# Patient Record
Sex: FEMALE | Race: WHITE | Employment: FULL TIME | URBAN - NONMETROPOLITAN AREA
[De-identification: names, ages, dates, MRNs, and addresses within clinical notes are randomized per-mention and may not be internally consistent; named-entity substitution may affect disease eponyms.]

---

## 2017-03-23 ENCOUNTER — OFFICE VISIT (OUTPATIENT)
Dept: NEUROLOGY | Age: 47
End: 2017-03-23
Payer: COMMERCIAL

## 2017-03-23 VITALS
BODY MASS INDEX: 33.12 KG/M2 | HEIGHT: 64 IN | SYSTOLIC BLOOD PRESSURE: 116 MMHG | DIASTOLIC BLOOD PRESSURE: 80 MMHG | WEIGHT: 194 LBS | RESPIRATION RATE: 16 BRPM

## 2017-03-23 DIAGNOSIS — R51.9 CHRONIC DAILY HEADACHE: ICD-10-CM

## 2017-03-23 DIAGNOSIS — R90.89 ABNORMAL BRAIN MRI: Primary | ICD-10-CM

## 2017-03-23 DIAGNOSIS — G25.0 ESSENTIAL TREMOR: ICD-10-CM

## 2017-03-23 DIAGNOSIS — R79.89 ELEVATED PROLACTIN LEVEL: ICD-10-CM

## 2017-03-23 PROCEDURE — 99204 OFFICE O/P NEW MOD 45 MIN: CPT | Performed by: PHYSICIAN ASSISTANT

## 2017-03-23 RX ORDER — DULOXETIN HYDROCHLORIDE 60 MG/1
60 CAPSULE, DELAYED RELEASE ORAL DAILY
COMMUNITY
Start: 2017-01-30 | End: 2017-07-13

## 2017-03-23 RX ORDER — LEVOTHYROXINE SODIUM 125 MCG
100 TABLET ORAL DAILY
COMMUNITY
Start: 2017-01-27

## 2017-03-24 ENCOUNTER — TELEPHONE (OUTPATIENT)
Dept: NEUROLOGY | Age: 47
End: 2017-03-24

## 2017-03-24 DIAGNOSIS — R90.89 ABNORMAL BRAIN MRI: Primary | ICD-10-CM

## 2017-03-24 DIAGNOSIS — Z85.71 HX OF HODGKIN'S LYMPHOMA: ICD-10-CM

## 2017-03-27 ENCOUNTER — TELEPHONE (OUTPATIENT)
Dept: NEUROLOGY | Age: 47
End: 2017-03-27

## 2017-03-28 ENCOUNTER — TELEPHONE (OUTPATIENT)
Dept: NEUROLOGY | Age: 47
End: 2017-03-28

## 2017-03-29 ENCOUNTER — TELEPHONE (OUTPATIENT)
Dept: NEUROSURGERY | Age: 47
End: 2017-03-29

## 2017-04-13 ENCOUNTER — OFFICE VISIT (OUTPATIENT)
Dept: OTOLARYNGOLOGY | Facility: CLINIC | Age: 47
End: 2017-04-13

## 2017-04-13 ENCOUNTER — PROCEDURE VISIT (OUTPATIENT)
Dept: OTOLARYNGOLOGY | Facility: CLINIC | Age: 47
End: 2017-04-13

## 2017-04-13 VITALS
BODY MASS INDEX: 33.14 KG/M2 | TEMPERATURE: 98.6 F | DIASTOLIC BLOOD PRESSURE: 88 MMHG | SYSTOLIC BLOOD PRESSURE: 140 MMHG | HEART RATE: 80 BPM | HEIGHT: 64 IN | WEIGHT: 194.13 LBS

## 2017-04-13 DIAGNOSIS — H72.91 TYMPANIC MEMBRANE PERFORATION, RIGHT: Primary | ICD-10-CM

## 2017-04-13 DIAGNOSIS — H72.91 TYMPANIC MEMBRANE PERFORATION, RIGHT: ICD-10-CM

## 2017-04-13 DIAGNOSIS — K21.9 LARYNGOPHARYNGEAL REFLUX (LPR): ICD-10-CM

## 2017-04-13 DIAGNOSIS — K11.8 PAROTID NODULE: ICD-10-CM

## 2017-04-13 DIAGNOSIS — H90.2 CONDUCTIVE HEARING LOSS: ICD-10-CM

## 2017-04-13 DIAGNOSIS — Z85.72 HISTORY OF NON-HODGKIN'S LYMPHOMA: ICD-10-CM

## 2017-04-13 DIAGNOSIS — E04.1 THYROID NODULE: ICD-10-CM

## 2017-04-13 DIAGNOSIS — H90.71 MIXED HEARING LOSS OF RIGHT EAR: ICD-10-CM

## 2017-04-13 DIAGNOSIS — M54.2 NECK PAIN: ICD-10-CM

## 2017-04-13 DIAGNOSIS — S03.00XA TMJ (DISLOCATION OF TEMPOROMANDIBULAR JOINT), INITIAL ENCOUNTER: Primary | ICD-10-CM

## 2017-04-13 PROCEDURE — 31575 DIAGNOSTIC LARYNGOSCOPY: CPT | Performed by: NURSE PRACTITIONER

## 2017-04-13 PROCEDURE — 92567 TYMPANOMETRY: CPT | Performed by: AUDIOLOGIST-HEARING AID FITTER

## 2017-04-13 PROCEDURE — 92553 AUDIOMETRY AIR & BONE: CPT | Performed by: AUDIOLOGIST-HEARING AID FITTER

## 2017-04-13 PROCEDURE — 99204 OFFICE O/P NEW MOD 45 MIN: CPT | Performed by: NURSE PRACTITIONER

## 2017-04-13 RX ORDER — LEVOTHYROXINE SODIUM 100 MCG
TABLET ORAL
COMMUNITY
Start: 2017-01-27

## 2017-04-13 RX ORDER — DULOXETIN HYDROCHLORIDE 60 MG/1
CAPSULE, DELAYED RELEASE ORAL
COMMUNITY
Start: 2017-04-03

## 2017-04-13 NOTE — PROGRESS NOTES
YOB: 1970  Location: [facility]  Location Address: [address] [place] [address], [facility], [address] [place] [address]  Location Phone: [phone]    Chief Complaint   Patient presents with   • Thyroid Problem       History of Present Illness  Eleanor Fulton is a 46 y.o. female.  Eleanor Fulton is here for evaluation of ENT complaints. The patient has had problems with throat pain, left jaw pain, thyroid nodules  The symptoms are not localized to a particular location. The patient has had moderate symptoms. The symptoms have been present for the last several months . The symptoms are aggravated by  no identifiable factors . The symptoms are improved by no identifieable factors. She has a history of nonHodgkins lymphoma 22 years ago with XRT tx - responded well with negative followup PET CT.  She follows with Dr. Arguello.  She has had some variable neck pain that she points to cervical region and describes as muscle pain.  However, also reports left ?TMJ pain and has popping and clicking of left TMJ joint.  She has been evaluated by dentist.  She has had multiple imaging studies which are going to be scanned into the computer and has multiple places.  She is going to be evaluated by a neurosurgeon for a possible pituitary adenoma.  She has an MRI brain with mention of small parotid LN - this was noted noted on a  CT NECK.  She had notation of bilateral subcentimeter thyroid nodules with atrophic thyroid.  C/o right left numbness which is intermittent  Reports hoarseness and throat pain.  Take Prevacid PRN    CT Soft Tissue Neck With Contrast (2017 11:08 AM)       MRI Brain With Without Contrast (2017 10:50 AM)  MRI Brain With Without Contrast (2017 10:50 AM)   Addenda   Addendum by Wenceslao Torres MD on 2017 10:27 AM    Corrected report for typographical error(s) dictated by Ward Keith RPA-BENJAMÍNA for EMMIE. Markus Torres MD        COMPARISON:    MRI pituitary without and with contrast  2/17/2016            MRI Brain With Without Contrast (02/17/2017 10:50 AM)   Narrative       PROCEDURE    MRI brain and pituitary without and with contrast, 2/17/2017, 9:15 AM        LOCATION: Outpatient, 3.0        CLINICAL INFORMATION    Personal history of Hodgkin's lymphoma. Headache, dizziness,    giddiness.        COMPARISON    None.        TECHNIQUE    MRI brain and pituitary performed including high-resolution 3 mm    images through the pituitary fossa and sella as well as routine 5 mm    brain images. Performed at 1[facility]. T1, fat suppressed T1, T2,    gradient-echo, FLAIR and diffusion-weighted imaging sequences. For    contrast patient was given 9 cc Gadavist.        FINDINGS    Brain appears symmetric. No mass or edema. No evidence of infarct. No    restricted diffusion appreciated.        Ventricles and extra-axial fluid spaces are normal. Basal cisterns are    unremarkable.        The area of lesser enhancement in the left side of the pituitary is    again noted. This measures between 5 and 6 bony or wide and between 3    and 4 mm superior to inferior. On the precontrast images this appears    to correlate with the neurohypophysis rather than the anterior    pituitary.        There is no downward displacement of the cerebellar tonsils.        Orbits appear unremarkable. No significant sinus abnormality. There    are bilateral small enhancing nodules in the parotid glands. 2 are    noted on the right measuring about 8 to 9 mm there is one on the left    measuring about 9 mm. There are also some mildly prominent    submandibular lymph nodes. This is not as well demonstrated as on    previous examination but are probably unchanged.        Small amount of T2 hyperintense signal in the inferior tip of the    mastoid consistent with some retained fluid. Similar appearance on    previous study.        IMPRESSION    1. Mild asymmetric enhancement of the pituitary unchanged from prior    study. Although this  could represent a small pituitary adenoma it    appears to correlate better with the neurohypophysis which is a little    more prominent on the left than right.    2. No other significant intracranial abnormality. No abnormal    enhancement.    3. There are some bilateral stable intraparotid nodules probably    representing lymph nodes. Another possibility is small stable    Warthin's tumors.    4. There are also some mildly prominent submandibular lymph nodes.    These are incompletely demonstrated but probably stable as well. See    recent soft tissue CT examination the neck.         CT Soft Tissue Neck With Contrast (02/13/2017 11:08 AM)  CT Soft Tissue Neck With Contrast (02/13/2017 11:08 AM)   Impressions   IMPRESSION:    1. No acute findings. No cervical adenopathy by size criteria.    Subcentimeter lymph nodes are unchanged.    2. Unchanged parenchymal scarring in the lung apices.           CT Soft Tissue Neck With Contrast (02/13/2017 11:08 AM)   Narrative   EXAMINATION: CT soft tissue neck with contrast February 13, 2017 at    11:04 AM        LOCATION: Outpatient 3.0        HISTORY: Dizziness and giddiness, Enlarged lymph nodes, unspecified,    Personal history of Hodgkin lymphoma.            COMPARISON: August 10, 2014        Contrast: 100 mL Omnipaque 350 intravenous contrast was administered.        CT exam performed using one or more of the following dose reduction    techniques: Automated exposure control, adjustment of the mA and/or KV    according to patient size, and/or use of iterative reconstruction    techniques.        FINDINGS:    The mucosal spaces of the nasopharynx, oropharynx, and hypopharynx are    normal in appearance. Parapharyngeal spaces clear. There is no    cervical adenopathy by size criteria. Subcentimeter submandibular and    cervical chain lymph nodes are noted bilaterally and are unchanged    from 2014.        There are small intraparotid lymph nodes bilaterally. Parotid, 7 the     liver, and thyroid glands are otherwise normal in appearance.        No cervical mass or fluid collection identified. Visualized sinuses    are clear.        No acute osseous abnormality identified. There is unchanged    parenchymal scarring in both lung apices. Sclerotic lesion in the C7    vertebral body is unchanged.            Past Medical History:   Diagnosis Date   • Depression    • Diabetes    • Fibromyalgia    • History of radiation therapy    • Hodgkin's disease    • Hypothyroidism    • Odynophagia    • Pituitary adenoma    • Tachycardia        Past Surgical History:   Procedure Laterality Date   • ANKLE SURGERY     • APPENDECTOMY     • CARDIAC ABLATION     • CHOLECYSTECTOMY     • COLONOSCOPY     • LYMPH NODE BIOPSY     • MYRINGOTOMY W/ TUBES     • OOPHORECTOMY     • SPLENECTOMY     • TONSILLECTOMY           Current Outpatient Prescriptions:   •  DULoxetine (CYMBALTA) 60 MG capsule, , Disp: , Rfl:   •  SYNTHROID 100 MCG tablet, , Disp: , Rfl:     Compazine [prochlorperazine edisylate]    Family History   Problem Relation Age of Onset   • Cancer Mother    • Diabetes Mother    • Heart failure Mother    • Heart failure Father    • Cancer Father    • Diabetes Father        Social History     Social History   • Marital status:      Spouse name: N/A   • Number of children: N/A   • Years of education: N/A     Occupational History   • Not on file.     Social History Main Topics   • Smoking status: Never Smoker   • Smokeless tobacco: Not on file   • Alcohol use Yes      Comment: occasionally   • Drug use: Defer   • Sexual activity: Not on file     Other Topics Concern   • Not on file     Social History Narrative       Review of Systems   Constitutional: Negative.    HENT:        SEE HPI   Eyes: Negative.    Respiratory: Negative.    Cardiovascular: Negative.    Gastrointestinal: Negative.    Endocrine: Negative.    Genitourinary: Negative.    Musculoskeletal: Negative.    Skin: Negative.     Allergic/Immunologic: Negative.    Neurological: Negative.    Hematological: Negative.    Psychiatric/Behavioral: Negative.        Vitals:    04/13/17 1454   BP: 140/88   Pulse: 80   Temp: 98.6 °F (37 °C)       Objective     Physical Exam  CONSTITUTIONAL: well nourished, alert, oriented, in no acute distress     COMMUNICATION AND VOICE: able to communicate normally, normal voice quality    HEAD: normocephalic, no lesions, atraumatic, no tenderness, no masses     FACE: appearance normal, no lesions, no tenderness, no deformities, facial motion symmetric  Popping and clicking at left TMJ upon opening and exam    SALIVARY GLANDS: parotid glands with no tenderness, no swelling, no masses, submandibular glands with normal size, nontender    EYES: ocular motility normal, eyelids normal, orbits normal, no proptosis, conjunctiva normal , pupils equal, round     EARS:  Hearing: response to conversational voice normal bilaterally   External Ears: auricles without lesions  Otoscopic: 40% marginal TM perforation, right EAC normal, left TM scarring, left EAC normal    NOSE:  External Nose: structure normal, no tenderness on palpation, no nasal discharge, no lesions, no evidence of trauma, nostrils patent   Intranasal Exam: nasal mucosa normal, vestibule within normal limits, inferior turbinate normal, nasal septum midline     ORAL:  Lips: upper and lower lips without lesion   Teeth: dentition within normal limits for age   Gums: gingivae healthy   Oral Mucosa: oral mucosa normal, no mucosal lesions   Floor of Mouth: Warthin’s duct patent, mucosa normal  Tongue: lingual mucosa normal without lesions, normal tongue mobility   Palate: soft and hard palates with normal mucosa and structure  Oropharynx: oropharyngeal mucosa normal    LARYNGEAL: NORMAL - see scope    NECK: neck appearance normal, no mass,  noted without erythema or tenderness, right neck incision well healed - no palpable lymphadenopathy    THYROID: thyroid nodules  not palpable    LYMPH NODES: no lymphadenopathy    CHEST/RESPIRATORY: respiratory effort normal,    CARDIOVASCULAR: extremities without cyanosis or edema      NEUROLOGIC/PSYCHIATRIC: oriented to time, place and person, mood normal, affect appropriate, CN II-XII intact grossly    Assessment/Plan   Eleanor was seen today for thyroid problem.    Diagnoses and all orders for this visit:    TMJ (dislocation of temporomandibular joint), initial encounter    Thyroid nodule  -     US Thyroid; Future    Laryngopharyngeal reflux (LPR)    Neck pain    History of non-Hodgkin's lymphoma    Parotid nodule    Conductive hearing loss - right    Tympanic membrane perforation, right      * Surgery not found *  Orders Placed This Encounter   Procedures   • US Thyroid     Standing Status:   Future     Standing Expiration Date:   4/13/2019     Order Specific Question:   Reason for Exam:     Answer:   thyroid nodules     Return in about 6 months (around 10/13/2017) for dr funk.       Patient Instructions   The patient has a thyroid nodule, which is relatively small, and studies do not suggest a malignancy. I have recommended observation with follow-up with me for repeat ultrasound. I explained the pathology of thyroid nodules including the risks of cancer. The options of surgery were discussed, but we will take an observational course for now.     Parotids appears to have stable nonpathologic LN size - I will obtain these actual films however CT neck reports (small) presumably nonpathologic and these are not palpable - (US scanning at bedside reveals 7 mm on right 9 mm left).      Laryngeal exam normal  Increase prevacid to daily  TMj precautions    If Dr. Arguello suspects pathology otherwise to parotids, consider PET CT - as these LN are small - will obtain imaging CD.      Call for problems or worsening symptoms

## 2017-04-13 NOTE — PATIENT INSTRUCTIONS
The patient has a thyroid nodule, which is relatively small, and studies do not suggest a malignancy. I have recommended observation with follow-up with me for repeat ultrasound. I explained the pathology of thyroid nodules including the risks of cancer. The options of surgery were discussed, but we will take an observational course for now.     Parotids appears to have stable nonpathologic LN size - I will obtain these actual films however CT neck reports (small) presumably nonpathologic and these are not palpable - (US scanning at bedside reveals 7 mm on right 9 mm left).      Laryngeal exam normal  Increase prevacid to daily  TMj precautions    If Dr. Arguello suspects pathology otherwise to parotids, consider PET CT - as these LN are small - will obtain imaging CD.      Call for problems or worsening symptoms

## 2017-04-13 NOTE — PROGRESS NOTES
OPERATIVE NOTE:    PROCEDURE:   Flexible Fiberoptic Laryngoscopy    ANESTHESIA:  None    REASON FOR PROCEDURE:  Procedure was recommend for suspicious clinical behavior unresponsive to medical management, throat pain, history of lymphoma.  Risks, benefits and alternatives were discussed.      DETAILS of OPERATION:  The patient was seated in the exam chair.  A flexible fiberoptic laryngoscopy was performed through the oral cavity.  The scope was introduced into the oral cavity and directed to the level of the glottis, examining the structures of the oropharynx, base of tongue, vallecula, supraglottic larynx, glottic larynx, and hypopharynx.      FINDINGS:  Mucosal surfaces:   The mucosal surfaces demonstrated normal mucosa surfaces without inflammation.    Base of tongue:  The base of tongue was found to have no mass or lesion.    Epiglottis:  The epiglottis was found to have no mass or lesion.    Aryepligottic fold:  The AE folds were found to have no mass or lesion.    False Vocal Fold:  The false cords were found to have no mass or lesion.    True Vocal Cord:  The true vocal cords were found to have no mass or lesion.    Arytenoid:   The arytenoids were found to have no mass or lesions.    Hypopharynx:  The hypopharynx was found to have no mass or lesion.    The patient tolerated procedure well.

## 2017-04-25 ENCOUNTER — OFFICE VISIT (OUTPATIENT)
Dept: NEUROSURGERY | Age: 47
End: 2017-04-25
Payer: COMMERCIAL

## 2017-04-25 VITALS
HEART RATE: 105 BPM | OXYGEN SATURATION: 99 % | WEIGHT: 193 LBS | DIASTOLIC BLOOD PRESSURE: 87 MMHG | SYSTOLIC BLOOD PRESSURE: 136 MMHG | BODY MASS INDEX: 32.15 KG/M2 | HEIGHT: 65 IN

## 2017-04-25 DIAGNOSIS — R51.9 NONINTRACTABLE EPISODIC HEADACHE, UNSPECIFIED HEADACHE TYPE: ICD-10-CM

## 2017-04-25 DIAGNOSIS — E23.7 PITUITARY ABNORMALITY (HCC): ICD-10-CM

## 2017-04-25 DIAGNOSIS — E23.7 PITUITARY ABNORMALITY (HCC): Primary | ICD-10-CM

## 2017-04-25 DIAGNOSIS — R53.83 FATIGUE, UNSPECIFIED TYPE: ICD-10-CM

## 2017-04-25 LAB
ALBUMIN SERPL-MCNC: 3.9 G/DL (ref 3.5–5.2)
ALP BLD-CCNC: 103 U/L (ref 35–104)
ALT SERPL-CCNC: 32 U/L (ref 5–33)
ANION GAP SERPL CALCULATED.3IONS-SCNC: 15 MMOL/L (ref 7–19)
AST SERPL-CCNC: 31 U/L (ref 5–32)
BASOPHILS ABSOLUTE: 0.1 K/UL (ref 0–0.2)
BASOPHILS RELATIVE PERCENT: 0.6 % (ref 0–1)
BILIRUB SERPL-MCNC: 0.3 MG/DL (ref 0.2–1.2)
BUN BLDV-MCNC: 7 MG/DL (ref 6–20)
CALCIUM SERPL-MCNC: 9.1 MG/DL (ref 8.6–10)
CHLORIDE BLD-SCNC: 102 MMOL/L (ref 98–111)
CO2: 23 MMOL/L (ref 22–29)
CREAT SERPL-MCNC: 0.9 MG/DL (ref 0.5–0.9)
EOSINOPHILS ABSOLUTE: 0.7 K/UL (ref 0–0.6)
EOSINOPHILS RELATIVE PERCENT: 5.2 % (ref 0–5)
FOLLICLE STIMULATING HORMONE: 11.1 MIU/ML
GFR NON-AFRICAN AMERICAN: >60
GLOBULIN: 5.2 G/DL
GLUCOSE BLD-MCNC: 90 MG/DL (ref 74–109)
HCT VFR BLD CALC: 40.5 % (ref 37–47)
HEMOGLOBIN: 13.1 G/DL (ref 12–16)
LUTEINIZING HORMONE: 8.5 MIU/ML
LYMPHOCYTES ABSOLUTE: 4 K/UL (ref 1.1–4.5)
LYMPHOCYTES RELATIVE PERCENT: 29.2 % (ref 20–40)
MCH RBC QN AUTO: 29.8 PG (ref 27–31)
MCHC RBC AUTO-ENTMCNC: 32.3 G/DL (ref 33–37)
MCV RBC AUTO: 92.3 FL (ref 81–99)
MONOCYTES ABSOLUTE: 1.8 K/UL (ref 0–0.9)
MONOCYTES RELATIVE PERCENT: 13.1 % (ref 0–10)
NEUTROPHILS ABSOLUTE: 7.1 K/UL (ref 1.5–7.5)
NEUTROPHILS RELATIVE PERCENT: 51.5 % (ref 50–65)
PDW BLD-RTO: 14.9 % (ref 11.5–14.5)
PLATELET # BLD: 559 K/UL (ref 130–400)
PMV BLD AUTO: 10.6 FL (ref 7.4–10.4)
POTASSIUM SERPL-SCNC: 3.8 MMOL/L (ref 3.5–5)
RBC # BLD: 4.39 M/UL (ref 4.2–5.4)
SODIUM BLD-SCNC: 140 MMOL/L (ref 136–145)
TOTAL PROTEIN: 9.1 G/DL (ref 6.6–8.7)
TSH SERPL DL<=0.05 MIU/L-ACNC: 4.33 UIU/ML (ref 0.27–4.2)
WBC # BLD: 13.8 K/UL (ref 4.8–10.8)

## 2017-04-25 PROCEDURE — 99203 OFFICE O/P NEW LOW 30 MIN: CPT | Performed by: NEUROLOGICAL SURGERY

## 2017-04-25 RX ORDER — ASPIRIN 325 MG
325 TABLET ORAL DAILY
COMMUNITY
End: 2017-07-13

## 2017-04-25 ASSESSMENT — ENCOUNTER SYMPTOMS
SORE THROAT: 0
HEARTBURN: 0
STRIDOR: 0
EYES NEGATIVE: 1
VOMITING: 0
CONSTIPATION: 1
ABDOMINAL PAIN: 0
RESPIRATORY NEGATIVE: 1
NAUSEA: 0
DIARRHEA: 0
BLOOD IN STOOL: 0
BACK PAIN: 0

## 2017-04-27 LAB
ADRENOCORTICOTROPIC HORMONE: 21 PG/ML (ref 6–58)
GROWTH HORMONE: 0.51 NG/ML (ref 0.05–8)
IGF-1 (INSULIN-LIKE GROWTH I): 113 NG/ML (ref 118–298)
PROLACTIN: 21.7 NG/ML (ref 2.8–26)

## 2017-05-02 ENCOUNTER — TELEPHONE (OUTPATIENT)
Dept: NEUROLOGY | Age: 47
End: 2017-05-02

## 2017-05-05 ENCOUNTER — HOSPITAL ENCOUNTER (OUTPATIENT)
Dept: GENERAL RADIOLOGY | Age: 47
Discharge: HOME | End: 2017-05-05
Payer: COMMERCIAL

## 2017-05-05 DIAGNOSIS — R00.0 TACHYCARDIA, UNSPECIFIED: ICD-10-CM

## 2017-05-05 PROCEDURE — 71020 XR CHEST STANDARD TWO VW: CPT

## 2017-05-24 ENCOUNTER — TELEPHONE (OUTPATIENT)
Dept: NEUROSURGERY | Age: 47
End: 2017-05-24

## 2017-05-26 ENCOUNTER — HOSPITAL ENCOUNTER (OUTPATIENT)
Dept: CT IMAGING | Age: 47
Discharge: HOME | End: 2017-05-26
Payer: COMMERCIAL

## 2017-05-26 DIAGNOSIS — R53.83 OTHER FATIGUE: ICD-10-CM

## 2017-05-26 DIAGNOSIS — C82.11 FOLLICULAR LYMPHOMA GRADE II OF LYMPH NODES OF HEAD, FACE, AND NECK (HCC): ICD-10-CM

## 2017-05-26 PROCEDURE — 74177 CT ABD & PELVIS W/CONTRAST: CPT

## 2017-05-26 PROCEDURE — 6360000004 HC RX CONTRAST MEDICATION: Performed by: INTERNAL MEDICINE

## 2017-05-26 PROCEDURE — 71260 CT THORAX DX C+: CPT

## 2017-05-26 RX ADMIN — IOVERSOL 75 ML: 741 INJECTION INTRA-ARTERIAL; INTRAVENOUS at 12:00

## 2017-05-31 ENCOUNTER — TELEPHONE (OUTPATIENT)
Dept: NEUROLOGY | Age: 47
End: 2017-05-31

## 2017-06-15 ENCOUNTER — HOSPITAL ENCOUNTER (OUTPATIENT)
Dept: GENERAL RADIOLOGY | Age: 47
Discharge: HOME | End: 2017-06-15
Payer: COMMERCIAL

## 2017-06-15 DIAGNOSIS — M25.549 ARTHRALGIA OF HAND, UNSPECIFIED LATERALITY: ICD-10-CM

## 2017-06-15 PROCEDURE — 73130 X-RAY EXAM OF HAND: CPT

## 2017-06-19 ENCOUNTER — TELEPHONE (OUTPATIENT)
Dept: NEUROLOGY | Age: 47
End: 2017-06-19

## 2017-07-10 ENCOUNTER — TELEPHONE (OUTPATIENT)
Dept: NEUROLOGY | Age: 47
End: 2017-07-10

## 2017-07-10 ENCOUNTER — APPOINTMENT (OUTPATIENT)
Dept: CT IMAGING | Age: 47
End: 2017-07-10
Payer: COMMERCIAL

## 2017-07-10 ENCOUNTER — HOSPITAL ENCOUNTER (EMERGENCY)
Age: 47
Discharge: HOME | End: 2017-07-10
Payer: COMMERCIAL

## 2017-07-10 VITALS
WEIGHT: 195 LBS | DIASTOLIC BLOOD PRESSURE: 80 MMHG | RESPIRATION RATE: 24 BRPM | SYSTOLIC BLOOD PRESSURE: 135 MMHG | HEART RATE: 105 BPM | BODY MASS INDEX: 33.29 KG/M2 | TEMPERATURE: 98.7 F | OXYGEN SATURATION: 100 % | HEIGHT: 64 IN

## 2017-07-10 DIAGNOSIS — H81.10 BENIGN PAROXYSMAL POSITIONAL VERTIGO, UNSPECIFIED LATERALITY: Primary | ICD-10-CM

## 2017-07-10 DIAGNOSIS — R51.9 NONINTRACTABLE HEADACHE, UNSPECIFIED CHRONICITY PATTERN, UNSPECIFIED HEADACHE TYPE: ICD-10-CM

## 2017-07-10 LAB
ALBUMIN SERPL-MCNC: 3.8 G/DL (ref 3.5–5.2)
ALP BLD-CCNC: 94 U/L (ref 35–104)
ALT SERPL-CCNC: 32 U/L (ref 5–33)
ANION GAP SERPL CALCULATED.3IONS-SCNC: 14 MMOL/L (ref 7–19)
AST SERPL-CCNC: 32 U/L (ref 5–32)
BASOPHILS ABSOLUTE: 0.1 K/UL (ref 0–0.2)
BASOPHILS RELATIVE PERCENT: 0.6 % (ref 0–1)
BILIRUB SERPL-MCNC: 0.5 MG/DL (ref 0.2–1.2)
BILIRUBIN URINE: NEGATIVE
BLOOD, URINE: NEGATIVE
BUN BLDV-MCNC: 11 MG/DL (ref 6–20)
CALCIUM SERPL-MCNC: 8.8 MG/DL (ref 8.6–10)
CHLORIDE BLD-SCNC: 101 MMOL/L (ref 98–111)
CLARITY: ABNORMAL
CO2: 22 MMOL/L (ref 22–29)
COLOR: YELLOW
CREAT SERPL-MCNC: 0.9 MG/DL (ref 0.5–0.9)
EOSINOPHILS ABSOLUTE: 0.6 K/UL (ref 0–0.6)
EOSINOPHILS RELATIVE PERCENT: 4.3 % (ref 0–5)
GFR NON-AFRICAN AMERICAN: >60
GLUCOSE BLD-MCNC: 103 MG/DL (ref 74–109)
GLUCOSE URINE: NEGATIVE MG/DL
HCG(URINE) PREGNANCY TEST: NEGATIVE
HCT VFR BLD CALC: 38 % (ref 37–47)
HEMOGLOBIN: 12.6 G/DL (ref 12–16)
KETONES, URINE: NEGATIVE MG/DL
LEUKOCYTE ESTERASE, URINE: NEGATIVE
LYMPHOCYTES ABSOLUTE: 4.2 K/UL (ref 1.1–4.5)
LYMPHOCYTES RELATIVE PERCENT: 29.5 % (ref 20–40)
MCH RBC QN AUTO: 30.3 PG (ref 27–31)
MCHC RBC AUTO-ENTMCNC: 33.2 G/DL (ref 33–37)
MCV RBC AUTO: 91.3 FL (ref 81–99)
MONOCYTES ABSOLUTE: 2 K/UL (ref 0–0.9)
MONOCYTES RELATIVE PERCENT: 14.2 % (ref 0–10)
NEUTROPHILS ABSOLUTE: 7.3 K/UL (ref 1.5–7.5)
NEUTROPHILS RELATIVE PERCENT: 50.9 % (ref 50–65)
NITRITE, URINE: NEGATIVE
PDW BLD-RTO: 15 % (ref 11.5–14.5)
PH UA: 5.5
PLATELET # BLD: 493 K/UL (ref 130–400)
PMV BLD AUTO: 10.5 FL (ref 9.4–12.3)
POTASSIUM SERPL-SCNC: 3.9 MMOL/L (ref 3.5–5)
PROTEIN UA: NEGATIVE MG/DL
RBC # BLD: 4.16 M/UL (ref 4.2–5.4)
SODIUM BLD-SCNC: 137 MMOL/L (ref 136–145)
SPECIFIC GRAVITY UA: 1.01
TOTAL PROTEIN: 9.4 G/DL (ref 6.6–8.7)
UROBILINOGEN, URINE: 0.2 E.U./DL
WBC # BLD: 14.3 K/UL (ref 4.8–10.8)

## 2017-07-10 PROCEDURE — 6370000000 HC RX 637 (ALT 250 FOR IP): Performed by: PHYSICIAN ASSISTANT

## 2017-07-10 PROCEDURE — 96374 THER/PROPH/DIAG INJ IV PUSH: CPT

## 2017-07-10 PROCEDURE — 81025 URINE PREGNANCY TEST: CPT

## 2017-07-10 PROCEDURE — 80053 COMPREHEN METABOLIC PANEL: CPT

## 2017-07-10 PROCEDURE — 6360000002 HC RX W HCPCS: Performed by: PHYSICIAN ASSISTANT

## 2017-07-10 PROCEDURE — 2580000003 HC RX 258: Performed by: PHYSICIAN ASSISTANT

## 2017-07-10 PROCEDURE — 85025 COMPLETE CBC W/AUTO DIFF WBC: CPT

## 2017-07-10 PROCEDURE — 36415 COLL VENOUS BLD VENIPUNCTURE: CPT

## 2017-07-10 PROCEDURE — 99284 EMERGENCY DEPT VISIT MOD MDM: CPT

## 2017-07-10 PROCEDURE — 81003 URINALYSIS AUTO W/O SCOPE: CPT

## 2017-07-10 PROCEDURE — 99283 EMERGENCY DEPT VISIT LOW MDM: CPT | Performed by: PHYSICIAN ASSISTANT

## 2017-07-10 PROCEDURE — 70450 CT HEAD/BRAIN W/O DYE: CPT

## 2017-07-10 PROCEDURE — 93005 ELECTROCARDIOGRAM TRACING: CPT

## 2017-07-10 RX ORDER — 0.9 % SODIUM CHLORIDE 0.9 %
1000 INTRAVENOUS SOLUTION INTRAVENOUS ONCE
Status: COMPLETED | OUTPATIENT
Start: 2017-07-10 | End: 2017-07-10

## 2017-07-10 RX ORDER — MECLIZINE HCL 12.5 MG/1
12.5 TABLET ORAL 3 TIMES DAILY PRN
Qty: 20 TABLET | Refills: 0 | Status: SHIPPED | OUTPATIENT
Start: 2017-07-10 | End: 2017-07-20

## 2017-07-10 RX ORDER — ONDANSETRON 4 MG/1
4 TABLET, ORALLY DISINTEGRATING ORAL EVERY 8 HOURS PRN
Qty: 20 TABLET | Refills: 0 | Status: SHIPPED | OUTPATIENT
Start: 2017-07-10 | End: 2017-09-08 | Stop reason: ALTCHOICE

## 2017-07-10 RX ORDER — ONDANSETRON 2 MG/ML
4 INJECTION INTRAMUSCULAR; INTRAVENOUS ONCE
Status: COMPLETED | OUTPATIENT
Start: 2017-07-10 | End: 2017-07-10

## 2017-07-10 RX ORDER — MECLIZINE HCL 12.5 MG/1
25 TABLET ORAL ONCE
Status: COMPLETED | OUTPATIENT
Start: 2017-07-10 | End: 2017-07-10

## 2017-07-10 RX ADMIN — ONDANSETRON 4 MG: 2 INJECTION INTRAMUSCULAR; INTRAVENOUS at 16:11

## 2017-07-10 RX ADMIN — SODIUM CHLORIDE 1000 ML: 9 INJECTION, SOLUTION INTRAVENOUS at 15:56

## 2017-07-10 RX ADMIN — MECLIZINE 25 MG: 12.5 TABLET ORAL at 16:11

## 2017-07-10 ASSESSMENT — ENCOUNTER SYMPTOMS
APNEA: 0
VOMITING: 0
NAUSEA: 0
SORE THROAT: 0
WHEEZING: 0
EYE PAIN: 0
RHINORRHEA: 0
ABDOMINAL DISTENTION: 0
COUGH: 0
CONSTIPATION: 0
CHEST TIGHTNESS: 0
SINUS PRESSURE: 0
DIARRHEA: 0
ABDOMINAL PAIN: 0
SHORTNESS OF BREATH: 0

## 2017-07-10 ASSESSMENT — PAIN DESCRIPTION - PAIN TYPE: TYPE: ACUTE PAIN

## 2017-07-10 ASSESSMENT — PAIN DESCRIPTION - LOCATION: LOCATION: HEAD

## 2017-07-10 ASSESSMENT — PAIN DESCRIPTION - DESCRIPTORS: DESCRIPTORS: SHARP

## 2017-07-10 ASSESSMENT — PAIN SCALES - GENERAL: PAINLEVEL_OUTOF10: 4

## 2017-07-12 ENCOUNTER — TELEPHONE (OUTPATIENT)
Dept: NEUROSURGERY | Age: 47
End: 2017-07-12

## 2017-07-13 ENCOUNTER — OFFICE VISIT (OUTPATIENT)
Dept: NEUROSURGERY | Age: 47
End: 2017-07-13
Payer: COMMERCIAL

## 2017-07-13 VITALS
SYSTOLIC BLOOD PRESSURE: 136 MMHG | BODY MASS INDEX: 33.29 KG/M2 | HEART RATE: 113 BPM | HEIGHT: 64 IN | WEIGHT: 195 LBS | DIASTOLIC BLOOD PRESSURE: 90 MMHG | OXYGEN SATURATION: 100 %

## 2017-07-13 DIAGNOSIS — R53.83 FATIGUE, UNSPECIFIED TYPE: ICD-10-CM

## 2017-07-13 DIAGNOSIS — Z85.71 HX OF HODGKIN'S LYMPHOMA: ICD-10-CM

## 2017-07-13 DIAGNOSIS — R51.9 NONINTRACTABLE EPISODIC HEADACHE, UNSPECIFIED HEADACHE TYPE: ICD-10-CM

## 2017-07-13 DIAGNOSIS — E23.7 PITUITARY ABNORMALITY (HCC): Primary | ICD-10-CM

## 2017-07-13 DIAGNOSIS — R47.89 WORD FINDING DIFFICULTY: ICD-10-CM

## 2017-07-13 PROCEDURE — 99213 OFFICE O/P EST LOW 20 MIN: CPT | Performed by: NEUROLOGICAL SURGERY

## 2017-07-13 RX ORDER — MELOXICAM 7.5 MG/1
7.5 TABLET ORAL
COMMUNITY
Start: 2017-06-15 | End: 2017-09-08 | Stop reason: ALTCHOICE

## 2017-07-13 RX ORDER — DULOXETIN HYDROCHLORIDE 30 MG/1
30 CAPSULE, DELAYED RELEASE ORAL
COMMUNITY
Start: 2017-06-15 | End: 2017-07-13

## 2017-07-14 ENCOUNTER — OFFICE VISIT (OUTPATIENT)
Dept: NEUROLOGY | Age: 47
End: 2017-07-14
Payer: COMMERCIAL

## 2017-07-14 VITALS
HEART RATE: 121 BPM | WEIGHT: 194 LBS | DIASTOLIC BLOOD PRESSURE: 91 MMHG | SYSTOLIC BLOOD PRESSURE: 149 MMHG | BODY MASS INDEX: 33.12 KG/M2 | OXYGEN SATURATION: 99 % | HEIGHT: 64 IN

## 2017-07-14 DIAGNOSIS — M79.641 HAND PAIN, RIGHT: ICD-10-CM

## 2017-07-14 DIAGNOSIS — R29.898 WEAKNESS OF HAND: ICD-10-CM

## 2017-07-14 DIAGNOSIS — R51.9 CHRONIC DAILY HEADACHE: Primary | ICD-10-CM

## 2017-07-14 DIAGNOSIS — R20.0 NUMBNESS AND TINGLING: ICD-10-CM

## 2017-07-14 DIAGNOSIS — R20.2 NUMBNESS AND TINGLING: ICD-10-CM

## 2017-07-14 PROCEDURE — 99214 OFFICE O/P EST MOD 30 MIN: CPT | Performed by: PHYSICIAN ASSISTANT

## 2017-07-14 RX ORDER — TOPIRAMATE 25 MG/1
TABLET ORAL
Qty: 42 TABLET | Refills: 0 | Status: ON HOLD | OUTPATIENT
Start: 2017-07-14 | End: 2017-10-05

## 2017-07-14 RX ORDER — TOPIRAMATE 100 MG/1
TABLET, FILM COATED ORAL
Qty: 30 TABLET | Refills: 3 | Status: SHIPPED | OUTPATIENT
Start: 2017-07-14 | End: 2017-09-08 | Stop reason: ALTCHOICE

## 2017-07-17 LAB
EKG P AXIS: -11 DEGREES
EKG P-R INTERVAL: 142 MS
EKG Q-T INTERVAL: 340 MS
EKG QRS DURATION: 78 MS
EKG QTC CALCULATION (BAZETT): 413 MS
EKG T AXIS: 19 DEGREES

## 2017-07-28 ENCOUNTER — HOSPITAL ENCOUNTER (OUTPATIENT)
Dept: MRI IMAGING | Age: 47
Discharge: HOME | End: 2017-07-28
Payer: COMMERCIAL

## 2017-07-28 ENCOUNTER — HOSPITAL ENCOUNTER (OUTPATIENT)
Dept: NEUROLOGY | Age: 47
Discharge: HOME | End: 2017-07-28
Payer: COMMERCIAL

## 2017-07-28 DIAGNOSIS — R29.898 WEAKNESS OF HAND: ICD-10-CM

## 2017-07-28 DIAGNOSIS — R20.0 NUMBNESS AND TINGLING: ICD-10-CM

## 2017-07-28 DIAGNOSIS — R20.2 NUMBNESS AND TINGLING: ICD-10-CM

## 2017-07-28 DIAGNOSIS — M79.641 HAND PAIN, RIGHT: ICD-10-CM

## 2017-07-28 PROCEDURE — 72141 MRI NECK SPINE W/O DYE: CPT

## 2017-07-28 PROCEDURE — 95886 MUSC TEST DONE W/N TEST COMP: CPT | Performed by: PSYCHIATRY & NEUROLOGY

## 2017-07-28 PROCEDURE — 95910 NRV CNDJ TEST 7-8 STUDIES: CPT | Performed by: PSYCHIATRY & NEUROLOGY

## 2017-07-28 PROCEDURE — 95886 MUSC TEST DONE W/N TEST COMP: CPT

## 2017-07-28 PROCEDURE — 95910 NRV CNDJ TEST 7-8 STUDIES: CPT

## 2017-08-01 ENCOUNTER — TELEPHONE (OUTPATIENT)
Dept: NEUROLOGY | Age: 47
End: 2017-08-01

## 2017-08-02 ENCOUNTER — TELEPHONE (OUTPATIENT)
Dept: NEUROLOGY | Age: 47
End: 2017-08-02

## 2017-08-02 ENCOUNTER — TELEPHONE (OUTPATIENT)
Dept: NEUROSURGERY | Age: 47
End: 2017-08-02

## 2017-08-03 ENCOUNTER — TRANSCRIBE ORDERS (OUTPATIENT)
Dept: ADMINISTRATIVE | Facility: HOSPITAL | Age: 47
End: 2017-08-03

## 2017-08-03 ENCOUNTER — TELEPHONE (OUTPATIENT)
Dept: NEUROLOGY | Age: 47
End: 2017-08-03

## 2017-08-03 DIAGNOSIS — R70.0 ESR RAISED: Primary | ICD-10-CM

## 2017-08-10 ENCOUNTER — TELEPHONE (OUTPATIENT)
Dept: NEUROLOGY | Age: 47
End: 2017-08-10

## 2017-08-10 ENCOUNTER — HOSPITAL ENCOUNTER (OUTPATIENT)
Dept: CT IMAGING | Facility: HOSPITAL | Age: 47
Discharge: HOME | End: 2017-08-10
Attending: INTERNAL MEDICINE | Admitting: INTERNAL MEDICINE

## 2017-08-10 DIAGNOSIS — R70.0 ESR RAISED: ICD-10-CM

## 2017-08-10 LAB — CREAT BLDA-MCNC: 1 MG/DL (ref 0.6–1.3)

## 2017-08-10 PROCEDURE — 74177 CT ABD & PELVIS W/CONTRAST: CPT

## 2017-08-10 PROCEDURE — 71260 CT THORAX DX C+: CPT

## 2017-08-10 PROCEDURE — 82565 ASSAY OF CREATININE: CPT

## 2017-08-10 PROCEDURE — 0 IOPAMIDOL 61 % SOLUTION: Performed by: INTERNAL MEDICINE

## 2017-08-10 RX ADMIN — IOPAMIDOL 100 ML: 612 INJECTION, SOLUTION INTRAVENOUS at 10:45

## 2017-08-11 DIAGNOSIS — G56.01 CARPAL TUNNEL SYNDROME OF RIGHT WRIST: Primary | ICD-10-CM

## 2017-08-15 ENCOUNTER — TELEPHONE (OUTPATIENT)
Dept: NEUROLOGY | Age: 47
End: 2017-08-15

## 2017-08-17 ENCOUNTER — OFFICE VISIT (OUTPATIENT)
Dept: NEUROLOGY | Age: 47
End: 2017-08-17
Payer: COMMERCIAL

## 2017-08-17 VITALS
OXYGEN SATURATION: 100 % | HEART RATE: 102 BPM | HEIGHT: 65 IN | DIASTOLIC BLOOD PRESSURE: 85 MMHG | WEIGHT: 194 LBS | SYSTOLIC BLOOD PRESSURE: 130 MMHG | BODY MASS INDEX: 32.32 KG/M2

## 2017-08-17 DIAGNOSIS — G25.0 ESSENTIAL TREMOR: ICD-10-CM

## 2017-08-17 DIAGNOSIS — G56.01 CARPAL TUNNEL SYNDROME OF RIGHT WRIST: ICD-10-CM

## 2017-08-17 DIAGNOSIS — M79.7 FIBROMYALGIA: ICD-10-CM

## 2017-08-17 DIAGNOSIS — R51.9 CHRONIC DAILY HEADACHE: Primary | ICD-10-CM

## 2017-08-17 PROCEDURE — 99215 OFFICE O/P EST HI 40 MIN: CPT | Performed by: PSYCHIATRY & NEUROLOGY

## 2017-08-17 RX ORDER — PREGABALIN 100 MG/1
CAPSULE ORAL
Qty: 60 CAPSULE | Refills: 2 | Status: SHIPPED | OUTPATIENT
Start: 2017-08-17 | End: 2020-06-15

## 2017-08-17 RX ORDER — MILNACIPRAN HYDROCHLORIDE 50 MG/1
TABLET, FILM COATED ORAL
Status: ON HOLD | COMMUNITY
Start: 2017-07-30 | End: 2017-10-05

## 2017-08-17 RX ORDER — HYDROCODONE BITARTRATE AND ACETAMINOPHEN 5; 325 MG/1; MG/1
TABLET ORAL
Status: ON HOLD | COMMUNITY
Start: 2017-08-08 | End: 2017-10-05 | Stop reason: HOSPADM

## 2017-08-22 ENCOUNTER — HOSPITAL ENCOUNTER (OUTPATIENT)
Dept: NUCLEAR MEDICINE | Age: 47
End: 2017-08-22
Payer: COMMERCIAL

## 2017-08-22 ENCOUNTER — HOSPITAL ENCOUNTER (OUTPATIENT)
Dept: NUCLEAR MEDICINE | Age: 47
Discharge: HOME | End: 2017-08-22
Payer: COMMERCIAL

## 2017-08-22 DIAGNOSIS — M79.7 FIBROMYALGIA: ICD-10-CM

## 2017-08-22 DIAGNOSIS — R51.9 CHRONIC DAILY HEADACHE: ICD-10-CM

## 2017-08-22 PROCEDURE — 3430000000 HC RX DIAGNOSTIC RADIOPHARMACEUTICAL: Performed by: INTERNAL MEDICINE

## 2017-08-22 PROCEDURE — 78306 BONE IMAGING WHOLE BODY: CPT

## 2017-08-22 PROCEDURE — A9561 TC99M OXIDRONATE: HCPCS | Performed by: INTERNAL MEDICINE

## 2017-08-22 RX ADMIN — TECHNETIUM TC 99M OXIDRONATE 20 MILLICURIE: 3.15 INJECTION, POWDER, LYOPHILIZED, FOR SOLUTION INTRAVENOUS at 12:30

## 2017-08-28 ENCOUNTER — TELEPHONE (OUTPATIENT)
Dept: NEUROLOGY | Age: 47
End: 2017-08-28

## 2017-09-08 ENCOUNTER — OFFICE VISIT (OUTPATIENT)
Dept: NEUROLOGY | Age: 47
End: 2017-09-08
Payer: COMMERCIAL

## 2017-09-08 VITALS
SYSTOLIC BLOOD PRESSURE: 139 MMHG | BODY MASS INDEX: 33.29 KG/M2 | WEIGHT: 195 LBS | HEART RATE: 105 BPM | DIASTOLIC BLOOD PRESSURE: 89 MMHG | HEIGHT: 64 IN

## 2017-09-08 DIAGNOSIS — M79.7 FIBROMYALGIA: ICD-10-CM

## 2017-09-08 DIAGNOSIS — G25.0 ESSENTIAL TREMOR: ICD-10-CM

## 2017-09-08 DIAGNOSIS — G56.01 CARPAL TUNNEL SYNDROME OF RIGHT WRIST: Primary | ICD-10-CM

## 2017-09-08 PROCEDURE — 99214 OFFICE O/P EST MOD 30 MIN: CPT | Performed by: PSYCHIATRY & NEUROLOGY

## 2017-09-08 RX ORDER — DULOXETIN HYDROCHLORIDE 30 MG/1
30 CAPSULE, DELAYED RELEASE ORAL DAILY
COMMUNITY
Start: 2017-08-22 | End: 2021-05-26 | Stop reason: ALTCHOICE

## 2017-09-08 RX ORDER — PREDNISONE 1 MG/1
TABLET ORAL
COMMUNITY
Start: 2017-08-22 | End: 2017-09-08 | Stop reason: ALTCHOICE

## 2017-09-14 ENCOUNTER — TRANSCRIBE ORDERS (OUTPATIENT)
Dept: ADMINISTRATIVE | Facility: HOSPITAL | Age: 47
End: 2017-09-14

## 2017-09-14 DIAGNOSIS — C81.90 HODGKIN LYMPHOMA, UNSPECIFIED HODGKIN LYMPHOMA TYPE, UNSPECIFIED BODY REGION (HCC): Primary | ICD-10-CM

## 2017-09-18 ENCOUNTER — HOSPITAL ENCOUNTER (OUTPATIENT)
Dept: CT IMAGING | Facility: HOSPITAL | Age: 47
Discharge: HOME | End: 2017-09-18
Attending: INTERNAL MEDICINE

## 2017-09-18 ENCOUNTER — HOSPITAL ENCOUNTER (OUTPATIENT)
Dept: CT IMAGING | Facility: HOSPITAL | Age: 47
Discharge: HOME | End: 2017-09-18
Attending: INTERNAL MEDICINE | Admitting: INTERNAL MEDICINE

## 2017-09-18 DIAGNOSIS — C81.90 HODGKIN LYMPHOMA, UNSPECIFIED HODGKIN LYMPHOMA TYPE, UNSPECIFIED BODY REGION (HCC): ICD-10-CM

## 2017-09-18 PROCEDURE — A9552 F18 FDG: HCPCS | Performed by: INTERNAL MEDICINE

## 2017-09-18 PROCEDURE — 78816 PET IMAGE W/CT FULL BODY: CPT

## 2017-09-18 PROCEDURE — 0 FLUDEOXYGLUCOSE F18 SOLUTION: Performed by: INTERNAL MEDICINE

## 2017-09-18 RX ADMIN — FLUDEOXYGLUCOSE F18 1 DOSE: 300 INJECTION INTRAVENOUS at 13:45

## 2017-09-25 ENCOUNTER — ANESTHESIA EVENT (OUTPATIENT)
Dept: OPERATING ROOM | Age: 47
End: 2017-09-25

## 2017-10-03 LAB
C-REACTIVE PROTEIN: 1.06 MG/DL (ref 0–0.5)
SEDIMENTATION RATE, ERYTHROCYTE: 55 MM/HR (ref 0–20)

## 2017-10-05 ENCOUNTER — ANESTHESIA (OUTPATIENT)
Dept: OPERATING ROOM | Age: 47
End: 2017-10-05
Payer: COMMERCIAL

## 2017-10-05 ENCOUNTER — HOSPITAL ENCOUNTER (OUTPATIENT)
Age: 47
Setting detail: OUTPATIENT SURGERY
Discharge: HOME | End: 2017-10-05
Attending: ORTHOPAEDIC SURGERY | Admitting: ORTHOPAEDIC SURGERY

## 2017-10-05 VITALS
DIASTOLIC BLOOD PRESSURE: 68 MMHG | RESPIRATION RATE: 22 BRPM | OXYGEN SATURATION: 98 % | SYSTOLIC BLOOD PRESSURE: 129 MMHG

## 2017-10-05 VITALS
RESPIRATION RATE: 20 BRPM | OXYGEN SATURATION: 96 % | WEIGHT: 200 LBS | SYSTOLIC BLOOD PRESSURE: 133 MMHG | DIASTOLIC BLOOD PRESSURE: 80 MMHG | HEIGHT: 64 IN | TEMPERATURE: 99.1 F | HEART RATE: 101 BPM | BODY MASS INDEX: 34.15 KG/M2

## 2017-10-05 PROBLEM — G56.01 CARPAL TUNNEL SYNDROME OF RIGHT WRIST: Status: ACTIVE | Noted: 2017-10-05

## 2017-10-05 LAB — IGG 4: 91 MG/DL (ref 1–123)

## 2017-10-05 PROCEDURE — G8907 PT DOC NO EVENTS ON DISCHARG: HCPCS

## 2017-10-05 PROCEDURE — 01810 ANES PX NRV MUSC F/ARM WRST: CPT | Performed by: NURSE ANESTHETIST, CERTIFIED REGISTERED

## 2017-10-05 PROCEDURE — 64721 CARPAL TUNNEL SURGERY: CPT

## 2017-10-05 PROCEDURE — G8916 PT W IV AB GIVEN ON TIME: HCPCS

## 2017-10-05 RX ORDER — HYDROCODONE BITARTRATE AND ACETAMINOPHEN 5; 325 MG/1; MG/1
2 TABLET ORAL PRN
Status: COMPLETED | OUTPATIENT
Start: 2017-10-05 | End: 2017-10-05

## 2017-10-05 RX ORDER — ONDANSETRON 2 MG/ML
4 INJECTION INTRAMUSCULAR; INTRAVENOUS
Status: DISCONTINUED | OUTPATIENT
Start: 2017-10-05 | End: 2017-10-05 | Stop reason: HOSPADM

## 2017-10-05 RX ORDER — FENTANYL CITRATE 50 UG/ML
INJECTION, SOLUTION INTRAMUSCULAR; INTRAVENOUS PRN
Status: DISCONTINUED | OUTPATIENT
Start: 2017-10-05 | End: 2017-10-05 | Stop reason: SDUPTHER

## 2017-10-05 RX ORDER — MIDAZOLAM HYDROCHLORIDE 1 MG/ML
INJECTION INTRAMUSCULAR; INTRAVENOUS PRN
Status: DISCONTINUED | OUTPATIENT
Start: 2017-10-05 | End: 2017-10-05 | Stop reason: SDUPTHER

## 2017-10-05 RX ORDER — FENTANYL CITRATE 50 UG/ML
50 INJECTION, SOLUTION INTRAMUSCULAR; INTRAVENOUS EVERY 5 MIN PRN
Status: DISCONTINUED | OUTPATIENT
Start: 2017-10-05 | End: 2017-10-05 | Stop reason: HOSPADM

## 2017-10-05 RX ORDER — MEPERIDINE HYDROCHLORIDE 25 MG/ML
12.5 INJECTION INTRAMUSCULAR; INTRAVENOUS; SUBCUTANEOUS EVERY 5 MIN PRN
Status: DISCONTINUED | OUTPATIENT
Start: 2017-10-05 | End: 2017-10-05 | Stop reason: HOSPADM

## 2017-10-05 RX ORDER — HYDROCODONE BITARTRATE AND ACETAMINOPHEN 7.5; 325 MG/1; MG/1
1 TABLET ORAL EVERY 4 HOURS PRN
Qty: 60 TABLET | Refills: 0 | Status: SHIPPED | OUTPATIENT
Start: 2017-10-05 | End: 2017-10-12

## 2017-10-05 RX ORDER — HYDROCODONE BITARTRATE AND ACETAMINOPHEN 5; 325 MG/1; MG/1
1 TABLET ORAL PRN
Status: COMPLETED | OUTPATIENT
Start: 2017-10-05 | End: 2017-10-05

## 2017-10-05 RX ORDER — OXYCODONE HYDROCHLORIDE AND ACETAMINOPHEN 5; 325 MG/1; MG/1
2 TABLET ORAL PRN
Status: DISCONTINUED | OUTPATIENT
Start: 2017-10-05 | End: 2017-10-05 | Stop reason: HOSPADM

## 2017-10-05 RX ORDER — CEFAZOLIN SODIUM 1 G/3ML
INJECTION, POWDER, FOR SOLUTION INTRAMUSCULAR; INTRAVENOUS PRN
Status: DISCONTINUED | OUTPATIENT
Start: 2017-10-05 | End: 2017-10-05 | Stop reason: SDUPTHER

## 2017-10-05 RX ORDER — OXYCODONE HYDROCHLORIDE AND ACETAMINOPHEN 5; 325 MG/1; MG/1
1 TABLET ORAL PRN
Status: DISCONTINUED | OUTPATIENT
Start: 2017-10-05 | End: 2017-10-05 | Stop reason: HOSPADM

## 2017-10-05 RX ORDER — HYDROMORPHONE HCL 110MG/55ML
0.5 PATIENT CONTROLLED ANALGESIA SYRINGE INTRAVENOUS EVERY 5 MIN PRN
Status: DISCONTINUED | OUTPATIENT
Start: 2017-10-05 | End: 2017-10-05 | Stop reason: HOSPADM

## 2017-10-05 RX ORDER — LABETALOL HYDROCHLORIDE 5 MG/ML
5 INJECTION, SOLUTION INTRAVENOUS EVERY 10 MIN PRN
Status: DISCONTINUED | OUTPATIENT
Start: 2017-10-05 | End: 2017-10-05 | Stop reason: HOSPADM

## 2017-10-05 RX ORDER — HYDRALAZINE HYDROCHLORIDE 20 MG/ML
5 INJECTION INTRAMUSCULAR; INTRAVENOUS EVERY 10 MIN PRN
Status: DISCONTINUED | OUTPATIENT
Start: 2017-10-05 | End: 2017-10-05 | Stop reason: HOSPADM

## 2017-10-05 RX ORDER — BUPIVACAINE HYDROCHLORIDE 2.5 MG/ML
INJECTION, SOLUTION EPIDURAL; INFILTRATION; INTRACAUDAL PRN
Status: DISCONTINUED | OUTPATIENT
Start: 2017-10-05 | End: 2017-10-05 | Stop reason: HOSPADM

## 2017-10-05 RX ORDER — ONDANSETRON 4 MG/1
8 TABLET, ORALLY DISINTEGRATING ORAL ONCE
Status: DISCONTINUED | OUTPATIENT
Start: 2017-10-05 | End: 2017-10-05 | Stop reason: HOSPADM

## 2017-10-05 RX ORDER — KETOROLAC TROMETHAMINE 30 MG/ML
INJECTION, SOLUTION INTRAMUSCULAR; INTRAVENOUS PRN
Status: DISCONTINUED | OUTPATIENT
Start: 2017-10-05 | End: 2017-10-05 | Stop reason: SDUPTHER

## 2017-10-05 RX ORDER — ONDANSETRON 2 MG/ML
INJECTION INTRAMUSCULAR; INTRAVENOUS PRN
Status: DISCONTINUED | OUTPATIENT
Start: 2017-10-05 | End: 2017-10-05 | Stop reason: SDUPTHER

## 2017-10-05 RX ORDER — SODIUM CHLORIDE, SODIUM LACTATE, POTASSIUM CHLORIDE, CALCIUM CHLORIDE 600; 310; 30; 20 MG/100ML; MG/100ML; MG/100ML; MG/100ML
INJECTION, SOLUTION INTRAVENOUS CONTINUOUS
Status: DISCONTINUED | OUTPATIENT
Start: 2017-10-05 | End: 2017-10-05 | Stop reason: HOSPADM

## 2017-10-05 RX ORDER — PROPOFOL 10 MG/ML
INJECTION, EMULSION INTRAVENOUS PRN
Status: DISCONTINUED | OUTPATIENT
Start: 2017-10-05 | End: 2017-10-05 | Stop reason: SDUPTHER

## 2017-10-05 RX ORDER — DIPHENHYDRAMINE HYDROCHLORIDE 50 MG/ML
12.5 INJECTION INTRAMUSCULAR; INTRAVENOUS
Status: DISCONTINUED | OUTPATIENT
Start: 2017-10-05 | End: 2017-10-05 | Stop reason: HOSPADM

## 2017-10-05 RX ORDER — HYDROMORPHONE HCL 110MG/55ML
0.25 PATIENT CONTROLLED ANALGESIA SYRINGE INTRAVENOUS EVERY 5 MIN PRN
Status: DISCONTINUED | OUTPATIENT
Start: 2017-10-05 | End: 2017-10-05 | Stop reason: HOSPADM

## 2017-10-05 RX ADMIN — HYDROCODONE BITARTRATE AND ACETAMINOPHEN 1 TABLET: 5; 325 TABLET ORAL at 10:53

## 2017-10-05 RX ADMIN — KETOROLAC TROMETHAMINE 30 MG: 30 INJECTION, SOLUTION INTRAMUSCULAR; INTRAVENOUS at 10:06

## 2017-10-05 RX ADMIN — SODIUM CHLORIDE, SODIUM LACTATE, POTASSIUM CHLORIDE, CALCIUM CHLORIDE: 600; 310; 30; 20 INJECTION, SOLUTION INTRAVENOUS at 08:42

## 2017-10-05 RX ADMIN — PROPOFOL 100 MG: 10 INJECTION, EMULSION INTRAVENOUS at 09:52

## 2017-10-05 RX ADMIN — KETOROLAC TROMETHAMINE 30 MG: 30 INJECTION, SOLUTION INTRAMUSCULAR; INTRAVENOUS at 10:04

## 2017-10-05 RX ADMIN — FENTANYL CITRATE 50 MCG: 50 INJECTION, SOLUTION INTRAMUSCULAR; INTRAVENOUS at 09:50

## 2017-10-05 RX ADMIN — ONDANSETRON 4 MG: 2 INJECTION INTRAMUSCULAR; INTRAVENOUS at 09:59

## 2017-10-05 RX ADMIN — CEFAZOLIN SODIUM 1000 MG: 1 INJECTION, POWDER, FOR SOLUTION INTRAMUSCULAR; INTRAVENOUS at 09:49

## 2017-10-05 RX ADMIN — MIDAZOLAM HYDROCHLORIDE 1 MG: 1 INJECTION INTRAMUSCULAR; INTRAVENOUS at 09:50

## 2017-10-05 RX ADMIN — PROPOFOL 80 MG: 10 INJECTION, EMULSION INTRAVENOUS at 09:51

## 2017-10-05 ASSESSMENT — PAIN SCALES - GENERAL: PAINLEVEL_OUTOF10: 5

## 2017-10-05 NOTE — IP AVS SNAPSHOT
Patient Information     Patient Name PALMER Lord 1970         This is your updated medication list to keep with you all times      TAKE these medications     DULoxetine 60 MG extended release capsule   Commonly known as:  CYMBALTA       GOODYS EXTRA STRENGTH 500-325-65 MG Pack   Generic drug:  Aspirin-Acetaminophen-Caffeine       HYDROcodone-acetaminophen 7.5-325 MG per tablet   Commonly known as:  NORCO   Take 1 tablet by mouth every 4 hours as needed for Pain .       lansoprazole 30 MG delayed release capsule   Commonly known as:  PREVACID       pregabalin 100 MG capsule   Commonly known as:  LYRICA   Take one each pm       SYNTHROID 100 MCG tablet   Generic drug:  levothyroxine         ASK your doctor about these medications     cyanocobalamin 1000 MCG tablet       SAVELLA 50 MG Tabs   Generic drug:  milnacipran HCl       topiramate 25 MG tablet   Commonly known as:  TOPAMAX   As directed

## 2017-10-05 NOTE — ANESTHESIA POSTPROCEDURE EVALUATION
Department of Anesthesiology  Postprocedure Note    Patient: Shimon Perez  MRN: [id number]  YOB: 1970  Date of evaluation: 10/5/2017  Time:  10:29 AM     Procedure Summary     Date Anesthesia Start Anesthesia Stop Room / Location    10/05/17 0947 1029 [facility] OR 04 / [facility] OR       Procedure Diagnosis Surgeon Responsible Provider    CARPAL TUNNEL RELEASE (Right Hand) (CARPAL TUNNEL SYNDROME ) DO Genesis Nolasco CRNA          Anesthesia Type: general    Siddhartha Phase I:      Siddhartha Phase II: Siddhartha Score: 9    Last vitals:  BP (!) 148/83 (10/05/17 1026)    Temp      Pulse 107 (10/05/17 1026)   Resp 20 (10/05/17 1026)    SpO2 96 % (10/05/17 1026)      Anesthesia Post Evaluation    Patient location during evaluation: bedside  Patient participation: complete - patient participated  Level of consciousness: awake  Airway patency: patent  Nausea & Vomiting: no nausea and no vomiting  Complications: no  Cardiovascular status: blood pressure returned to baseline  Respiratory status: acceptable and room air  Hydration status: euvolemic

## 2017-10-05 NOTE — IP AVS SNAPSHOT
After Visit Summary  (Discharge Instructions)    Medication List for Home    Based on the information you provided to us as well as any changes during this visit, the following is your updated medication list.  Compare this with your prescription bottles at home. If you have any questions or concerns, contact your primary care physician's office. Daily Medication List (This medication list can be shared with any healthcare provider who is helping you manage your medications)      There are NEW medications for you. START taking them after you leave the hospital        Last Dose    Next Dose Due AM NOON PM NIGHT    HYDROcodone-acetaminophen 7.5-325 MG per tablet   Commonly known as:  NORCO   Take 1 tablet by mouth every 4 hours as needed for Pain .    Replaces:  HYDROcodone-acetaminophen 5-325 MG per tablet                                           These are medications you told us you were taking at home, CONTINUE taking them after you leave the hospital        Last Dose    Next Dose Due AM NOON PM NIGHT    DULoxetine 60 MG extended release capsule   Commonly known as:  CYMBALTA                                         GOODYS EXTRA STRENGTH 500-325-65 MG Pack   Generic drug:  Aspirin-Acetaminophen-Caffeine   Take by mouth                                         lansoprazole 30 MG delayed release capsule   Commonly known as:  PREVACID                                         pregabalin 100 MG capsule   Commonly known as:  LYRICA   Take one each pm                                         SYNTHROID 100 MCG tablet   Generic drug:  levothyroxine   Take 100 mcg by mouth daily                                           ASK your doctor about these medications if you have questions        Last Dose    Next Dose Due AM NOON PM NIGHT    cyanocobalamin 1000 MCG tablet   Take 1,000 mcg by mouth daily                                         SAVELLA 50 MG Tabs   Generic drug:  milnacipran HCl topiramate 25 MG tablet   Commonly known as:  TOPAMAX   As directed                                           These are the medications you have told us you were taking at home, STOP taking them after you leave the hospital     HYDROcodone-acetaminophen 5-325 MG per tablet   Commonly known as:  Jerl Ards   Replaced by:  HYDROcodone-acetaminophen 7.5-325 MG per tablet            Where to Get Your Medications      You can get these medications from any pharmacy     Bring a paper prescription for each of these medications     HYDROcodone-acetaminophen 7.5-325 MG per tablet               Allergies as of 10/5/2017        Reactions    Compazine [Prochlorperazine] Anaphylaxis    Prochlorperazine Edisylate       Immunizations as of 10/5/2017     No immunizations on file. Last Vitals          Most Recent Value    Temp  99.1 °F (37.3 °C)    Pulse  102    Resp  20    BP  (!)  142/83         After Visit Summary    This summary was created for you. Thank you for entrusting your care to us. The following information includes details about your hospital/visit stay along with steps you should take to help with your recovery once you leave the hospital.  In this packet, you will find information about the topics listed below:    · Instructions about your medications including a list of your home medications  · A summary of your hospital visit  · Follow-up appointments once you have left the hospital  · Your care plan at home      You may receive a survey regarding the care you received during your stay. Your input is valuable to us. We encourage you to complete and return your survey in the envelope provided. We hope you will choose us in the future for your healthcare needs.           Patient Information     Patient Name PALMER Gross Estimable 1970      Care Provided at:     Name Address Phone       [address] [facility] [place] [address] [phone]            Your Visit ? Review your current list of  medications, immunization, and allergies. ? Review your future test results online . ? Review your discharge instructions provided by your caregivers at discharge    Certain functionality such as prescription refills, scheduling appointments or sending messages to your provider are not activated if your provider does not use [facility] in his/her office    For questions regarding your MyChart account call [phone]. If you have a clinical question, please call your doctor's office. The information on all pages of the After Visit Summary has been reviewed with me, the patient and/or responsible adult, by my health care provider(s). I had the opportunity to ask questions regarding this information. I understand I should dispose of my armband safely at home to protect my health information. A complete copy of the After Visit Summary has been given to me, the patient and/or responsible adult.            Patient Signature/Responsible Adult:____________________    Clinician Signature:_____________________    Date:_____________________    Time:_____________________

## 2017-10-05 NOTE — H&P
has never smoked. She does not have any smokeless tobacco history on file. Alcohol:  reports that she does not drink alcohol. Review of Systems:  General: Denies any fever or chills  EYES: Denies any diplopia  ENT: Tinnitus or vertigo  Resp: Denies any shortness of breath, cough or wheezing  Cardiac: Denies any chest pain, palpitations, claudication or edema  GI: Denies any melena, hematochezia, hematemesis or pyrosis  : Denies any frequency, urgency, hesitancy or incontinence  Musculoskeletal: Denies back pain, joint pain, myalgias  Heme: Denies bruising or bleeding easily  Endocrine: Denies any history of diabetes or thyroid disease  Psych: Denies anxiety or depression  Neuro: Denies any focal motor or sensory deficits      Physical Exam:  Vitals: There were no vitals taken for this visit. CONSTITUTIONAL: Alert, appropriate, no acute distress. PSYCH: mood and affect are normal with a normal rate and tone of speech  EYES: Non icteric, EOM intact, pupils equal round and reactive to light  ENT: Mucus membranes moist, no oral pharyngeal lesions, nares patent   NECK: Supple, no masses, no JVD, trachea mid line   CHEST/LUNGS: CTA bilaterally, normal respiratory effort   CARDIOVASCULAR: RRR, no murmurs,  2+ DP and radial pulses bilaterally  ABDOMEN: soft, nontender  EXTREMITIES: warm, well perfused, no edema. Joint with mildly reduced range of motion and generalized tenderness.   Neurovascular exam normal  SKIN: warm, dry with no rashes or lesions  LYMPH: No cervical or inguinal lymphadenopathy    Laboratory:  CBC :    Lab Results   Component Value Date    WBC 14.3 07/10/2017    HGB 12.6 07/10/2017    HCT 38.0 07/10/2017     07/10/2017     BMP:   Lab Results   Component Value Date     07/10/2017    K 3.9 07/10/2017     07/10/2017    CO2 22 07/10/2017    BUN 11 07/10/2017    LABALBU 3.8 07/10/2017    CREATININE 0.9 07/10/2017    CALCIUM 8.8 07/10/2017    LABGLOM >60 07/10/2017    GLUCOSE 103 07/10/2017     PT/INR:  No results found for: PROTIME, INR  U/A: No results found for: NITRITE, WBCUA, RBCUA, BACTERIA  HgBA1c:  No components found for: HGBA1C    Radiology: EMG right upper extremity reveals right carpal tunnel syndrome    IMPRESSION:  Right carpal tunnel syndrome    PLAN: A lengthy discussion was carried out with the patient and the patient has agreed to proceed with the purposed operative procedure of right carpal tunnel release    Permit and pre-operative orders where completed in the office. Patient will be re-evaluated in the pre-operative holding area.         Provider: Parminder Mayberry  Date: 10/5/2017

## 2017-10-05 NOTE — IP AVS SNAPSHOT
Patient Information     Patient Name PALMER Souza Shall 1970      SEDATION/ANALGESIA INFORMATION/HOME GOING ADVICE     SEDATION / ANALGESIA INFORMATION / Sean Nolasco have received the sedation/analgesia medication during your visit    Sedation/analgesia is used during short medical procedures under controlled supervision. The medication will produce a strong relaxation. You will be able to hear, speak and follow instructions, but your memory and alertness will be decreased. You will be able to swallow and breathe on your own. During sedation/analgesia your blood pressure, heart and breathing will be watched closely. After the procedure, you may not remember what was said or done. You may have the following effects from the medication. \" Drowsiness, dizziness, sleepiness or confusion. \" Difficulty remembering or delayed reaction times. \" Loss of fine muscle control or difficulty with your balance especially while walking. \" Difficulty focusing or blurred vision. You may not be aware of slight changes in your behavior and/or your reaction time because of the medication used during the procedure. Therefore you should follow these instructions. \" Have someone responsible help you with your care. \" Do not drive for 24 hours. \" Do not operate equipment for 24 hours (lawnmowers, power tools, kitchen accessories, stove). \" Do not drink any alcoholic beverages for a minimum of 24 hours. \" Do not make important personal, legal or business decisions for 24 hours. \" You may experience dizziness or lightheadedness. Move slowly and carefully, do not make sudden position changes. \" Drink extra amounts of fluids today. \" Increase your diet as tolerated (unless you have received specific instructions from your doctor). \" If you feel nauseated, continue with liquids until the nausea is gone. \" Notify your physician if you have not urinated within 8 hours after the procedure.

## 2017-10-05 NOTE — ANESTHESIA PRE PROCEDURE
Department of Anesthesiology  Preprocedure Note       Name:  Agustin Goetz   Age:  52 y.o.  :  1970                                          MRN:  [id number]         Date:  10/5/2017      Surgeon: Francoise Barron):  Porter Lim DO    Procedure: Procedure(s):  CARPAL TUNNEL RELEASE    Medications prior to admission:   Prior to Admission medications    Medication Sig Start Date End Date Taking? Authorizing Provider   DULoxetine (CYMBALTA) 60 MG extended release capsule  17   Historical Provider, MD   lansoprazole (PREVACID) 30 MG delayed release capsule  17   Historical Provider, MD   HYDROcodone-acetaminophen [facility]) 5-325 MG per tablet  17   Historical Provider, MD   pregabalin (LYRICA) 100 MG capsule Take one each pm 17   Kaycee Ang MD   Aspirin-Acetaminophen-Caffeine ([address]) [phone] MG PACK Take by mouth    Historical Provider, MD   SYNTHROID 100 MCG tablet Take 100 mcg by mouth daily  17   Historical Provider, MD       Current medications:    Current Facility-Administered Medications   Medication Dose Route Frequency Provider Last Rate Last Dose    lactated ringers infusion   Intravenous Continuous Davin Tipton, CRNA 125 mL/hr at 10/05/17 0842         Allergies:     Allergies   Allergen Reactions    Compazine [Prochlorperazine] Anaphylaxis    Prochlorperazine Edisylate        Problem List:    Patient Active Problem List   Diagnosis Code    Abnormal brain MRI R90.89    Elevated prolactin level (HCC) E22.9    Chronic daily headache R51    Essential tremor G25.0    Numbness and tingling R20.0, R20.2    Hand pain, right M79.641    Weakness of hand R29.898       Past Medical History:        Diagnosis Date    Anxiety     Depression     Diabetes ([facility]     borderline in the past    GERD (gastroesophageal reflux disease)     Hodgkin's lymphoma ([facility] 24 years ago    Tx with XRT    Hyperprolactinemia ([facility]     Hypothyroidism     Multiple thyroid nodules     Nonalcoholic fatty liver disease        Past Surgical History:        Procedure Laterality Date    ANKLE SURGERY      3 screws    CHOLECYSTECTOMY      COLONOSCOPY      OTHER SURGICAL HISTORY      exploritory surgery for ganesh right side of neck    SPLENECTOMY      TONSILLECTOMY      TUBAL LIGATION      TYMPANOSTOMY TUBE PLACEMENT         Social History:    Social History   Substance Use Topics    Smoking status: Never Smoker    Smokeless tobacco: Not on file    Alcohol use Yes      Comment: occ                                Counseling given: Not Answered      Vital Signs (Current):   Vitals:    10/05/17 0835   BP: (!) 148/87   Pulse: 97   Resp: 20   Temp: 99.1 °F (37.3 °C)   TempSrc: Temporal   SpO2: 100%   Weight: 200 lb (90.7 kg)   Height: 5' 4\" (1.626 m)                                              BP Readings from Last 3 Encounters:   10/05/17 (!) 148/87   09/08/17 139/89   08/17/17 130/85       NPO Status: Time of last liquid consumption: 2300                        Time of last solid consumption: 2300                        Date of last liquid consumption: 10/04/17                        Date of last solid food consumption: 10/04/17    BMI:   Wt Readings from Last 3 Encounters:   10/05/17 200 lb (90.7 kg)   09/08/17 195 lb (88.5 kg)   08/17/17 194 lb (88 kg)     Body mass index is 34.33 kg/(m^2).     CBC:   Lab Results   Component Value Date    WBC 14.3 07/10/2017    RBC 4.16 07/10/2017    HGB 12.6 07/10/2017    HCT 38.0 07/10/2017    MCV 91.3 07/10/2017    RDW 15.0 07/10/2017     07/10/2017       CMP:   Lab Results   Component Value Date     07/10/2017    K 3.9 07/10/2017     07/10/2017    CO2 22 07/10/2017    BUN 11 07/10/2017    CREATININE 0.9 07/10/2017    LABGLOM >60 07/10/2017    GLUCOSE 103 07/10/2017    PROT 9.4 07/10/2017    CALCIUM 8.8 07/10/2017    BILITOT 0.5 07/10/2017    ALKPHOS 94 07/10/2017    AST 32 07/10/2017    ALT 32 07/10/2017       POC Tests: No results for input(s): POCGLU, POCNA, POCK, POCCL, POCBUN, POCHEMO, POCHCT in the last 72 hours. Coags: No results found for: PROTIME, INR, APTT    HCG (If Applicable):   Lab Results   Component Value Date    PREGTESTUR Negative 07/10/2017        ABGs: No results found for: PHART, PO2ART, FCI7FDA, TBX3JJY, BEART, R1QVLPSM     Type & Screen (If Applicable):  No results found for: [facility]    Anesthesia Evaluation  Patient summary reviewed and Nursing notes reviewed  Airway: Mallampati: I     Neck ROM: full  Mouth opening: > = 3 FB Dental: normal exam         Pulmonary:negative ROS and normal exam           Cardiovascular:negative ROS    (+) dysrhythmias:,                Neuro/Psych:   (+) headaches:, psychiatric history:   GI/Hepatic/Renal: neg ROS  (+) GERD:, liver disease:,         Endo/Other: negative ROS   (+) hypothyroidism::.      Abdominal:                    Anesthesia Plan    ASA 2     general   (Had heart ablation over a year ago at [facility]. Was released from cardiologist care. Denies chest pain.)  intravenous induction   Anesthetic plan and risks discussed with patient, spouse and child/children.             Lisa Snyder CRNA   10/5/2017

## 2017-11-29 ENCOUNTER — ANESTHESIA (OUTPATIENT)
Dept: OPERATING ROOM | Age: 47
End: 2017-11-29
Payer: COMMERCIAL

## 2017-11-29 ENCOUNTER — HOSPITAL ENCOUNTER (OUTPATIENT)
Age: 47
Setting detail: OUTPATIENT SURGERY
Discharge: HOME | End: 2017-11-29
Attending: INTERNAL MEDICINE | Admitting: INTERNAL MEDICINE

## 2017-11-29 ENCOUNTER — ANESTHESIA EVENT (OUTPATIENT)
Dept: OPERATING ROOM | Age: 47
End: 2017-11-29

## 2017-11-29 VITALS
BODY MASS INDEX: 34.15 KG/M2 | HEIGHT: 64 IN | TEMPERATURE: 98.4 F | OXYGEN SATURATION: 98 % | SYSTOLIC BLOOD PRESSURE: 128 MMHG | HEART RATE: 100 BPM | RESPIRATION RATE: 16 BRPM | DIASTOLIC BLOOD PRESSURE: 88 MMHG | WEIGHT: 200 LBS

## 2017-11-29 VITALS — SYSTOLIC BLOOD PRESSURE: 93 MMHG | DIASTOLIC BLOOD PRESSURE: 51 MMHG | OXYGEN SATURATION: 99 %

## 2017-11-29 PROCEDURE — G8918 PT W/O PREOP ORDER IV AB PRO: HCPCS

## 2017-11-29 PROCEDURE — 01112 ANES BONE MARROW ASPIR&/BX: CPT | Performed by: NURSE ANESTHETIST, CERTIFIED REGISTERED

## 2017-11-29 PROCEDURE — G8907 PT DOC NO EVENTS ON DISCHARG: HCPCS

## 2017-11-29 PROCEDURE — 38221 DX BONE MARROW BIOPSIES: CPT

## 2017-11-29 RX ORDER — PROPOFOL 10 MG/ML
INJECTION, EMULSION INTRAVENOUS PRN
Status: DISCONTINUED | OUTPATIENT
Start: 2017-11-29 | End: 2017-11-29 | Stop reason: SDUPTHER

## 2017-11-29 RX ORDER — SODIUM CHLORIDE, SODIUM LACTATE, POTASSIUM CHLORIDE, CALCIUM CHLORIDE 600; 310; 30; 20 MG/100ML; MG/100ML; MG/100ML; MG/100ML
INJECTION, SOLUTION INTRAVENOUS CONTINUOUS
Status: DISCONTINUED | OUTPATIENT
Start: 2017-11-29 | End: 2017-11-29 | Stop reason: HOSPADM

## 2017-11-29 RX ORDER — LIDOCAINE HYDROCHLORIDE 20 MG/ML
INJECTION, SOLUTION EPIDURAL; INFILTRATION; INTRACAUDAL; PERINEURAL PRN
Status: DISCONTINUED | OUTPATIENT
Start: 2017-11-29 | End: 2017-11-29 | Stop reason: HOSPADM

## 2017-11-29 RX ADMIN — SODIUM CHLORIDE, SODIUM LACTATE, POTASSIUM CHLORIDE, CALCIUM CHLORIDE: 600; 310; 30; 20 INJECTION, SOLUTION INTRAVENOUS at 14:07

## 2017-11-29 RX ADMIN — PROPOFOL 200 MG: 10 INJECTION, EMULSION INTRAVENOUS at 14:22

## 2017-11-29 ASSESSMENT — PAIN SCALES - GENERAL
PAINLEVEL_OUTOF10: 0
PAINLEVEL_OUTOF10: 0

## 2017-11-29 NOTE — ANESTHESIA PRE PROCEDURE
07/10/2017    BILITOT 0.5 07/10/2017    ALKPHOS 94 07/10/2017    AST 32 07/10/2017    ALT 32 07/10/2017       POC Tests: No results for input(s): POCGLU, POCNA, POCK, POCCL, POCBUN, POCHEMO, POCHCT in the last 72 hours. Coags: No results found for: PROTIME, INR, APTT    HCG (If Applicable):   Lab Results   Component Value Date    PREGTESTUR Negative 07/10/2017        ABGs: No results found for: PHART, PO2ART, GPO2UES, EPA3GDJ, BEART, T8ROZNIQ     Type & Screen (If Applicable):  No results found for: LABABO, [address]    Anesthesia Evaluation  Patient summary reviewed and Nursing notes reviewed  Airway: Mallampati: I     Neck ROM: full  Mouth opening: > = 3 FB Dental:          Pulmonary:Negative Pulmonary ROS and normal exam                               Cardiovascular:Negative CV ROS                      Neuro/Psych:   (+) neuromuscular disease:, headaches:, psychiatric history:            GI/Hepatic/Renal:   (+) GERD:, liver disease:,           Endo/Other:    (+) hypothyroidism::., .                 Abdominal:           Vascular: negative vascular ROS. Anesthesia Plan      MAC     ASA 2       Induction: intravenous. Anesthetic plan and risks discussed with patient.                       Courtney Meza, CRNA   11/29/2017

## 2017-11-29 NOTE — PROCEDURES
Richard Jara is a 52 y.o. female patient. No diagnosis found. Past Medical History:   Diagnosis Date    Anxiety     Carpal tunnel syndrome of right wrist 10/5/2017    Depression     Diabetes (HCC)     borderline in the past    GERD (gastroesophageal reflux disease)     Hodgkin's lymphoma (HCC) 24 years ago    Tx with XRT    Hyperprolactinemia (HCC)     Hypothyroidism     Multiple thyroid nodules     Nonalcoholic fatty liver disease      Blood pressure 132/85, pulse 102, resp. rate 14, height 5' 4\" (1.626 m), weight 200 lb (90.7 kg), SpO2 98 %. Procedures      Patient name: Richard Jara  Patient : 1970      Procedure Note: Bone Marrow Aspirate and Biopsy    Indication:  thrombocytopenia    Informed consent was signed by the patient. She was placed in the left lateral decubitus position. The patient was prepped and draped in sterile fashion. Lidocaine 1% was used for local anesthetic of the skin and periosteum. A bone marrow aspirate and biopsy was obtained and sent for surgical pathology review,  flow cytometry, and chromosome analysis. The total blood loss was less then 3 mL. The skin was cleaned and a sterile bandage was placed on the entrance site. The patient tolerated the procedure without complication.      Sea Hough    17  2:32 PM    Sea Hough PA-C  2017

## 2017-11-29 NOTE — ANESTHESIA POSTPROCEDURE EVALUATION
Department of Anesthesiology  Postprocedure Note    Patient: Babetta Romberg  MRN: [id number]  YOB: 1970  Date of evaluation: 11/29/2017  Time:  2:35 PM     Procedure Summary     Date:  11/29/17 Room / Location:  [facility] OR  / [facility] OR    Anesthesia Start:  1419 Anesthesia Stop:      Procedure:  BONE MARROW ASPIRATION BIOPSY (N/A ) Diagnosis:  ( )    Surgeon:  Conner Kaufman MD Responsible Provider:  Satinder Zhang CRNA    Anesthesia Type:  MAC ASA Status:  2          Anesthesia Type: No value filed. Siddhartha Phase I: Siddhartha Score: 10    Siddhartha Phase II:      Last vitals: Reviewed and per EMR flowsheets.        Anesthesia Post Evaluation    Patient location during evaluation: bedside  Patient participation: waiting for patient participation  Level of consciousness: sleepy but conscious  Pain score: 0  Airway patency: patent  Nausea & Vomiting: no nausea and no vomiting  Complications: no  Cardiovascular status: blood pressure returned to baseline  Respiratory status: acceptable  Hydration status: euvolemic

## 2019-07-17 ENCOUNTER — HOSPITAL ENCOUNTER (OUTPATIENT)
Dept: CT IMAGING | Age: 49
Discharge: HOME | End: 2019-07-17
Payer: COMMERCIAL

## 2019-07-17 VITALS
SYSTOLIC BLOOD PRESSURE: 122 MMHG | WEIGHT: 198 LBS | OXYGEN SATURATION: 98 % | DIASTOLIC BLOOD PRESSURE: 66 MMHG | HEART RATE: 90 BPM | BODY MASS INDEX: 32.99 KG/M2 | HEIGHT: 65 IN

## 2019-07-17 DIAGNOSIS — C82.11 FOLLICULAR LYMPHOMA GRADE II OF LYMPH NODES OF HEAD, FACE, AND NECK (HCC): ICD-10-CM

## 2019-07-17 PROBLEM — C85.90 NON HODGKIN'S LYMPHOMA (HCC): Status: ACTIVE | Noted: 2019-07-17

## 2019-07-17 PROBLEM — D72.829 LEUKOCYTOSIS: Status: ACTIVE | Noted: 2019-07-17

## 2019-07-17 PROCEDURE — 71260 CT THORAX DX C+: CPT

## 2019-07-17 PROCEDURE — 6360000004 HC RX CONTRAST MEDICATION: Performed by: INTERNAL MEDICINE

## 2019-07-17 RX ADMIN — IOPAMIDOL 90 ML: 755 INJECTION, SOLUTION INTRAVENOUS at 13:49

## 2019-08-06 ENCOUNTER — TRANSCRIBE ORDERS (OUTPATIENT)
Dept: ADMINISTRATIVE | Facility: HOSPITAL | Age: 49
End: 2019-08-06

## 2019-08-06 DIAGNOSIS — M79.641 RIGHT HAND PAIN: Primary | ICD-10-CM

## 2019-08-13 ENCOUNTER — HOSPITAL ENCOUNTER (OUTPATIENT)
Dept: MRI IMAGING | Facility: HOSPITAL | Age: 49
Discharge: HOME | End: 2019-08-13
Admitting: INTERNAL MEDICINE

## 2019-08-13 DIAGNOSIS — M79.641 RIGHT HAND PAIN: ICD-10-CM

## 2019-08-13 LAB — CREAT BLDA-MCNC: 1 MG/DL (ref 0.6–1.3)

## 2019-08-13 PROCEDURE — A9577 INJ MULTIHANCE: HCPCS | Performed by: INTERNAL MEDICINE

## 2019-08-13 PROCEDURE — 0 GADOBENATE DIMEGLUMINE 529 MG/ML SOLUTION: Performed by: INTERNAL MEDICINE

## 2019-08-13 PROCEDURE — 82565 ASSAY OF CREATININE: CPT

## 2019-08-13 PROCEDURE — 73220 MRI UPPR EXTREMITY W/O&W/DYE: CPT

## 2019-08-13 RX ADMIN — GADOBENATE DIMEGLUMINE 18 ML: 529 INJECTION, SOLUTION INTRAVENOUS at 08:00

## 2020-06-12 NOTE — PROGRESS NOTES
karyotype   No evidence of involvement of Hodgkin's lymphoma     TUMOR HISTORY: Stage IIA nodular sclerosing Hodgkins lymphoma diagnosed 06/11/92  Marlene was seen in initial oncology consultation on 6/26/1992 with a diagnosis of right supraclavicular nodular sclerosing Hodgkin's lymphoma for recommendations. Marlene presented  in her seventh month of her first pregnancy in the early part of 1992 with a palpable  right supraclavicular lymph node. After delivery on 05/11/92 she sought attention for this. A right supraclavicular lymph node biopsy on 06/11/92   Pathology revealed a nodular sclerosing Hodgkins disease. Disease involved that area as well as the mediastinum. A bone marrow biopsy and aspirate did not show any evidence of metastatic disease. A staging laparotomy on 7/6/1992 that included lymph nodes biopsies and a splenectomy did not reveal any infradiaphragmatic malignant pathology. There were miliary granulomatous lesions noted throughout the spleen and on the surface of the liver, but no evidence of Hodgkins. She was referred to radiation therapy and was cared for by Dr. Camila Easley in [place], [place]. He delivered approximately 3,960 cGy with a boost to the right neck. Her radiation therapy was in the mantle and periaortic lymph node fashion. She has been monitored conservatively since that time    TREATMENT SUMMARY:  1. XRT to the right neck, as noted above       SECONDARY TUMOR MONITORING AND SCREENING:  #1  Subcentimeter pulmonary nodules   Monitored yearly and stable on CT scans in [facility]     #2  Right base of tongue nonspecific  abnormality noted on PET scan at [facility] 1/3/2019, evaluated at ENT at  [facility] by Dr. Sergio Santiago    She saw Dr. Rose Simmons and reports that a PET scan on 1/3/2019 at [facility] identifying an abnormality at the base of the tongue-throat area.    Dr. Rose Simmons referred her to Dr. Sergio Santiago - ENT at [facility] and she was seen in January with a scope that did find a small abnormality in the right tongue region but this was too small to biopsy with a follow-up arrangement made for March 2019. She did not have a follow-up with Dr. Selina Estrada. Allergies:  Compazine [prochlorperazine] and Prochlorperazine edisylate    Medicines:  Current Outpatient Medications   Medication Sig Dispense Refill    metoprolol tartrate (LOPRESSOR) 50 MG tablet Take 50 mg by mouth 2 times daily      glipiZIDE (GLUCOTROL) 5 MG tablet Take 5 mg by mouth 2 times daily (before meals)      hydroxychloroquine (PLAQUENIL) 200 MG tablet Take 200 mg by mouth daily      albuterol sulfate HFA (PROAIR HFA) 108 (90 Base) MCG/ACT inhaler Inhale 2 puffs into the lungs every 6 hours as needed for Wheezing      Biotin 45513 MCG TBDP Take by mouth      hydrOXYzine (ATARAX) 25 MG tablet Take 25 mg by mouth every 8 hours as needed for Itching      cetirizine (ZYRTEC) 10 MG tablet Take 10 mg by mouth 2 times daily as needed for Allergies      metFORMIN (GLUCOPHAGE) 500 MG tablet Take 500 mg by mouth 2 times daily (with meals)      HYDROcodone-acetaminophen (NORCO)  MG per tablet Take 1 tablet by mouth every 6 hours as needed for Pain.  DULoxetine (CYMBALTA) 30 MG extended release capsule Take 30 mg by mouth daily 30mg AM 60mg PM      lansoprazole (PREVACID SOLUTAB) 15 MG disintegrating tablet Take 15 mg by mouth daily       SYNTHROID 125 MCG tablet Take 100 mcg by mouth daily        No current facility-administered medications for this visit.         Past Medical History:      Diagnosis Date    Anxiety     Carpal tunnel syndrome of right wrist 10/5/2017    Depression     Diabetes (HCC)     borderline in the past    GERD (gastroesophageal reflux disease)     Hodgkin's lymphoma (HCC) 24 years ago    Tx with XRT    Hyperprolactinemia (HCC)     Hypothyroidism     Multiple thyroid nodules     Nonalcoholic fatty liver disease     Tachycardia     S/P cardiac ablation 4/2016        Past Surgical History:      Procedure Laterality Date    ANKLE SURGERY      3 screws    BONE MARROW BIOPSY N/A 11/29/2017    BONE MARROW ASPIRATION BIOPSY performed by Maine Duque MD at [address] [place] [address] COLONOSCOPY      OTHER SURGICAL HISTORY      exploritory surgery for ganesh right side of neck    [facility] N/CARPAL TUNNEL SURG Right 10/5/2017    CARPAL TUNNEL RELEASE performed by Joella Opitz, DO at [address] [facility] OR    SPLENECTOMY      TONSILLECTOMY      TUBAL LIGATION      TYMPANOSTOMY TUBE PLACEMENT          Family History:      Problem Relation Age of Onset    Breast Cancer Maternal Grandmother         Social History  Social History     Tobacco Use    Smoking status: Never Smoker    Smokeless tobacco: Never Used   Substance Use Topics    Alcohol use: Yes     Comment: occ    Drug use: No          Review of Systems   Constitutional: Negative for appetite change, fatigue, fever and unexpected weight change. HENT: Negative for ear pain, facial swelling, hearing loss, sinus pain, trouble swallowing and voice change. Eyes: Negative for pain, discharge, itching and visual disturbance. Respiratory: Negative for chest tightness, shortness of breath, wheezing and stridor. Cardiovascular: Negative for chest pain, palpitations and leg swelling. Gastrointestinal: Negative for abdominal pain, constipation, diarrhea, nausea, rectal pain and vomiting. Endocrine: Negative for polydipsia, polyphagia and polyuria. Genitourinary: Negative for dysuria, flank pain and hematuria. Musculoskeletal: Negative for back pain, gait problem, neck pain and neck stiffness. Skin: Negative for color change, pallor, rash and wound. Allergic/Immunologic: Negative for immunocompromised state. Neurological: Negative for dizziness, speech difficulty, weakness, light-headedness and headaches. Hematological: Does not bruise/bleed easily. BILITOT, ALKPHOS in the last 72 hours. Invalid input(s): AMYLASE,  ALB  PT/INR: No results for input(s): PROTIME, INR in the last 72 hours. APTT: No results for input(s): APTT in the last 72 hours. BNP:  No results for input(s): BNP in the last 72 hours. Ionized Calcium:No results for input(s): IONCA in the last 72 hours. Magnesium:No results for input(s): MG in the last 72 hours. Phosphorus:No results for input(s): PHOS in the last 72 hours. HgbA1C: No results for input(s): LABA1C in the last 72 hours. Hepatic: No results for input(s): ALKPHOS, ALT, AST, PROT, BILITOT, BILIDIR, LABALBU in the last 72 hours. Lactic Acid: No results for input(s): LACTA in the last 72 hours. Troponin: No results for input(s): CKTOTAL, CKMB, TROPONINT in the last 72 hours. ABGs: No results for input(s): PH, PCO2, PO2, HCO3, O2SAT in the last 72 hours. CRP:  No results for input(s): CRP in the last 72 hours. Sed Rate:  No results for input(s): SEDRATE in the last 72 hours. Cultures:   No results for input(s): CULTURE in the last 72 hours. 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 Technologist obtained history includes shortness of breath and pain. COMPARISON: 5/26/2017, 1/10/2019 (from [facility]) DLP: 500 mGy cm. Automated exposure control was utilized to minimize patient radiation dose. TECHNIQUE: Enhanced  CT images of the chest obtained with multiplanar reformats. FINDINGS: AIRWAYS/PULMONARY PARENCHYMA: The central airways are midline and patent. Right lower lobe with a 6 mm and 4 mm solid pulmonary nodule on axial series 2, image 56. These are stable compared to 1/10/2019 and 5/26/2017. Minimal biapical scarring. No pleural effusion. No pneumothorax. VASCULATURE: Thoracic aorta is normal in course and caliber.  Mild reveal evidence of palpable peripheral lymphadenopathy in the head and neck area, axilla supra or infraclavicular areas nor inguinal areas. Abdominal exam is without splenomegaly or abdominal organomegaly or masses    CBC today (6/15/2020) reveals a WBC of 18.55. Hgb 13.5 with an MCV of 95 and platelet count of 040,306. For the present, on examination, review of systems or imaging studies there is no evidence of recurrent Hodgkin's lymphoma. #2.  Right base of tongue nonspecific  abnormality noted on PET scan at [facility] 1/3/2019. She saw Dr. Jose Stearns and reports that a PET scan on 1/3/2019 at [facility] identifying an abnormality at the base of the tongue-throat area. Dr. Jose Stearns referred her to Dr. Viv Lewis - ENT at [facility] and she was seen in January of 2019 with a scope that did find a small abnormality in the right tongue region but this was too small to biopsy with a follow-up arrangement made for March 2019. She did not have a follow-up with Dr. Jose Stearns. CT neck with [facility] on 6/8/2020 at [facility] documented:  · No acute abnormality    · Small cervical chain lymph nodes as well as small nodules in the parotid gland, favorable of recurrence given patient history of lymphoma. Recommend PET-CT. Otherwise, US-guided FNA or core biopsy of the parotid nodules could be considered. Examination today (6/15/2020) does not reveal evidence of palpable peripheral lymphadenopathy in the head and neck area. Inspection and digital palpation of the base of tongue with extraction of the tongue does not disclose nodules or masses in the base of tongue or in the head and neck area that I can appreciate on exam.    A PET scan is requested    Kevin Andre has an appointment with Dr. Viv Lewis tomorrow, 6/16/2020 at [facility] for further ENT evaluation. I suspect he will again evaluate her endoscopically. #3.  Abnormal Pap smear.      She is followed at the [address] in [facility] Benedetto Brittle, MD. Electronically signed by Tami Martinez LPN on 6/23/9202 at 6:11 PM       Aneta Castro was seen today for follow-up. Diagnoses and all orders for this visit:    History of non-Hodgkin's lymphoma  -     PET CT Skull Base To Mid Thigh; Future    Pulmonary nodules  -     PET CT Skull Base To Mid Thigh; Future        Orders Placed This Encounter   Procedures    PET CT Skull Base To Mid Thigh     Standing Status:   Future     Standing Expiration Date:   8/15/2020     Order Specific Question:   Reason for exam:     Answer:   throat pain, history of lymphoma       No orders of the defined types were placed in this encounter. Return in about 2 weeks (around 6/29/2020) for F/U WITH DR Sergio Lawtno. I, Dr. King Taylor, personally performed the services described in this documentation as scribed by Tami Martinez LPN in my presence, and it is both accurate and complete.

## 2020-06-15 ENCOUNTER — HOSPITAL ENCOUNTER (OUTPATIENT)
Dept: INFUSION THERAPY | Age: 50
Discharge: HOME | End: 2020-06-15
Payer: COMMERCIAL

## 2020-06-15 ENCOUNTER — OFFICE VISIT (OUTPATIENT)
Dept: HEMATOLOGY | Age: 50
End: 2020-06-15
Payer: COMMERCIAL

## 2020-06-15 VITALS
WEIGHT: 202.4 LBS | HEART RATE: 78 BPM | HEIGHT: 65 IN | DIASTOLIC BLOOD PRESSURE: 80 MMHG | SYSTOLIC BLOOD PRESSURE: 132 MMHG | OXYGEN SATURATION: 97 % | TEMPERATURE: 97.7 F | BODY MASS INDEX: 33.72 KG/M2

## 2020-06-15 DIAGNOSIS — C85.90 NON-HODGKIN'S LYMPHOMA, UNSPECIFIED BODY REGION, UNSPECIFIED NON-HODGKIN LYMPHOMA TYPE (HCC): ICD-10-CM

## 2020-06-15 PROBLEM — Z85.72 HISTORY OF NON-HODGKIN'S LYMPHOMA: Status: ACTIVE | Noted: 2020-06-15

## 2020-06-15 PROBLEM — R91.8 PULMONARY NODULES: Status: ACTIVE | Noted: 2020-06-15

## 2020-06-15 LAB
BASOPHILS ABSOLUTE: 0.04 K/UL (ref 0.01–0.08)
BASOPHILS RELATIVE PERCENT: 0.2 % (ref 0.1–1.2)
EOSINOPHILS ABSOLUTE: 0.6 K/UL (ref 0.04–0.54)
EOSINOPHILS RELATIVE PERCENT: 3.2 % (ref 0.7–7)
HCT VFR BLD CALC: 41.8 % (ref 34.1–44.9)
HEMOGLOBIN: 13.5 G/DL (ref 11.2–15.7)
LYMPHOCYTES ABSOLUTE: 4.05 K/UL (ref 1.18–3.74)
LYMPHOCYTES RELATIVE PERCENT: 21.8 % (ref 19.3–53.1)
MCH RBC QN AUTO: 30.7 PG (ref 25.6–32.2)
MCHC RBC AUTO-ENTMCNC: 32.3 G/DL (ref 32.3–35.5)
MCV RBC AUTO: 95 FL (ref 79.4–94.8)
MONOCYTES ABSOLUTE: 2.72 K/UL (ref 0.24–0.82)
MONOCYTES RELATIVE PERCENT: 14.7 % (ref 4.7–12.5)
NEUTROPHILS ABSOLUTE: 11.14 K/UL (ref 1.56–6.13)
NEUTROPHILS RELATIVE PERCENT: 60.1 % (ref 34–71.1)
PDW BLD-RTO: 16.2 % (ref 11.7–14.4)
PLATELET # BLD: 376 K/UL (ref 182–369)
PMV BLD AUTO: 10.7 FL (ref 7.4–10.4)
RBC # BLD: 4.4 M/UL (ref 3.93–5.22)
WBC # BLD: 18.55 K/UL (ref 3.98–10.04)

## 2020-06-15 PROCEDURE — 99211 OFF/OP EST MAY X REQ PHY/QHP: CPT

## 2020-06-15 PROCEDURE — 85025 COMPLETE CBC W/AUTO DIFF WBC: CPT

## 2020-06-15 PROCEDURE — 99215 OFFICE O/P EST HI 40 MIN: CPT | Performed by: INTERNAL MEDICINE

## 2020-06-15 RX ORDER — HYDROXYCHLOROQUINE SULFATE 200 MG/1
200 TABLET, FILM COATED ORAL DAILY
COMMUNITY
End: 2021-05-26 | Stop reason: ALTCHOICE

## 2020-06-15 RX ORDER — HYDROXYZINE HYDROCHLORIDE 25 MG/1
25 TABLET, FILM COATED ORAL EVERY 8 HOURS PRN
COMMUNITY
End: 2021-05-26 | Stop reason: ALTCHOICE

## 2020-06-15 RX ORDER — ALBUTEROL SULFATE 90 UG/1
2 AEROSOL, METERED RESPIRATORY (INHALATION) EVERY 6 HOURS PRN
COMMUNITY

## 2020-06-15 RX ORDER — HYDROCODONE BITARTRATE AND ACETAMINOPHEN 10; 325 MG/1; MG/1
1 TABLET ORAL EVERY 6 HOURS PRN
COMMUNITY
End: 2020-08-11

## 2020-06-15 RX ORDER — CETIRIZINE HYDROCHLORIDE 10 MG/1
10 TABLET ORAL 2 TIMES DAILY PRN
COMMUNITY

## 2020-06-15 RX ORDER — METOPROLOL TARTRATE 50 MG/1
50 TABLET, FILM COATED ORAL 2 TIMES DAILY
COMMUNITY

## 2020-06-15 RX ORDER — GLIPIZIDE 5 MG/1
10 TABLET ORAL
COMMUNITY

## 2020-06-15 ASSESSMENT — ENCOUNTER SYMPTOMS
BACK PAIN: 0
NAUSEA: 0
VOMITING: 0
TROUBLE SWALLOWING: 0
DIARRHEA: 0
ABDOMINAL PAIN: 0
SINUS PAIN: 0
COLOR CHANGE: 0
VOICE CHANGE: 0
FACIAL SWELLING: 0
STRIDOR: 0
EYE DISCHARGE: 0
WHEEZING: 0
EYE PAIN: 0
CHEST TIGHTNESS: 0
SHORTNESS OF BREATH: 0
RECTAL PAIN: 0
CONSTIPATION: 0
EYE ITCHING: 0

## 2020-06-18 ENCOUNTER — HOSPITAL ENCOUNTER (OUTPATIENT)
Dept: NUCLEAR MEDICINE | Age: 50
Discharge: HOME | End: 2020-06-20
Payer: COMMERCIAL

## 2020-06-18 LAB
GLUCOSE BLD-MCNC: 138 MG/DL (ref 70–99)
PERFORMED ON: ABNORMAL

## 2020-06-18 PROCEDURE — 78815 PET IMAGE W/CT SKULL-THIGH: CPT

## 2020-06-18 PROCEDURE — 82947 ASSAY GLUCOSE BLOOD QUANT: CPT

## 2020-06-18 PROCEDURE — 3430000000 HC RX DIAGNOSTIC RADIOPHARMACEUTICAL: Performed by: INTERNAL MEDICINE

## 2020-06-18 PROCEDURE — A9552 F18 FDG: HCPCS | Performed by: INTERNAL MEDICINE

## 2020-06-18 RX ORDER — FLUDEOXYGLUCOSE F 18 200 MCI/ML
10 INJECTION, SOLUTION INTRAVENOUS
Status: COMPLETED | OUTPATIENT
Start: 2020-06-18 | End: 2020-06-18

## 2020-06-18 RX ADMIN — FLUDEOXYGLUCOSE F 18 10 MILLICURIE: 200 INJECTION, SOLUTION INTRAVENOUS at 09:38

## 2020-06-19 ENCOUNTER — TELEPHONE (OUTPATIENT)
Dept: INFUSION THERAPY | Age: 50
End: 2020-06-19

## 2020-06-30 ENCOUNTER — OFFICE VISIT (OUTPATIENT)
Dept: CARDIOTHORACIC SURGERY | Age: 50
End: 2020-06-30
Payer: COMMERCIAL

## 2020-06-30 VITALS
DIASTOLIC BLOOD PRESSURE: 80 MMHG | WEIGHT: 202 LBS | OXYGEN SATURATION: 99 % | BODY MASS INDEX: 33.66 KG/M2 | HEART RATE: 108 BPM | HEIGHT: 65 IN | SYSTOLIC BLOOD PRESSURE: 131 MMHG | RESPIRATION RATE: 18 BRPM

## 2020-06-30 PROCEDURE — 99204 OFFICE O/P NEW MOD 45 MIN: CPT | Performed by: THORACIC SURGERY (CARDIOTHORACIC VASCULAR SURGERY)

## 2020-06-30 ASSESSMENT — ENCOUNTER SYMPTOMS
ABDOMINAL PAIN: 0
SINUS PAIN: 0
RECTAL PAIN: 0
FACIAL SWELLING: 0
EYE PAIN: 0
CHEST TIGHTNESS: 0
SHORTNESS OF BREATH: 0
VOMITING: 0
STRIDOR: 0
WHEEZING: 0
TROUBLE SWALLOWING: 0
EYE DISCHARGE: 0
EYE ITCHING: 0
NAUSEA: 0
DIARRHEA: 0
CONSTIPATION: 0
VOICE CHANGE: 0
BACK PAIN: 0
COLOR CHANGE: 0

## 2020-06-30 NOTE — PROGRESS NOTES
Patient:  Sachi Virgen  YOB: 1970  Date of Service: 7/1/2020  MRN: [id number]    Primary Care Physician: Jose Jackson    Chief Complaint   Patient presents with    Follow-up     History of non-Hodgkin's lymphoma       Patient Seen, Chart, Consults notes, Labs, Radiology studies reviewed. Subjective:  Aleida Lombardi is a 48year-old  female managed with primary and secondary diagnoses as outlined:  · Stage IIA NS Hodgkin's disease in 1992. 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 Co-managed with Dr. Rosana Gunderson  · Tumor screening and health maintenance  · Right base of tongue nonspecific  abnormality noted on PET scan at [facility] 1/3/2019, evaluated at ENT at  [facility] by Dr. Yisel Bear     Since her last visit, Aleida Lombardi has been evaluated by Dr. Emory Sheppard with the ENT department at [facility], by Dr. Vandana Tam with thoracic surgery department at [facility]. She has had imaging studies with a PET scan on 6/18/2020. 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 Full serological workup was done including a CMP, LDH, B2M, serum protein electrophoresis and quantitative immunoglobulins, all which were normal. A bone marrow biopsy and aspirate on 05/17/10 was performed and was virtually normal except for low iron stores.  Cytogenetics were normal. Hematologically this is consistent with a post splenectomy leukocytosis and will subsequently be monitored conservatively. 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 Florentino Stewart presented  in her seventh month of her first pregnancy in the early part of 1992 with a palpable  right supraclavicular lymph node. After delivery on 05/11/92 she sought attention for this. A right supraclavicular lymph node biopsy on 06/11/92   Pathology revealed a nodular sclerosing Hodgkins disease. Disease involved that area as well as the mediastinum. A bone marrow biopsy and aspirate did not show any evidence of metastatic disease. A staging laparotomy on 7/6/1992 that included lymph nodes biopsies and a splenectomy did not reveal any infradiaphragmatic malignant pathology. There were miliary granulomatous lesions noted throughout the spleen and on the surface of the liver, but no evidence of Hodgkins. She was referred to radiation therapy and was cared for by Dr. Clau Trejo. [facility] in [place], [place]. He delivered approximately 3,960 cGy with a boost to the right neck. Her radiation therapy was in the mantle and periaortic lymph node fashion.      She has been monitored conservatively since that time    TREATMENT SUMMARY:  1. XRT to the right neck, as noted above       SECONDARY TUMOR MONITORING AND SCREENING:  #1  Subcentimeter pulmonary nodules Negative for dysuria, flank pain and hematuria. Musculoskeletal: Negative for back pain, gait problem, neck pain and neck stiffness. Skin: Negative for color change, pallor, rash and wound. Allergic/Immunologic: Negative for immunocompromised state. Neurological: Negative for dizziness, speech difficulty, weakness, light-headedness and headaches. Hematological: Does not bruise/bleed easily. Psychiatric/Behavioral: Negative for confusion. The patient is not nervous/anxious. Objective:  Vital Signs: Blood pressure 136/78, pulse 95, temperature 98 °F (36.7 °C), height 5' 5\" (1.651 m), weight 201 lb 1.6 oz (91.2 kg), SpO2 98 %. Physical Exam  Constitutional:       Appearance: Normal appearance. She is normal weight. HENT:      Head: Normocephalic and atraumatic. Right Ear: External ear normal.      Left Ear: External ear normal.      Nose: Nose normal.      Mouth/Throat:      Mouth: Mucous membranes are moist.      Pharynx: Oropharynx is clear. Eyes:      General: No scleral icterus. Extraocular Movements: Extraocular movements intact. Conjunctiva/sclera: Conjunctivae normal.   Neck:      Musculoskeletal: Normal range of motion and neck supple. Cardiovascular:      Rate and Rhythm: Normal rate and regular rhythm. Pulses: Normal pulses. Heart sounds: Normal heart sounds. Pulmonary:      Effort: Pulmonary effort is normal.      Breath sounds: Normal breath sounds. No stridor. No wheezing or rales. Abdominal:      General: Abdomen is flat. Bowel sounds are normal. There is no distension. Palpations: Abdomen is soft. Musculoskeletal: Normal range of motion. General: No swelling or tenderness. Right lower leg: No edema. Left lower leg: No edema. Skin:     General: Skin is warm and dry. Findings: No lesion or rash. Neurological:      General: No focal deficit present.       Mental Status: She is alert and oriented to person, place, and time. Mental status is at baseline. Motor: No weakness. Psychiatric:         Mood and Affect: Mood normal.         Labs:  BMP: No results for input(s): NA, K, CL, CO2, PHOS, BUN, CREATININE, CALCIUM in the last 72 hours. CBC:   Recent Labs     07/01/20  1235   WBC 19.23*   HGB 13.7   HCT 42.4   MCV 94.9*        LIVER PROFILE: No results for input(s): AST, ALT, LIPASE, BILIDIR, BILITOT, ALKPHOS in the last 72 hours. Invalid input(s): AMYLASE,  ALB  PT/INR: No results for input(s): PROTIME, INR in the last 72 hours. APTT: No results for input(s): APTT in the last 72 hours. BNP:  No results for input(s): BNP in the last 72 hours. Ionized Calcium:No results for input(s): IONCA in the last 72 hours. Magnesium:No results for input(s): MG in the last 72 hours. Phosphorus:No results for input(s): PHOS in the last 72 hours. HgbA1C: No results for input(s): LABA1C in the last 72 hours. Hepatic: No results for input(s): ALKPHOS, ALT, AST, PROT, BILITOT, BILIDIR, LABALBU in the last 72 hours. Lactic Acid: No results for input(s): LACTA in the last 72 hours. Troponin: No results for input(s): CKTOTAL, CKMB, TROPONINT in the last 72 hours. ABGs: No results for input(s): PH, PCO2, PO2, HCO3, O2SAT in the last 72 hours. CRP:  No results for input(s): CRP in the last 72 hours. Sed Rate:  No results for input(s): SEDRATE in the last 72 hours. Cultures:   No results for input(s): CULTURE in the last 72 hours. 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 Technologist obtained history includes shortness of breath and pain. COMPARISON: 5/26/2017, 1/10/2019 (from [facility]) DLP: 500 mGy cm. Automated exposure control was utilized to minimize patient radiation dose.  TECHNIQUE: Enhanced  CT images of the chest obtained with multiplanar reformats. FINDINGS: AIRWAYS/PULMONARY PARENCHYMA: The central airways are midline and patent. Right lower lobe with a 6 mm and 4 mm solid pulmonary nodule on axial series 2, image 56. These are stable compared to 1/10/2019 and 5/26/2017. Minimal biapical scarring. No pleural effusion. No pneumothorax. VASCULATURE: Thoracic aorta is normal in course and caliber. Mild calcified aortic atherosclerosis. Normal pulmonary artery caliber. No large central pulmonary artery filling defect on this non-CTA exam. CARDIAC:  Cardiac size is normal.  No pericardial effusion. No gross coronary calcifications. MEDIASTINUM: There is no mediastinal or hilar lymphadenopathy by CT size criteria. Esophagus has normal coarse, caliber and wall thickness. EXTRATHORACIC: The visualized portions of the thyroid gland are unremarkable. No thoracic inlet or axillary adenopathy. The extrathoracic soft tissues are within normal limits. INCLUDED UPPER ABDOMEN: Phase of contrast limits evaluation, but suspect fatty infiltration of the liver. Adrenal glands and pancreatic tail within normal limits. The spleen is absent and there are nodular soft tissue densities in the splenic bed, likely splenosis. These are stable compared to 5/26/2017. OSSEOUS: Stable sclerotic lesion in the C7 vertebral body compared to 5/26/2017. No acute or suspicious bony finding. 1. Stable right lower lobe pulmonary nodules measuring up to 6 mm compared to 5/26/2017. No additional follow-up recommended. 2. Suspect fatty infiltration of the liver, but evaluation is limited due to phase of contrast. 3. No lymphadenopathy. Signed by Dr Haris Schultz on 7/17/2019 1:56 PM       ASSESSMENT AND PLAN:    #1  Marc Costa is a 48year-old  female managed with primary and secondary diagnoses as outlined:  · Stage IIA NS Hodgkin's disease in 1992.    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 I believe this is reasonable. Additionally, if the needle biopsy of the parotid or if in ENT evaluation at [facility], she is found to have a lymphoproliferative disorder that would require systemic chemotherapy, then that should as well potentially address the abnormality noted in the chest.  I will follow this closely along with Dr. Lanny Haskins as well. #3  Abnormal Pap smear     She is followed at the [place] [id number] group in [facility] by nurse practitioners aCrly Garcia and Blanca Waite and she reports that a biopsy that was performed of her either cervix or uterus was performed with follow-up ultrasounds planned. #4.   Breast cancer tumor screening    She apparently had an abnormal mammogram of the right breast and is also under evaluation by Dr. Estrella Heaton in [facility]. This is where she has her routine breast cancer screening and will continue according to Aide Contreras and her . #5.   Subcentimeter pulmonary nodules and new 1 cm superior segment left lower lobe nodule of the subpleural posterior medial surface with increased FDG uptake and SUV of 5.5, worrisome for malignancy. Co-managed with Dr. Francia Osborn  developed chest pain and pressure and presented to the [facility] in [place] with a CTA chest on 1/10/2019 revealing 2 noncalcified nodules of the RLL-largest 6 mm.     CT chest with contrast on 7/17/2019 documented:  · 6 mm RLL lung nodule, stable  · 4 mm RLL lung nodule, stable  · Suspect fatty infiltration of the liver, but evaluation is limited due to phase of contrast  · No lymphadenopathy      CT neck with [facility] 6/8/2020 [facility] documented:  · No acute abnormality    · Small cervical chain lymph nodes as well as small nodules in the parotid gland, favorable of recurrence given patient history of lymphoma. Recommend PET-CT. Otherwise, US-guided FNA or core biopsy of the parotid nodules could be considered. I spoke to Dr. Maria M Hannah with thoracic surgery department at [facility] today (7/1/2020). He saw Reinaldo Alfred on 6/30/2020 with an in-depth and comprehensive consultation that is part of the record. Due to the fact that she has previously been radiated in the chest, the proximity of this lymph node or nodule to the aorta and the significant morbidity that may be involved with obtaining a surgical tissue diagnosis, recommendation is to have a follow-up CT scan in 6 months and hold off on intervention until there is more evidence of obvious growth or progression that unequivocally would require such an intervention for diagnosis. I believe this is reasonable. Additionally, if the needle biopsy of the parotid or if in ENT evaluation at [facility], she is found to have a lymphoproliferative disorder that would require systemic chemotherapy, then that should as well potentially address the abnormality noted in the chest.  I will follow this closely along with Dr. Valerio Soto as well. #6  GI tumor screening    A colonoscopy was performed in [facility]  approximately a year ago with a 2-year follow-up recommended. Request will be made to obtain that report      ICamila am scribing for Danna Borjas MD. Electronically signed by Redmond Kussmaul, LPN on 0/2/3917 at 9:60 PM       There are no diagnoses linked to this encounter. No orders of the defined types were placed in this encounter. No orders of the defined types were placed in this encounter. Return in about 1 month (around 8/1/2020).        I, Dr. Kelsey Nazario, personally performed the services described in this documentation as scribed by Redmond Kussmaul LPN in my presence, and it

## 2020-07-01 ENCOUNTER — HOSPITAL ENCOUNTER (OUTPATIENT)
Dept: INFUSION THERAPY | Age: 50
Discharge: HOME | End: 2020-07-01
Payer: COMMERCIAL

## 2020-07-01 ENCOUNTER — OFFICE VISIT (OUTPATIENT)
Dept: HEMATOLOGY | Age: 50
End: 2020-07-01
Payer: COMMERCIAL

## 2020-07-01 VITALS
DIASTOLIC BLOOD PRESSURE: 78 MMHG | SYSTOLIC BLOOD PRESSURE: 136 MMHG | HEART RATE: 95 BPM | OXYGEN SATURATION: 98 % | HEIGHT: 65 IN | WEIGHT: 201.1 LBS | TEMPERATURE: 98 F | BODY MASS INDEX: 33.51 KG/M2

## 2020-07-01 DIAGNOSIS — C85.90 NON-HODGKIN'S LYMPHOMA, UNSPECIFIED BODY REGION, UNSPECIFIED NON-HODGKIN LYMPHOMA TYPE (HCC): ICD-10-CM

## 2020-07-01 LAB
BASOPHILS ABSOLUTE: 0.04 K/UL (ref 0.01–0.08)
BASOPHILS RELATIVE PERCENT: 0.2 % (ref 0.1–1.2)
EOSINOPHILS ABSOLUTE: 0.62 K/UL (ref 0.04–0.54)
EOSINOPHILS RELATIVE PERCENT: 3.2 % (ref 0.7–7)
HCT VFR BLD CALC: 42.4 % (ref 34.1–44.9)
HEMOGLOBIN: 13.7 G/DL (ref 11.2–15.7)
LYMPHOCYTES ABSOLUTE: 4.06 K/UL (ref 1.18–3.74)
LYMPHOCYTES RELATIVE PERCENT: 21.1 % (ref 19.3–53.1)
MCH RBC QN AUTO: 30.6 PG (ref 25.6–32.2)
MCHC RBC AUTO-ENTMCNC: 32.3 G/DL (ref 32.3–35.5)
MCV RBC AUTO: 94.9 FL (ref 79.4–94.8)
MONOCYTES ABSOLUTE: 3.08 K/UL (ref 0.24–0.82)
MONOCYTES RELATIVE PERCENT: 16 % (ref 4.7–12.5)
NEUTROPHILS ABSOLUTE: 11.43 K/UL (ref 1.56–6.13)
NEUTROPHILS RELATIVE PERCENT: 59.5 % (ref 34–71.1)
PDW BLD-RTO: 15.9 % (ref 11.7–14.4)
PLATELET # BLD: 286 K/UL (ref 182–369)
PMV BLD AUTO: 10.8 FL (ref 7.4–10.4)
RBC # BLD: 4.47 M/UL (ref 3.93–5.22)
WBC # BLD: 19.23 K/UL (ref 3.98–10.04)

## 2020-07-01 PROCEDURE — 99214 OFFICE O/P EST MOD 30 MIN: CPT | Performed by: INTERNAL MEDICINE

## 2020-07-01 PROCEDURE — 99211 OFF/OP EST MAY X REQ PHY/QHP: CPT

## 2020-07-01 PROCEDURE — 85025 COMPLETE CBC W/AUTO DIFF WBC: CPT

## 2020-07-01 NOTE — PROGRESS NOTES
[address] [facility] Cardiothoracic Surgery  [address], [address], [facility], [place] [address]  Phone: [phone]  Fax: [phone]    Name: Faustino Peter  : 1970    DATE: 2020        The patient is a pleasant 70-year-old female who has a past history of stage IIa nodular sclerosing Hodgkin's lymphoma diagnosed in . At that time she had a right supraclavicular lymph node that was enlarged this was biopsied which made the diagnosis and apparently she also had some intrathoracic disease she underwent mantle radiation therapy as well as a staging laparotomy with splenectomy. She appeared to have a curative response and has been disease-free for the past 28 years. Since that time she has had some issues with occasional leukocytosis felt secondary to splenectomy she is also had miliary granulomatous disease seen on chest CT scans that has  been stable she had a PET CT scan performed in  that demonstrated some enhancement along the base of the tongue she underwent ENT evaluation at [facility] [place] and nothing pathologic was seen. A chest CT scan performed in 2019 did not demonstrate any significant intrathoracic disease. A follow-up CT scan on 2019 also did not demonstrate any new findings. Several months ago she began developing right-sided jaw pain the next CT scan was performed on 2020 that demonstrated small cervical lymph nodes surrounding the parotid gland and this was felt to be a possible area of recurrence of her lymphoma. A body PET CT scan was then performed which demonstrated enhancement in the right parotid and cervical area in addition to a small spot seen in the left chest adjacent to the aorta that was less than 1 cm in size and fairly non-distinct. There is also some mild avidity in inguinal adenopathy bilaterally. A right neck parotid gland biopsy has been scheduled to be performed next week. Because of the small area in the left chest I was asked to evaluate the patient for further management suggestions regarding this lesion.   Other than her right-sided neck and jaw pain she has been otherwise asymptomatic she denies any adenopathy in the groin or axillary area her weight is been stable and her activity has been stable as well    Past Medical History:   Diagnosis Date    Anxiety     Carpal tunnel syndrome of right wrist 10/5/2017    Depression     Diabetes ([address]     borderline in the past    GERD (gastroesophageal reflux disease)     Hodgkin's lymphoma ([address] 24 years ago    Tx with XRT    Hyperprolactinemia ([address]     Hypothyroidism     Multiple thyroid nodules     Nonalcoholic fatty liver disease     Tachycardia     S/P cardiac ablation 4/2016     Past Surgical History:   Procedure Laterality Date    ANKLE SURGERY      3 screws    BONE MARROW BIOPSY N/A 11/29/2017    BONE MARROW ASPIRATION BIOPSY performed by Katarina Pineda MD at [address] [place] [address]      [facility] of [facility]    WI REVISE MEDIAN N/CARPAL TUNNEL SURG Right 10/5/2017    CARPAL TUNNEL RELEASE performed by Dylan Rosario DO at [facility] OR    SPLENECTOMY      TONSILLECTOMY      TUBAL LIGATION      TYMPANOSTOMY TUBE PLACEMENT       Current Outpatient Medications   Medication Sig Dispense Refill    metoprolol tartrate (LOPRESSOR) 50 MG tablet Take 50 mg by mouth 2 times daily      glipiZIDE (GLUCOTROL) 5 MG tablet Take 5 mg by mouth 2 times daily (before meals)      hydroxychloroquine (PLAQUENIL) 200 MG tablet Take 200 mg by mouth daily      albuterol sulfate HFA (PROAIR HFA) 108 (90 Base) MCG/ACT inhaler Inhale 2 puffs into the lungs every 6 hours as needed for Wheezing      Biotin 29743 MCG TBDP Take by mouth      hydrOXYzine (ATARAX) 25 MG tablet Take 25 mg by mouth every 8 hours as needed for Itching      cetirizine partner: Not on file     Emotionally abused: Not on file     Physically abused: Not on file     Forced sexual activity: Not on file   Other Topics Concern    Not on file   Social History Narrative    Not on file         ROS:   HEENT: denies neck pain, sore throat, blurred vision, diplopia. Respiratory: Denies shortness of breath. Denies any hemoptysis or wheezes. Cardiovascular: denies angina, palpitations, lower extremity edema. GI: denies abdominal pain, nausea, vomiting, constipation. : denies urinary frequency, or dysuria. Musculoskeletal: No joint pain or swelling. Neurologic: denies diplopia, headache, or ataxia. The other 14 systems have been reviewed and are negative    Physical Exam: /80 (Site: Right Upper Arm, Position: Sitting, Cuff Size: Medium Adult)   Pulse 108   Resp 18   Ht 5' 5\" (1.651 m)   Wt 202 lb (91.6 kg)   SpO2 99%   BMI 33.61 kg/m²   Pleasant middle-aged lady who appears fairly comfortable no pain or distress at this time  ENT: Throat is clear., Mucosa clear., Septum intact., No pyorrhea or abscess. ,  There is a well-healed right supraclavicular incision  Neck: Neck is soft., No cervical masses. , Throat is clear., Neck veins are not distended. , Thyroid is normal size. Respiratory: Clear lung sounds., Normal., No wheezes or rhonchi., No use of accessory muscles. Cardiovascular: Cardiac sounds are clear., Regular rate and rhythm., No murmur., No carotid bruits. , Peripheral pulses are intact., Extremities exhibit no edema or varicosities. Abdomen: Abdomen is soft., No masses or tenderness. , Liver and spleen not palpable., Normal bowel sounds.,  Midline abdominal incisional scars noted  Lymphatic: No lymphadenopathy in the cervical, axillary, or femoral areas. Musculoskeletal: No clubbing or cyanosis. , Normal muscle tone., Normal range of motion upper and lower extremities. , No joint inflammation or swelling.   Skin: No rashes, lesions, or ulcers., No swelling or edema. Neurologic exam: No lateralizing neurologic findings. , Cranial nerves II-XII are normal., Examination of sensation is normal.  Psychiatric: Patient exhibits normal judgement and is oriented to name, time, and place., No anxiety or depression. This pleasant 77-year-old female has a diagnosis of stage II nodular sclerosing non-Hodgkin's lymphoma successfully diagnosed and treated in 1992. Surveillance studies over the past 28 years have demonstrated no evidence of recurrent disease until the recent PET CT scan suggesting some avidity in the right parotid and cervical area. This area is currently symptomatic. Incidental finding on the PET scan suggests a small tiny subcentimeter infiltrate in the right pleural space adjacent to the descending thoracic aorta. I do not actually see a lung lesion in this area and it may represent a lymph node. This is not amenable to a transthoracic needle biopsy. Unfortunately, access to this area surgically would involve access to her thoracotomy to locate this tiny area between the long and ascending thoracic aorta would be quite challenging. This of course would be a major undertaking to find such a tiny nodule that may represent a small lymph node. Recommendation is for her to undergo the right neck parotid biopsy as scheduled. If it does show recurrent lymphoma she will require chemotherapy anyway. If it is nondiagnostic I would recommend a repeat chest CT scan in 6 months to see if the periaortic thoracic infiltrate shows any signs of enlargement. At this point if diagnosis is needed further then  we would have no choice then but  to proceed with an  exploratory thoracotomy. This plan and its rationale were discussed at length with the patient and she is agreeable.      Electronically signed by Cheryle Churn, MD on 7/1/2020 at 9:24 AM

## 2020-08-07 NOTE — PROGRESS NOTES
conservatively. Bone marrow 11/29/2017  Normocellular bone marrow for age (~50-60%) with trilineage hematopoiesis, no significant dyspoiesis and ~1-2% blasts   Polyclonal plasmacytosis (~68% on  stain)   No stainable storage iron present; no increase in reticulin fibrosis   Negative FISH STUDY for BCR/ABL translocation   Negative molecular study for MPN profile including JAK2 [id number] point mutation   Normal female karyotype   No evidence of involvement of Hodgkin's lymphoma       TUMOR HISTORY: Stage IIA nodular sclerosing Hodgkins lymphoma diagnosed 06/11/92  Marlene was seen in initial oncology consultation on 6/26/1992 with a diagnosis of right supraclavicular nodular sclerosing Hodgkin's lymphoma for recommendations. Marlene presented  in her seventh month of her first pregnancy in the early part of 1992 with a palpable  right supraclavicular lymph node. After delivery on 05/11/92 she sought attention for this. A right supraclavicular lymph node biopsy on 06/11/92   Pathology revealed a nodular sclerosing Hodgkins disease. Disease involved that area as well as the mediastinum. A bone marrow biopsy and aspirate did not show any evidence of metastatic disease. A staging laparotomy on 7/6/1992 that included lymph nodes biopsies and a splenectomy did not reveal any infradiaphragmatic malignant pathology. There were miliary granulomatous lesions noted throughout the spleen and on the surface of the liver, but no evidence of Hodgkins. She was referred to radiation therapy and was cared for by Dr. Nina Delgado. [facility] in [place], [place]. He delivered approximately 3,960 cGy with a boost to the right neck. Her radiation therapy was in the mantle and periaortic lymph node fashion.      She has been monitored conservatively since that time    TREATMENT SUMMARY:  1. XRT to the right neck, as noted above       SECONDARY TUMOR MONITORING AND SCREENING:    #1  Subcentimeter pulmonary nodules department at [facility] on 7/1/2020. He saw Aleida Lombardi on 6/30/2020 with an in-depth and comprehensive consultation that is part of the record. Due to the fact that she has previously been radiated in the chest, the proximity of this lymph node or nodule to the aorta and the significant morbidity that may be involved with obtaining a surgical tissue diagnosis, recommendation is to have a follow-up CT scan in 6 months and hold off on intervention until there is more evidence of obvious growth or progression that unequivocally would require such an intervention for diagnosis. I believe this is reasonable. Additionally, if the needle biopsy of the parotid or if in ENT evaluation at [facility], she is found to have a lymphoproliferative disorder that would require systemic chemotherapy, then that should as well potentially address the abnormality noted in the chest.  I will follow this closely along with Dr. Rosana Gunderson as well. A follow-up CT scan of the chest will be performed in 1 ~December 2020 continued monitoring    #2  Right base of tongue nonspecific abnormality noted on PET scan at [facility] 1/3/2019, monitored by Dr. Yisel Bear, ENT at  [facility]  S/P right complete parotidectomy by Dr. Yisel Bear at [facility] on 7/24/2020 for the 1.7 cm right parotid mass    She saw Dr. Danial Sprague and reports that a PET scan on 1/3/2019 at [facility] identifying an abnormality at the base of the tongue-throat area. Dr. Danila Sprague referred her to Dr. Yisel Bear - ENT at [facility] and she was seen in January of 2019 with a scope that did find a small abnormality in the right tongue region but this was too small to biopsy with a follow-up arrangement made for March 2019. She did not have a follow-up with Dr. Danial Sprague.     CT neck with contrast on 6/8/2020 at [facility] documented:  · No acute abnormality    · Small cervical chain lymph nodes as well as small nodules in the parotid gland, favorable of recurrence given patient history of lymphoma. Recommend PET-CT. Otherwise, US-guided FNA or core biopsy of the parotid nodules could be considered. Examination today (6/15/2020) does not reveal evidence of palpable peripheral lymphadenopathy in the head and neck area. Inspection and digital palpation of the base of tongue with extraction of the tongue does not disclose nodules or masses in the base of tongue or in the head and neck area that I can appreciate on exam.    Billy Atkins was evaluated by Dr. Benji Dee on 6/16/2020  for further ENT evaluation. PET scan on 6/18/2020 documented:  · Mild asymmetrical uptake within the right posterior base of tongue along the left inguinal salivary gland with MAX SUV 4  · Mild FDG uptake within the intraparotid lymph nodes max SUV of 2.6 and the right and left intraparotid nodes  · Subcentimeter right posterior triangle lymph node has an SUV of 1.8  · New 1 cm superior segment left lower lobe nodule of the subpleural posterior medial surface with increased FDG uptake and SUV of 5.5, worrisome for malignancy. Lymphoma versus primary lung neoplasm considered. Given the small size and the positioning along the lateral margin of the descending thoracic aorta, percutaneous biopsy would be extremely difficult  · Stable borderline prominent size bilateral inguinal lymph nodes have mild FDG uptake with a maximum SUV of 2.5. · Right external iliac node has a maximum SUV of 2.5     US-guided FNA of the 1.7 cm right parotid mass was performed on 7/9/2020 at [address] [place] [address]. Pathology revealed:  Parotid, right, FNA:   · Histiocytes and lymphocytes; negative for malignancy     Right complete parotidectomy was performed on 7/24/2020 by Dr. Benji Dee at [address] [place] [address].    Pathology revealed:  · Four lymph nodes with reactive follicular hyperplasia  · Findings consistent with a reactive process  · Increased reactive follicles with germinal centers throughout the lymph node  · The paracortical expansion with focal increase in 3 screws    BONE MARROW BIOPSY N/A 11/29/2017    BONE MARROW ASPIRATION BIOPSY performed by Shae Flores MD at [address] COLONOSCOPY      OTHER SURGICAL HISTORY      exploritory surgery for ganesh right side of neck    WA REVISE MEDIAN N/CARPAL TUNNEL SURG Right 10/5/2017    CARPAL TUNNEL RELEASE performed by Mattie Weaver DO at [address] [facility] OR    SPLENECTOMY      TONSILLECTOMY      TUBAL LIGATION      TYMPANOSTOMY TUBE PLACEMENT          Family History:      Problem Relation Age of Onset    Breast Cancer Maternal Grandmother         Social History  Social History     Tobacco Use    Smoking status: Never Smoker    Smokeless tobacco: Never Used   Substance Use Topics    Alcohol use: Yes     Comment: occ    Drug use: No          Review of Systems   Constitutional: Negative for appetite change, fatigue, fever and unexpected weight change. HENT: Negative for ear pain, facial swelling, hearing loss, sinus pain, trouble swallowing and voice change. Eyes: Negative for pain, discharge, itching and visual disturbance. Respiratory: Negative for chest tightness, shortness of breath, wheezing and stridor. Cardiovascular: Negative for chest pain, palpitations and leg swelling. Gastrointestinal: Negative for abdominal pain, constipation, diarrhea, nausea, rectal pain and vomiting. Endocrine: Negative for polydipsia, polyphagia and polyuria. Genitourinary: Negative for dysuria, flank pain and hematuria. Musculoskeletal: Negative for back pain, gait problem, neck pain and neck stiffness. Skin: Negative for color change, pallor, rash and wound. Allergic/Immunologic: Negative for immunocompromised state. Neurological: Negative for dizziness, speech difficulty, weakness, light-headedness and headaches. Hematological: Does not bruise/bleed easily. Psychiatric/Behavioral: Negative for confusion. The patient is not nervous/anxious.           Objective:  Vital Signs: Blood pressure 130/74, pulse 95, temperature 97.7 °F (36.5 °C), height 5' 5\" (1.651 m), weight 200 lb 12.8 oz (91.1 kg), SpO2 96 %. Physical Exam  Constitutional:       Appearance: Normal appearance. She is normal weight. HENT:      Head: Normocephalic and atraumatic. Right Ear: External ear normal.      Left Ear: External ear normal.      Nose: Nose normal.      Mouth/Throat:      Mouth: Mucous membranes are moist.      Pharynx: Oropharynx is clear. Eyes:      General: No scleral icterus. Extraocular Movements: Extraocular movements intact. Conjunctiva/sclera: Conjunctivae normal.   Neck:      Musculoskeletal: Normal range of motion and neck supple. Cardiovascular:      Rate and Rhythm: Normal rate and regular rhythm. Pulses: Normal pulses. Heart sounds: Normal heart sounds. Pulmonary:      Effort: Pulmonary effort is normal.      Breath sounds: Normal breath sounds. No stridor. No wheezing or rales. Abdominal:      General: Abdomen is flat. Bowel sounds are normal. There is no distension. Palpations: Abdomen is soft. Musculoskeletal: Normal range of motion. General: No swelling or tenderness. Right lower leg: No edema. Left lower leg: No edema. Skin:     General: Skin is warm and dry. Findings: No lesion or rash. Neurological:      General: No focal deficit present. Mental Status: She is alert and oriented to person, place, and time. Mental status is at baseline. Motor: No weakness. Psychiatric:         Mood and Affect: Mood normal.         Labs:  BMP: No results for input(s): NA, K, CL, CO2, PHOS, BUN, CREATININE, CALCIUM in the last 72 hours. CBC:   Recent Labs     08/11/20  1006   WBC 14.40*   HGB 12.4   HCT 39.5   MCV 97.8*   *     LIVER PROFILE: No results for input(s): AST, ALT, LIPASE, BILIDIR, BILITOT, ALKPHOS in the last 72 hours. Invalid input(s):   AMYLASE,  ALB  PT/INR: No results for input(s): PROTIME, INR in the last 72 hours. APTT: No results for input(s): APTT in the last 72 hours. BNP:  No results for input(s): BNP in the last 72 hours. Ionized Calcium:No results for input(s): IONCA in the last 72 hours. Magnesium:No results for input(s): MG in the last 72 hours. Phosphorus:No results for input(s): PHOS in the last 72 hours. HgbA1C: No results for input(s): LABA1C in the last 72 hours. Hepatic: No results for input(s): ALKPHOS, ALT, AST, PROT, BILITOT, BILIDIR, LABALBU in the last 72 hours. Lactic Acid: No results for input(s): LACTA in the last 72 hours. Troponin: No results for input(s): CKTOTAL, CKMB, TROPONINT in the last 72 hours. ABGs: No results for input(s): PH, PCO2, PO2, HCO3, O2SAT in the last 72 hours. CRP:  No results for input(s): CRP in the last 72 hours. Sed Rate:  No results for input(s): SEDRATE in the last 72 hours. Cultures:   No results for input(s): CULTURE in the last 72 hours. 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 Technologist obtained history includes shortness of breath and pain. COMPARISON: 5/26/2017, 1/10/2019 (from [facility]) DLP: 500 mGy cm. Automated exposure control was utilized to minimize patient radiation dose. TECHNIQUE: Enhanced  CT images of the chest obtained with multiplanar reformats. FINDINGS: AIRWAYS/PULMONARY PARENCHYMA: The central airways are midline and patent. Right lower lobe with a 6 mm and 4 mm solid pulmonary nodule on axial series 2, image 56. These are stable compared to 1/10/2019 and 5/26/2017. Minimal biapical scarring. No pleural effusion. No pneumothorax. VASCULATURE: Thoracic aorta is normal in course and caliber. Mild calcified aortic atherosclerosis. Normal pulmonary artery caliber.  No large central pulmonary artery filling defect on this non-CTA exam. CARDIAC:  Cardiac size is normal.  No pericardial effusion. No gross coronary calcifications. MEDIASTINUM: There is no mediastinal or hilar lymphadenopathy by CT size criteria. Esophagus has normal coarse, caliber and wall thickness. EXTRATHORACIC: The visualized portions of the thyroid gland are unremarkable. No thoracic inlet or axillary adenopathy. The extrathoracic soft tissues are within normal limits. INCLUDED UPPER ABDOMEN: Phase of contrast limits evaluation, but suspect fatty infiltration of the liver. Adrenal glands and pancreatic tail within normal limits. The spleen is absent and there are nodular soft tissue densities in the splenic bed, likely splenosis. These are stable compared to 5/26/2017. OSSEOUS: Stable sclerotic lesion in the C7 vertebral body compared to 5/26/2017. No acute or suspicious bony finding. 1. Stable right lower lobe pulmonary nodules measuring up to 6 mm compared to 5/26/2017. No additional follow-up recommended. 2. Suspect fatty infiltration of the liver, but evaluation is limited due to phase of contrast. 3. No lymphadenopathy. Signed by Dr Alireza Greco on 7/17/2019 1:56 PM       ASSESSMENT AND PLAN:    #1  Carolyn Benitez is a 48year-old  female managed with primary and secondary diagnoses as outlined:  · Stage IIA NS Hodgkin's disease in 1992. 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   Co-managed with Dr. Isabell Rodriguez  · Tumor screening and health maintenance  · Right base of tongue nonspecific abnormality noted on PET scan at [facility] 1/3/2019, monitored by Dr. Lynne Fitch, ENT at  [facility]  · S/P right complete parotidectomy by Dr. Lynne Fitch at [place] on 7/24/2020 for the 1.7 cm right parotid mass    Addie underwent a right complete parotidectomy by Dr. Toan Corey at [facility] today to review results and further recommendations regarding the multiplicity of other issues we are following. Physical examination does not reveal evidence of palpable peripheral lymphadenopathy in the head and neck area, axilla supra or infraclavicular areas nor inguinal areas. The right total parotidectomy scar is barely visible in the preauricular area with excellent healing of the scar behind the right ear  Abdominal exam is without abdominal organomegaly or masses. Extremities no cyanosis clubbing or edema    CBC 7/1/2020 revealed a WBC of 19.23 with a hemoglobin of 13.7 and a platelet count of 015,250. CBC today (8/11/2020) reveals a WBC of 14.40. Hgb is 12.4 with an MCV of 97.8 and platelet count of 703,597. Continue conservative monitoring only necessary for this issue, please see segments below. #2.  Right base of tongue nonspecific abnormality noted on PET scan at [facility] 1/3/2019, monitored by Dr. Toan Corey, ENT at  [facility]  S/P right complete parotidectomy by Dr. Toan Corey at [facility] on 7/24/2020 for the 1.7 cm right parotid mass    She saw Dr. Mine Villarreal and reports that a PET scan on 1/3/2019 at [facility] identifying an abnormality at the base of the tongue-throat area. Dr. Mine Villarreal referred her to Dr. Toan Corey - ENT at [facility] and she was seen in January of 2019 with a scope that did find a small abnormality in the right tongue region but this was too small to biopsy with a follow-up arrangement made for March 2019. She did not have a follow-up with Dr. Mine Villarreal. CT neck with contrast on 6/8/2020 at [facility] documented:  · No acute abnormality    · Small cervical chain lymph nodes as well as small nodules in the parotid gland, favorable of recurrence given patient history of lymphoma. Recommend PET-CT.  Otherwise, US-guided FNA or core biopsy of the parotid nodules could be considered. Examination today (6/15/2020) does not reveal evidence of palpable peripheral lymphadenopathy in the head and neck area. Inspection and digital palpation of the base of tongue with extraction of the tongue does not disclose nodules or masses in the base of tongue or in the head and neck area that I can appreciate on exam.    Marlene was evaluated by Dr. Duane Ramming on 6/16/2020  for further ENT evaluation. PET scan on 6/18/2020 documented:  · Mild asymmetrical uptake within the right posterior base of tongue along the left inguinal salivary gland with MAX SUV 4  · Mild FDG uptake within the intraparotid lymph nodes max SUV of 2.6 and the right and left intraparotid nodes  · Subcentimeter right posterior triangle lymph node has an SUV of 1.8  · New 1 cm superior segment left lower lobe nodule of the subpleural posterior medial surface with increased FDG uptake and SUV of 5.5, worrisome for malignancy. Lymphoma versus primary lung neoplasm considered. Given the small size and the positioning along the lateral margin of the descending thoracic aorta, percutaneous biopsy would be extremely difficult  · Stable borderline prominent size bilateral inguinal lymph nodes have mild FDG uptake with a maximum SUV of 2.5. · Right external iliac node has a maximum SUV of 2.5     US-guided FNA of the 1.7 cm right parotid mass was performed on 7/9/2020 at [address] [place] [address]. Pathology revealed:  Parotid, right, FNA:   · Histiocytes and lymphocytes; negative for malignancy     Right complete parotidectomy was performed on 7/24/2020 by Dr. Duane Ramming at [address] [place] [address]. Pathology revealed:  · Four lymph nodes with reactive follicular hyperplasia  · Findings consistent with a reactive process  · Increased reactive follicles with germinal centers throughout the lymph node  · The paracortical expansion with focal increase in histiocytes and Langerhans cells suggests mild dermatopathia.   · No evidence of the patient's previously diagnosed Hodgkin lymphoma or other hematolymphoid  process. Correlation with clinical and other laboratory findings is advised. Symptomatically, Dhiraj Tim is doing remarkably well postop. On examination, the right total parotidectomy scar is barely visible in the preauricular area with excellent healing of the scar behind the right ear    She is scheduled to follow-up at [facility] on 8/25/2020      #3. Subcentimeter pulmonary nodules and new 1 cm superior segment left lower lobe nodule of the subpleural posterior medial surface with increased FDG uptake and SUV of 5.5, worrisome for malignancy. Co-managed with Dr. Neville Gallo  developed chest pain and pressure and presented to the [facility] in [place] with a CTA chest on 1/10/2019 revealing 2 noncalcified nodules of the RLL-largest 6 mm. CT chest with contrast on 7/17/2019 documented:  · 6 mm RLL lung nodule, stable  · 4 mm RLL lung nodule, stable  · Suspect fatty infiltration of the liver, but evaluation is limited due to phase of contrast  · No lymphadenopathy      CT neck with contrast on 6/8/2020 at [facility] documented:  · No acute abnormality    · Small cervical chain lymph nodes as well as small nodules in the parotid gland, favorable of recurrence given patient history of lymphoma. Recommend PET-CT. Otherwise, US-guided FNA or core biopsy of the parotid nodules could be considered.      PET scan on 6/18/2020 documented:  · Mild asymmetrical uptake within the right posterior base of tongue along the left inguinal salivary gland with MAX SUV 4  · Mild FDG uptake within the intraparotid lymph nodes max SUV of 2.6 and the right and left intraparotid nodes  · Subcentimeter right posterior triangle lymph node has an SUV of 1.8  · New 1 cm superior segment left lower lobe nodule of the subpleural posterior medial surface with increased FDG uptake and SUV of 5.5, worrisome for malignancy. Lymphoma versus primary lung neoplasm considered. Given the small size and the positioning along the lateral margin of the descending thoracic aorta, percutaneous biopsy would be extremely difficult  · Stable borderline prominent size bilateral inguinal lymph nodes have mild FDG uptake with a maximum SUV of 2.5. · Right external iliac node has a maximum SUV of 2.5     I spoke to Dr. Maria M Hannah with thoracic surgery department at [facility] on 7/1/2020. He saw Reinaldo Alfred on 6/30/2020 with an in-depth and comprehensive consultation that is part of the record. Due to the fact that she has previously been radiated in the chest, the proximity of this lymph node or nodule to the aorta and the significant morbidity that may be involved with obtaining a surgical tissue diagnosis, recommendation is to have a follow-up CT scan in 6 months and hold off on intervention until there is more evidence of obvious growth or progression that unequivocally would require such an intervention for diagnosis. I believe this is reasonable. Additionally, if the needle biopsy of the parotid or if in ENT evaluation at [facility], she is found to have a lymphoproliferative disorder that would require systemic chemotherapy, then that should as well potentially address the abnormality noted in the chest.  I will follow this closely along with Dr. Valerio Soto as well. A follow-up CT scan of the chest will be performed in 1 ~December 2020 continued monitoring    #4  Abnormal Pap smear     She is followed at the [place] [address] group in [facility] by nurse practitioners Wendy Triplett and Regla Carmona and she reports that a biopsy that was performed of her either cervix or uterus was performed with follow-up ultrasounds planned.     #5.   Breast cancer tumor screening    She apparently had an abnormal mammogram of the right breast and is also under evaluation by Dr. Kofi Camarillo in   [facility]. This is where she has her routine breast cancer screening and will continue according to Marlene and her . #6  GI tumor screening    EGD and colonoscopy with biopsies were performed in [facility]  on 12/18/2018 by Dr. James Kramer and revealed the following:  · Antral gastritis  · Grade 3 ulcerative erosive reflux esophagitis with sliding hiatal hernia  · Lower esophageal benign narrowing  · Polyp in the cecum  · Rest of colonic exam unremarkable   Pathology revealed the following:  Biopsy, antrum:  · Mild chronic gastritis  · No intestinal metaplasia or dyplasia  · H. 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 CBC today (8/11/2020) reveals a WBC of 14.40. Hgb is 12.4 with an MCV of 97.8 and platelet count of 158,306. Continue conservative monitoring only necessary for this issue      IGabriel am scribing for Sergey Tee MD. Electronically signed by Di Crooks LPN on 4/37/6269 at 95:90 AM       Marlene was seen today for lymphoma. Diagnoses and all orders for this visit:    History of non-Hodgkin's lymphoma    Pulmonary nodules    Healthcare maintenance        No orders of the defined types were placed in this encounter. No orders of the defined types were placed in this encounter. No follow-ups on file. I, Dr. Mary Ann Zapata, personally performed the services described in this documentation as scribed by Di Crooks LPN in my presence, and it is both accurate and complete.

## 2020-08-11 ENCOUNTER — OFFICE VISIT (OUTPATIENT)
Dept: HEMATOLOGY | Age: 50
End: 2020-08-11
Payer: COMMERCIAL

## 2020-08-11 ENCOUNTER — HOSPITAL ENCOUNTER (OUTPATIENT)
Dept: INFUSION THERAPY | Age: 50
Discharge: HOME | End: 2020-08-11
Payer: COMMERCIAL

## 2020-08-11 VITALS
WEIGHT: 200.8 LBS | TEMPERATURE: 97.7 F | HEIGHT: 65 IN | SYSTOLIC BLOOD PRESSURE: 130 MMHG | DIASTOLIC BLOOD PRESSURE: 74 MMHG | BODY MASS INDEX: 33.45 KG/M2 | HEART RATE: 95 BPM | OXYGEN SATURATION: 96 %

## 2020-08-11 DIAGNOSIS — C85.90 NON-HODGKIN'S LYMPHOMA, UNSPECIFIED BODY REGION, UNSPECIFIED NON-HODGKIN LYMPHOMA TYPE (HCC): ICD-10-CM

## 2020-08-11 PROBLEM — Z00.00 HEALTHCARE MAINTENANCE: Status: ACTIVE | Noted: 2020-08-11

## 2020-08-11 LAB
BASOPHILS ABSOLUTE: 0.05 K/UL (ref 0.01–0.08)
BASOPHILS RELATIVE PERCENT: 0.3 % (ref 0.1–1.2)
EOSINOPHILS ABSOLUTE: 0.63 K/UL (ref 0.04–0.54)
EOSINOPHILS RELATIVE PERCENT: 4.4 % (ref 0.7–7)
HCT VFR BLD CALC: 39.5 % (ref 34.1–44.9)
HEMOGLOBIN: 12.4 G/DL (ref 11.2–15.7)
LYMPHOCYTES ABSOLUTE: 3.11 K/UL (ref 1.18–3.74)
LYMPHOCYTES RELATIVE PERCENT: 21.6 % (ref 19.3–53.1)
MCH RBC QN AUTO: 30.7 PG (ref 25.6–32.2)
MCHC RBC AUTO-ENTMCNC: 31.4 G/DL (ref 32.3–35.5)
MCV RBC AUTO: 97.8 FL (ref 79.4–94.8)
MONOCYTES ABSOLUTE: 2.11 K/UL (ref 0.24–0.82)
MONOCYTES RELATIVE PERCENT: 14.7 % (ref 4.7–12.5)
NEUTROPHILS ABSOLUTE: 8.5 K/UL (ref 1.56–6.13)
NEUTROPHILS RELATIVE PERCENT: 59 % (ref 34–71.1)
PDW BLD-RTO: 14.2 % (ref 11.7–14.4)
PLATELET # BLD: 449 K/UL (ref 182–369)
PMV BLD AUTO: 10.7 FL (ref 7.4–10.4)
RBC # BLD: 4.04 M/UL (ref 3.93–5.22)
WBC # BLD: 14.4 K/UL (ref 3.98–10.04)

## 2020-08-11 PROCEDURE — 99215 OFFICE O/P EST HI 40 MIN: CPT | Performed by: INTERNAL MEDICINE

## 2020-08-11 PROCEDURE — 99211 OFF/OP EST MAY X REQ PHY/QHP: CPT

## 2020-08-11 PROCEDURE — 85025 COMPLETE CBC W/AUTO DIFF WBC: CPT

## 2020-08-11 ASSESSMENT — ENCOUNTER SYMPTOMS
VOICE CHANGE: 0
ABDOMINAL PAIN: 0
VOMITING: 0
EYE DISCHARGE: 0
RECTAL PAIN: 0
EYE ITCHING: 0
NAUSEA: 0
CONSTIPATION: 0
SINUS PAIN: 0
BACK PAIN: 0
TROUBLE SWALLOWING: 0
CHEST TIGHTNESS: 0
EYE PAIN: 0
STRIDOR: 0
FACIAL SWELLING: 0
SHORTNESS OF BREATH: 0
WHEEZING: 0
DIARRHEA: 0
COLOR CHANGE: 0

## 2020-09-10 PROBLEM — Z00.00 HEALTHCARE MAINTENANCE: Status: RESOLVED | Noted: 2020-08-11 | Resolved: 2020-09-10

## 2020-10-29 NOTE — PROGRESS NOTES
Patient:  Serg Figueroa  YOB: 1970  Date of Service: 11/3/2020  MRN: [id number]    Primary Care Physician: Jimmie Kim    Chief Complaint   Patient presents with    Follow-up     History of non-Hodgkin's lymphoma       Patient Seen, Chart, Consults notes, Labs, Radiology studies reviewed. Subjective:  J Carlos Vogel is a 48year-old  female managed with primary and secondary diagnoses as outlined:  · Stage IIA NS Hodgkin's disease in 1992. 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 Co-managed with Dr. Jt Giang  · Tumor screening and health maintenance  · Right base of tongue nonspecific abnormality noted on PET scan at [facility] 1/3/2019, monitored by Dr. Fito Suarez, ENT at  [facility]  · S/P right complete parotidectomy by Dr. Fito Suarez at [facility] on 7/24/2020 for the 1.7 cm right parotid mass, path = reactive follicular hyperplasia, no evidence of Hodgkin's Dolphus Macie is here for continued monitoring of her LLL pulmonary nodule and other issues on review of systems. 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 Full serological workup was done including a CMP, LDH, B2M, serum protein electrophoresis and quantitative immunoglobulins, all which were normal.     A bone marrow biopsy and aspirate on 05/17/10 was performed and was virtually normal except for low iron stores.  Cytogenetics were normal. Hematologically this is consistent with a post splenectomy leukocytosis and will subsequently be monitored conservatively. Bone marrow 11/29/2017  Normocellular bone marrow for age (~50-60%) with trilineage hematopoiesis, no significant dyspoiesis and ~1-2% blasts   Polyclonal plasmacytosis (~68% on  stain)   No stainable storage iron present; no increase in reticulin fibrosis   Negative FISH STUDY for BCR/ABL translocation   Negative molecular study for MPN profile including JAK2 [id number] point mutation   Normal female karyotype   No evidence of involvement of Hodgkin's lymphoma       TUMOR HISTORY: Stage IIA nodular sclerosing Hodgkins lymphoma diagnosed 06/11/92  Nancy Baez was seen in initial oncology consultation on 6/26/1992 with a diagnosis of right supraclavicular nodular sclerosing Hodgkin's lymphoma for recommendations. Nancy Baez presented  in her seventh month of her first pregnancy in the early part of 1992 with a palpable  right supraclavicular lymph node. After delivery on 05/11/92 she sought attention for this. A right supraclavicular lymph node biopsy on 06/11/92   Pathology revealed a nodular sclerosing Hodgkins disease. Disease involved that area as well as the mediastinum. A bone marrow biopsy and aspirate did not show any evidence of metastatic disease. A staging laparotomy on 7/6/1992 that included lymph nodes biopsies and a splenectomy did not reveal any infradiaphragmatic malignant pathology. There were miliary granulomatous lesions noted throughout the spleen and on the surface of the liver, but no evidence of Hodgkins. She was referred to radiation therapy and was cared for by Dr. Dagoberto Easley in [place], [place]. He delivered approximately 3,960 cGy with a boost to the right neck. Her radiation therapy was in the mantle and periaortic lymph node fashion.      She has been monitored conservatively since that time    TREATMENT SUMMARY:  1. XRT to the right neck, as noted above       SECONDARY TUMOR MONITORING AND SCREENING:    #1  Subcentimeter pulmonary nodules and new 1 cm superior segment left lower lobe nodule of the subpleural posterior medial surface with increased FDG uptake and SUV of 5.5, worrisome for malignancy. Co-managed with Dr. Luis Rushing  developed chest pain and pressure and presented to the [facility] in [place] with a CTA chest on 1/10/2019 revealing 2 noncalcified nodules of the RLL-largest 6 mm. CT chest with contrast on 7/17/2019 documented:  · 6 mm RLL lung nodule, stable  · 4 mm RLL lung nodule, stable  · Suspect fatty infiltration of the liver, but evaluation is limited due to phase of contrast  · No lymphadenopathy      CT neck with contrast on 6/8/2020 at [facility] documented:  · No acute abnormality    · Small cervical chain lymph nodes as well as small nodules in the parotid gland, favorable of recurrence given patient history of lymphoma. Recommend PET-CT. Otherwise, US-guided FNA or core biopsy of the parotid nodules could be considered. PET scan on 6/18/2020 documented:  · Mild asymmetrical uptake within the right posterior base of tongue along the left inguinal salivary gland with MAX SUV 4  · Mild FDG uptake within the intraparotid lymph nodes max SUV of 2.6 and the right and left intraparotid nodes  · Subcentimeter right posterior triangle lymph node has an SUV of 1.8  · New 1 cm superior segment left lower lobe nodule of the subpleural posterior medial surface with increased FDG uptake and SUV of 5.5, worrisome for malignancy. Lymphoma versus primary lung neoplasm considered. Given the small size and the positioning along the lateral margin of the descending thoracic aorta, percutaneous biopsy would be extremely difficult  · Stable borderline prominent size bilateral inguinal lymph nodes have mild FDG uptake with a maximum SUV of 2.5.    · Right external iliac node has a maximum SUV of 2.5     I spoke to Dr. Richard Billy with thoracic surgery history of lymphoma. Recommend PET-CT. Otherwise, US-guided FNA or core biopsy of the parotid nodules could be considered. Examination today (6/15/2020) does not reveal evidence of palpable peripheral lymphadenopathy in the head and neck area. Inspection and digital palpation of the base of tongue with extraction of the tongue does not disclose nodules or masses in the base of tongue or in the head and neck area that I can appreciate on exam.    Abdulkadir Albert was evaluated by Dr. Maura Huggins on 6/16/2020  for further ENT evaluation. PET scan on 6/18/2020 documented:  · Mild asymmetrical uptake within the right posterior base of tongue along the left inguinal salivary gland with MAX SUV 4  · Mild FDG uptake within the intraparotid lymph nodes max SUV of 2.6 and the right and left intraparotid nodes  · Subcentimeter right posterior triangle lymph node has an SUV of 1.8  · New 1 cm superior segment left lower lobe nodule of the subpleural posterior medial surface with increased FDG uptake and SUV of 5.5, worrisome for malignancy. Lymphoma versus primary lung neoplasm considered. Given the small size and the positioning along the lateral margin of the descending thoracic aorta, percutaneous biopsy would be extremely difficult  · Stable borderline prominent size bilateral inguinal lymph nodes have mild FDG uptake with a maximum SUV of 2.5. · Right external iliac node has a maximum SUV of 2.5     US-guided FNA of the 1.7 cm right parotid mass was performed on 7/9/2020 at [facility]. Pathology revealed:  Parotid, right, FNA:   · Histiocytes and lymphocytes; negative for malignancy     Right complete parotidectomy was performed on 7/24/2020 by Dr. Maura Huggins at [facility].    Pathology revealed:  · Four lymph nodes with reactive follicular hyperplasia  · Findings consistent with a reactive process  · Increased reactive follicles with germinal centers throughout the lymph node  · The paracortical expansion with focal increase in histiocytes and Langerhans cells suggests mild dermatopathia. · No evidence of the patient's previously diagnosed Hodgkin lymphoma or other hematolymphoid  process. Correlation with clinical and other laboratory findings is advised. Allergies:  Compazine [prochlorperazine] and Prochlorperazine edisylate    Medicines:  Current Outpatient Medications   Medication Sig Dispense Refill    cephALEXin (KEFLEX) 500 MG capsule TAKE 1 CAPSULE BY MOUTH 4 TIMES DAILY FOR 5 DAYS      DULoxetine (CYMBALTA) 60 MG extended release capsule TAKE 1 CAPSULE BY MOUTH ONCE DAILY      metoprolol tartrate (LOPRESSOR) 50 MG tablet Take 50 mg by mouth 2 times daily      glipiZIDE (GLUCOTROL) 5 MG tablet Take 5 mg by mouth 2 times daily (before meals)      hydroxychloroquine (PLAQUENIL) 200 MG tablet Take 200 mg by mouth daily      albuterol sulfate HFA (PROAIR HFA) 108 (90 Base) MCG/ACT inhaler Inhale 2 puffs into the lungs every 6 hours as needed for Wheezing      Biotin 67734 MCG TBDP Take by mouth      hydrOXYzine (ATARAX) 25 MG tablet Take 25 mg by mouth every 8 hours as needed for Itching      cetirizine (ZYRTEC) 10 MG tablet Take 10 mg by mouth 2 times daily as needed for Allergies      metFORMIN (GLUCOPHAGE) 500 MG tablet Take 500 mg by mouth 2 times daily (with meals)      DULoxetine (CYMBALTA) 30 MG extended release capsule Take 30 mg by mouth daily 30mg AM 60mg PM      lansoprazole (PREVACID SOLUTAB) 15 MG disintegrating tablet Take 15 mg by mouth daily       SYNTHROID 125 MCG tablet Take 100 mcg by mouth daily        No current facility-administered medications for this visit.         Past Medical History:      Diagnosis Date    Anxiety     Carpal tunnel syndrome of right wrist 10/5/2017    Depression     Diabetes (HCC)     borderline in the past    GERD (gastroesophageal reflux disease)     Hodgkin's lymphoma ([facility] [address] 24 years ago    Tx with XRT    Hyperprolactinemia ([address]     Hypothyroidism     Multiple thyroid nodules     Nonalcoholic fatty liver disease     Tachycardia     S/P cardiac ablation 4/2016        Past Surgical History:      Procedure Laterality Date    ANKLE SURGERY      3 screws    BONE MARROW BIOPSY N/A 11/29/2017    BONE MARROW ASPIRATION BIOPSY performed by Willie Shea MD at [address] [place] [address] COLONOSCOPY      OTHER SURGICAL HISTORY      exploritory surgery for ganesh right side of neck    MA REVISE MEDIAN N/CARPAL TUNNEL SURG Right 10/5/2017    CARPAL TUNNEL RELEASE performed by Leo Van DO at [address] [facility] OR    SPLENECTOMY      TONSILLECTOMY      TUBAL LIGATION      TYMPANOSTOMY TUBE PLACEMENT          Family History:      Problem Relation Age of Onset    Breast Cancer Maternal Grandmother         Social History  Social History     Tobacco Use    Smoking status: Never Smoker    Smokeless tobacco: Never Used   Substance Use Topics    Alcohol use: Yes     Comment: occ    Drug use: No          Review of Systems   Constitutional: Negative for appetite change, fatigue, fever and unexpected weight change. HENT: Negative for ear pain, facial swelling, hearing loss, sinus pain, trouble swallowing and voice change. Eyes: Negative for pain, discharge, itching and visual disturbance. Respiratory: Negative for chest tightness, shortness of breath, wheezing and stridor. Cardiovascular: Negative for chest pain, palpitations and leg swelling. Gastrointestinal: Negative for abdominal pain, constipation, diarrhea, nausea, rectal pain and vomiting. Endocrine: Negative for polydipsia, polyphagia and polyuria. Genitourinary: Negative for dysuria, flank pain and hematuria. Musculoskeletal: Negative for back pain, gait problem, neck pain and neck stiffness. Skin: Negative for color change, pallor, rash and wound. Allergic/Immunologic: Negative for immunocompromised state.    Neurological: Negative for dizziness, speech difficulty, weakness, light-headedness and headaches. Hematological: Does not bruise/bleed easily. Psychiatric/Behavioral: Negative for confusion. The patient is not nervous/anxious. Objective:  Vital Signs: Blood pressure 122/68, pulse 93, temperature 98.1 °F (36.7 °C), temperature source Oral, height 5' 5\" (1.651 m), weight 197 lb 9.6 oz (89.6 kg), SpO2 96 %. Physical Exam  Constitutional:       Appearance: Normal appearance. She is normal weight. HENT:      Head: Normocephalic and atraumatic. Right Ear: External ear normal.      Left Ear: External ear normal.      Nose: Nose normal.      Mouth/Throat:      Mouth: Mucous membranes are moist.      Pharynx: Oropharynx is clear. Eyes:      General: No scleral icterus. Extraocular Movements: Extraocular movements intact. Conjunctiva/sclera: Conjunctivae normal.   Neck:      Musculoskeletal: Normal range of motion and neck supple. Cardiovascular:      Rate and Rhythm: Normal rate and regular rhythm. Pulses: Normal pulses. Heart sounds: Normal heart sounds. Pulmonary:      Effort: Pulmonary effort is normal.      Breath sounds: Normal breath sounds. No stridor. No wheezing or rales. Abdominal:      General: Abdomen is flat. Bowel sounds are normal. There is no distension. Palpations: Abdomen is soft. Musculoskeletal: Normal range of motion. General: No swelling or tenderness. Right lower leg: No edema. Left lower leg: No edema. Skin:     General: Skin is warm and dry. Findings: No lesion or rash. Neurological:      General: No focal deficit present. Mental Status: She is alert and oriented to person, place, and time. Mental status is at baseline. Motor: No weakness. Psychiatric:         Mood and Affect: Mood normal.         Labs:  BMP: No results for input(s): NA, K, CL, CO2, PHOS, BUN, CREATININE, CALCIUM in the last 72 hours.   CBC:   Recent Labs     11/03/20  1134   WBC 20.02*   HGB 12.3   HCT 38.5   MCV 95.8*   *     LIVER PROFILE: No results for input(s): AST, ALT, LIPASE, BILIDIR, BILITOT, ALKPHOS in the last 72 hours. Invalid input(s): AMYLASE,  ALB  PT/INR: No results for input(s): PROTIME, INR in the last 72 hours. APTT: No results for input(s): APTT in the last 72 hours. BNP:  No results for input(s): BNP in the last 72 hours. Ionized Calcium:No results for input(s): IONCA in the last 72 hours. Magnesium:No results for input(s): MG in the last 72 hours. Phosphorus:No results for input(s): PHOS in the last 72 hours. HgbA1C: No results for input(s): LABA1C in the last 72 hours. Hepatic: No results for input(s): ALKPHOS, ALT, AST, PROT, BILITOT, BILIDIR, LABALBU in the last 72 hours. Lactic Acid: No results for input(s): LACTA in the last 72 hours. Troponin: No results for input(s): CKTOTAL, CKMB, TROPONINT in the last 72 hours. ABGs: No results for input(s): PH, PCO2, PO2, HCO3, O2SAT in the last 72 hours. CRP:  No results for input(s): CRP in the last 72 hours. Sed Rate:  No results for input(s): SEDRATE in the last 72 hours. Cultures:   No results for input(s): CULTURE in the last 72 hours. 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 Technologist obtained history includes shortness of breath and pain. COMPARISON: 5/26/2017, 1/10/2019 (from [facility]) DLP: 500 mGy cm. Automated exposure control was utilized to minimize patient radiation dose. TECHNIQUE: Enhanced  CT images of the chest obtained with multiplanar reformats. FINDINGS: AIRWAYS/PULMONARY PARENCHYMA: The central airways are midline and patent. Right lower lobe with a 6 mm and 4 mm solid pulmonary nodule on axial series 2, image 56. These are stable compared to 1/10/2019 and 5/26/2017. Minimal biapical scarring.  No pleural effusion. No pneumothorax. VASCULATURE: Thoracic aorta is normal in course and caliber. Mild calcified aortic atherosclerosis. Normal pulmonary artery caliber. No large central pulmonary artery filling defect on this non-CTA exam. CARDIAC:  Cardiac size is normal.  No pericardial effusion. No gross coronary calcifications. MEDIASTINUM: There is no mediastinal or hilar lymphadenopathy by CT size criteria. Esophagus has normal coarse, caliber and wall thickness. EXTRATHORACIC: The visualized portions of the thyroid gland are unremarkable. No thoracic inlet or axillary adenopathy. The extrathoracic soft tissues are within normal limits. INCLUDED UPPER ABDOMEN: Phase of contrast limits evaluation, but suspect fatty infiltration of the liver. Adrenal glands and pancreatic tail within normal limits. The spleen is absent and there are nodular soft tissue densities in the splenic bed, likely splenosis. These are stable compared to 5/26/2017. OSSEOUS: Stable sclerotic lesion in the C7 vertebral body compared to 5/26/2017. No acute or suspicious bony finding. 1. Stable right lower lobe pulmonary nodules measuring up to 6 mm compared to 5/26/2017. No additional follow-up recommended. 2. Suspect fatty infiltration of the liver, but evaluation is limited due to phase of contrast. 3. No lymphadenopathy. Signed by Dr Efrain Irvin on 7/17/2019 1:56 PM       ASSESSMENT AND PLAN:    #1  Mckenna Barnes is a 48year-old  female managed with primary and secondary diagnoses as outlined:  · Stage IIA NS Hodgkin's disease in 1992. 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   Co-managed with Dr. Tosin Burnett O'Checo  · Tumor screening and health maintenance  · Right base of tongue nonspecific abnormality noted on PET scan at [facility] 1/3/2019, monitored by Dr. Marisela Maharaj, ENT at  [facility]  · S/P right complete parotidectomy by Dr. Marisela Maharaj at [facility] on 7/24/2020 for the 1.7 cm right parotid mass, path = reactive follicular hyperplasia, no evidence of Hodgkin's Svetlana Cousins is here for continued monitoring of her LLL pulmonary nodule and other issues on review of systems. Physical examination does not reveal evidence of palpable peripheral lymphadenopathy in the head and neck area, axilla supra or infraclavicular areas nor inguinal areas. The right total parotidectomy scar is barely visible in the preauricular area with excellent healing of the scar behind the right ear  Abdominal exam is without abdominal organomegaly or masses. Extremities no cyanosis clubbing or edema    CBC 7/1/2020 revealed a WBC of 19.23 with a hemoglobin of 13.7 and a platelet count of 580,772. CBC 8/11/2020 revealed a WBC of 14.40. Hgb is 12.4 with an MCV of 97.8 and platelet count of 363,172. CBC today (11/3/2020) reveals a WBC of 20.02. Hgb is 12.3 with an MCV of 95.8 and platelet count of 394,845. #2.  Right base of tongue nonspecific abnormality noted on PET scan at [facility] 1/3/2019, monitored by Dr. Marisela Maharaj, ENT at  [facility]  S/P right complete parotidectomy by Dr. Marisela Maharaj at [facility] on 7/24/2020 for the 1.7 cm right parotid mass    She saw Dr. Gita Ontiveros and reports that a PET scan on 1/3/2019 at [facility] identifying an abnormality at the base of the tongue-throat area. Dr. Gita Ontiveros referred her to Dr. Marisela Maharaj - ENT at [facility] and she was seen in January of 2019 with a scope that did find a small abnormality in the right tongue region but this was too small to biopsy with a follow-up arrangement made for March 2019. She did not have a follow-up with Dr. Gita Ontiveros.     CT neck with contrast on 6/8/2020 at [facility] documented:  · No acute abnormality    · Small cervical chain lymph nodes as well as small nodules in the parotid gland, favorable of recurrence given patient history of lymphoma. Recommend PET-CT. Otherwise, US-guided FNA or core biopsy of the parotid nodules could be considered. Examination today (6/15/2020) does not reveal evidence of palpable peripheral lymphadenopathy in the head and neck area. Inspection and digital palpation of the base of tongue with extraction of the tongue does not disclose nodules or masses in the base of tongue or in the head and neck area that I can appreciate on exam.    Catherine Dunaway was evaluated by Dr. Karen Del Castillo on 6/16/2020  for further ENT evaluation. PET scan on 6/18/2020 documented:  · Mild asymmetrical uptake within the right posterior base of tongue along the left inguinal salivary gland with MAX SUV 4  · Mild FDG uptake within the intraparotid lymph nodes max SUV of 2.6 and the right and left intraparotid nodes  · Subcentimeter right posterior triangle lymph node has an SUV of 1.8  · New 1 cm superior segment left lower lobe nodule of the subpleural posterior medial surface with increased FDG uptake and SUV of 5.5, worrisome for malignancy. Lymphoma versus primary lung neoplasm considered. Given the small size and the positioning along the lateral margin of the descending thoracic aorta, percutaneous biopsy would be extremely difficult  · Stable borderline prominent size bilateral inguinal lymph nodes have mild FDG uptake with a maximum SUV of 2.5. · Right external iliac node has a maximum SUV of 2.5     US-guided FNA of the 1.7 cm right parotid mass was performed on 7/9/2020 at [facility]. Pathology revealed:  Parotid, right, FNA:   · Histiocytes and lymphocytes; negative for malignancy     Right complete parotidectomy was performed on 7/24/2020 by Dr. Karen Del Castillo at [facility].    Pathology revealed:  · Four lymph nodes with reactive follicular hyperplasia  · Findings consistent with a reactive process  · Increased reactive follicles with germinal centers throughout the lymph node  · The paracortical expansion with focal increase in histiocytes and Langerhans cells suggests mild dermatopathia. · No evidence of the patient's previously diagnosed Hodgkin lymphoma or other hematolymphoid  process. Correlation with clinical and other laboratory findings is advised. Symptomatically, Lizzy Kerns is doing remarkably well postop. On examination, the right total parotidectomy scar is barely visible in the preauricular area with excellent healing of the scar behind the right ear    She is scheduled to follow-up at [facility] on 8/25/2020      #3. Subcentimeter pulmonary nodules and new 1 cm superior segment left lower lobe nodule of the subpleural posterior medial surface with increased FDG uptake and SUV of 5.5, worrisome for malignancy. Co-managed with Dr. Kate Dandy  developed chest pain and pressure and presented to the [facility] in [place] with a CTA chest on 1/10/2019 revealing 2 noncalcified nodules of the RLL-largest 6 mm. CT chest with contrast on 7/17/2019 documented:  · 6 mm RLL lung nodule, stable  · 4 mm RLL lung nodule, stable  · Suspect fatty infiltration of the liver, but evaluation is limited due to phase of contrast  · No lymphadenopathy      CT neck with contrast on 6/8/2020 at [facility] documented:  · No acute abnormality    · Small cervical chain lymph nodes as well as small nodules in the parotid gland, favorable of recurrence given patient history of lymphoma. Recommend PET-CT. Otherwise, US-guided FNA or core biopsy of the parotid nodules could be considered.      PET scan on 6/18/2020 documented:  · Mild asymmetrical uptake within the right posterior base of tongue along the left inguinal salivary gland with MAX SUV 4  · Mild FDG uptake within the intraparotid lymph nodes max SUV of 2.6 and the right and left intraparotid nodes  · Subcentimeter right posterior triangle lymph node has an SUV of 1.8  · New 1 cm superior segment left lower lobe nodule of the subpleural posterior medial surface with increased FDG uptake and SUV of 5.5, worrisome for malignancy. Lymphoma versus primary lung neoplasm considered. Given the small size and the positioning along the lateral margin of the descending thoracic aorta, percutaneous biopsy would be extremely difficult  · Stable borderline prominent size bilateral inguinal lymph nodes have mild FDG uptake with a maximum SUV of 2.5. · Right external iliac node has a maximum SUV of 2.5     I spoke to Dr. Vicki Blandon with thoracic surgery department at [facility] on 7/1/2020. He saw Ian Tijerina on 6/30/2020 with an in-depth and comprehensive consultation that is part of the record. Due to the fact that she has previously been radiated in the chest, the proximity of this lymph node or nodule to the aorta and the significant morbidity that may be involved with obtaining a surgical tissue diagnosis, recommendation is to have a follow-up CT scan in 6 months and hold off on intervention until there is more evidence of obvious growth or progression that unequivocally would require such an intervention for diagnosis. I believe this is reasonable. Additionally, if the needle biopsy of the parotid or if in ENT evaluation at [facility], she is found to have a lymphoproliferative disorder that would require systemic chemotherapy, then that should as well potentially address the abnormality noted in the chest.  I will follow this closely along with Dr. Giuseppe Lopez as well. CXR today (11/3/2020) documented:  · No acute cardiopulmonary abnormality. · 1 cm left lower lobe nodule seen on previous PET CT is not clearly demonstrated.  Repeat CT would be much more sensitive    A follow-up CT scan of the chest will be performed for non-Hodgkin's lymphoma  -     Cancel: CT CHEST W CONTRAST; Future  -     CT CHEST W CONTRAST; Future    Pulmonary nodules  -     CT CHEST W CONTRAST; Future    Healthcare maintenance        Orders Placed This Encounter   Procedures    CT CHEST W CONTRAST     Standing Status:   Future     Standing Expiration Date:   11/3/2021     Order Specific Question:   Reason for exam:     Answer:   lung nodules, HX lymphoma       No orders of the defined types were placed in this encounter. Return in about 3 weeks (around 11/24/2020) for follow-up w/ Dr. Lorraine Castro, CT scan(s) prior to. I, Dr. Mario Alberto Snell, personally performed the services described in this documentation [facility] in [facility] presence, and it is both accurate and complete.

## 2020-11-03 ENCOUNTER — HOSPITAL ENCOUNTER (OUTPATIENT)
Dept: INFUSION THERAPY | Age: 50
Discharge: HOME | End: 2020-11-03
Payer: COMMERCIAL

## 2020-11-03 ENCOUNTER — OFFICE VISIT (OUTPATIENT)
Dept: HEMATOLOGY | Age: 50
End: 2020-11-03
Payer: COMMERCIAL

## 2020-11-03 ENCOUNTER — HOSPITAL ENCOUNTER (OUTPATIENT)
Dept: GENERAL RADIOLOGY | Age: 50
Discharge: HOME | End: 2020-11-03
Payer: COMMERCIAL

## 2020-11-03 VITALS
HEART RATE: 93 BPM | TEMPERATURE: 98.1 F | DIASTOLIC BLOOD PRESSURE: 68 MMHG | SYSTOLIC BLOOD PRESSURE: 122 MMHG | HEIGHT: 65 IN | WEIGHT: 197.6 LBS | BODY MASS INDEX: 32.92 KG/M2 | OXYGEN SATURATION: 96 %

## 2020-11-03 DIAGNOSIS — C85.90 NON-HODGKIN'S LYMPHOMA, UNSPECIFIED BODY REGION, UNSPECIFIED NON-HODGKIN LYMPHOMA TYPE (HCC): ICD-10-CM

## 2020-11-03 LAB
BASOPHILS ABSOLUTE: 0.04 K/UL (ref 0.01–0.08)
BASOPHILS RELATIVE PERCENT: 0.2 % (ref 0.1–1.2)
EOSINOPHILS ABSOLUTE: 0.53 K/UL (ref 0.04–0.54)
EOSINOPHILS RELATIVE PERCENT: 2.6 % (ref 0.7–7)
HCT VFR BLD CALC: 38.5 % (ref 34.1–44.9)
HEMOGLOBIN: 12.3 G/DL (ref 11.2–15.7)
LYMPHOCYTES ABSOLUTE: 4.56 K/UL (ref 1.18–3.74)
LYMPHOCYTES RELATIVE PERCENT: 22.8 % (ref 19.3–53.1)
MCH RBC QN AUTO: 30.6 PG (ref 25.6–32.2)
MCHC RBC AUTO-ENTMCNC: 31.9 G/DL (ref 32.3–35.5)
MCV RBC AUTO: 95.8 FL (ref 79.4–94.8)
MONOCYTES ABSOLUTE: 2.22 K/UL (ref 0.24–0.82)
MONOCYTES RELATIVE PERCENT: 11.1 % (ref 4.7–12.5)
NEUTROPHILS ABSOLUTE: 12.67 K/UL (ref 1.56–6.13)
NEUTROPHILS RELATIVE PERCENT: 63.3 % (ref 34–71.1)
PDW BLD-RTO: 14.9 % (ref 11.7–14.4)
PLATELET # BLD: 418 K/UL (ref 182–369)
PMV BLD AUTO: 10.5 FL (ref 7.4–10.4)
RBC # BLD: 4.02 M/UL (ref 3.93–5.22)
WBC # BLD: 20.02 K/UL (ref 3.98–10.04)

## 2020-11-03 PROCEDURE — 85025 COMPLETE CBC W/AUTO DIFF WBC: CPT

## 2020-11-03 PROCEDURE — 99211 OFF/OP EST MAY X REQ PHY/QHP: CPT

## 2020-11-03 PROCEDURE — 99214 OFFICE O/P EST MOD 30 MIN: CPT | Performed by: INTERNAL MEDICINE

## 2020-11-03 PROCEDURE — 71046 X-RAY EXAM CHEST 2 VIEWS: CPT

## 2020-11-03 RX ORDER — KETOROLAC TROMETHAMINE 30 MG/ML
60 INJECTION, SOLUTION INTRAMUSCULAR; INTRAVENOUS
COMMUNITY
End: 2020-11-03

## 2020-11-03 RX ORDER — CEPHALEXIN 500 MG/1
CAPSULE ORAL
COMMUNITY
Start: 2020-10-26 | End: 2021-05-26 | Stop reason: ALTCHOICE

## 2020-11-03 RX ORDER — DULOXETIN HYDROCHLORIDE 60 MG/1
30 CAPSULE, DELAYED RELEASE ORAL
COMMUNITY
Start: 2020-10-27

## 2020-11-03 ASSESSMENT — ENCOUNTER SYMPTOMS
NAUSEA: 0
COLOR CHANGE: 0
SINUS PAIN: 0
STRIDOR: 0
CONSTIPATION: 0
RECTAL PAIN: 0
VOICE CHANGE: 0
SHORTNESS OF BREATH: 0
EYE DISCHARGE: 0
FACIAL SWELLING: 0
CHEST TIGHTNESS: 0
TROUBLE SWALLOWING: 0
EYE PAIN: 0
BACK PAIN: 0
VOMITING: 0
WHEEZING: 0
ABDOMINAL PAIN: 0
DIARRHEA: 0
EYE ITCHING: 0

## 2020-11-10 ENCOUNTER — HOSPITAL ENCOUNTER (OUTPATIENT)
Dept: CT IMAGING | Age: 50
Discharge: HOME | End: 2020-11-10
Payer: COMMERCIAL

## 2020-11-10 PROCEDURE — 71260 CT THORAX DX C+: CPT

## 2020-11-10 PROCEDURE — 6360000004 HC RX CONTRAST MEDICATION: Performed by: INTERNAL MEDICINE

## 2020-11-10 RX ADMIN — IOPAMIDOL 75 ML: 755 INJECTION, SOLUTION INTRAVENOUS at 11:34

## 2020-11-23 ASSESSMENT — ENCOUNTER SYMPTOMS
SHORTNESS OF BREATH: 0
EYE ITCHING: 0
CONSTIPATION: 0
FACIAL SWELLING: 0
VOMITING: 0
STRIDOR: 0
DIARRHEA: 0
WHEEZING: 0
NAUSEA: 0
CHEST TIGHTNESS: 0
COLOR CHANGE: 0
RECTAL PAIN: 0
SINUS PAIN: 0
VOICE CHANGE: 0
ABDOMINAL PAIN: 0
TROUBLE SWALLOWING: 0
EYE PAIN: 0
EYE DISCHARGE: 0
BACK PAIN: 0

## 2020-11-23 NOTE — PROGRESS NOTES
Patient:  Derrell Johnson  YOB: 1970  Date of Service: 11/24/2020  MRN: [id number]    Primary Care Physician: Tanner Rubio    Chief Complaint   Patient presents with    Follow-up     History of non-Hodgkin's lymphoma        Patient Seen, Chart, Consults notes, Labs, Radiology studies reviewed. 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 Full serological workup was done including a CMP, LDH, B2M, serum protein electrophoresis and quantitative immunoglobulins, all which were normal.     A bone marrow biopsy and aspirate on 05/17/10 was performed and was virtually normal except for low iron stores. Cytogenetics were normal. Hematologically this is consistent with a post splenectomy leukocytosis and will subsequently be monitored conservatively. Bone marrow 11/29/2017  Normocellular bone marrow for age (~50-60%) with trilineage hematopoiesis, no significant dyspoiesis and ~1-2% blasts   Polyclonal plasmacytosis (~68% on  stain)   No stainable storage iron present; no increase in reticulin fibrosis   Negative FISH STUDY for BCR/ABL translocation   Negative molecular study for MPN profile including JAK2 [id number] point mutation   Normal female karyotype   No evidence of involvement of Hodgkin's lymphoma       TUMOR HISTORY: Stage IIA nodular sclerosing Hodgkins lymphoma diagnosed 06/11/92  Telma Cheung was seen in initial oncology consultation on 6/26/1992 with a diagnosis of right supraclavicular nodular sclerosing Hodgkin's lymphoma for recommendations. Telma Cheung presented  in her seventh month of her first pregnancy in the early part of 1992 with a palpable  right supraclavicular lymph node. After delivery on 05/11/92 she sought attention for this.     A right supraclavicular lymph node biopsy on 06/11/92   Pathology revealed a nodular sclerosing Hodgkins disease. Disease involved that area as well as the mediastinum. A bone marrow biopsy and aspirate did not show any evidence of metastatic disease. A staging laparotomy on 7/6/1992 that included lymph nodes biopsies and a splenectomy did not reveal any infradiaphragmatic malignant pathology. There were miliary granulomatous lesions noted throughout the spleen and on the surface of the liver, but no evidence of Hodgkins. She was referred to radiation therapy and was cared for by Dr. Brenda Easley in [place], [place]. He delivered approximately 3,960 cGy with a boost to the right neck. Her radiation therapy was in the mantle and periaortic lymph node fashion. She has been monitored conservatively since that time    TREATMENT SUMMARY:  1. XRT to the right neck, as noted above       SECONDARY TUMOR MONITORING AND SCREENING:    #1  Subcentimeter pulmonary nodules and new 1 cm superior segment left lower lobe nodule of the subpleural posterior medial surface with increased FDG uptake and SUV of 5.5, worrisome for malignancy. Co-managed with Dr. Wang Sharee  developed chest pain and pressure and presented to the [facility] in [place] with a CTA chest on 1/10/2019 revealing 2 noncalcified nodules of the RLL-largest 6 mm. CT chest with contrast on 7/17/2019 documented:  · 6 mm RLL lung nodule, stable  · 4 mm RLL lung nodule, stable  · Suspect fatty infiltration of the liver, but evaluation is limited due to phase of contrast  · No lymphadenopathy      CT neck with contrast on 6/8/2020 at [facility] documented:  · No acute abnormality    · Small cervical chain lymph nodes as well as small nodules in the parotid gland, favorable of recurrence given patient history of lymphoma. Recommend PET-CT. Otherwise, US-guided FNA or core biopsy of the parotid nodules could be considered. PET scan on 6/18/2020 documented:  · Mild asymmetrical uptake within the right posterior base of tongue along the left inguinal salivary gland with MAX SUV 4  · Mild FDG uptake within the intraparotid lymph nodes max SUV of 2.6 and the right and left intraparotid nodes  · Subcentimeter right posterior triangle lymph node has an SUV of 1.8  · New 1 cm superior segment left lower lobe nodule of the subpleural posterior medial surface with increased FDG uptake and SUV of 5.5, worrisome for malignancy. Lymphoma versus primary lung neoplasm considered. Given the small size and the positioning along the lateral margin of the descending thoracic aorta, percutaneous biopsy would be extremely difficult  · Stable borderline prominent size bilateral inguinal lymph nodes have mild FDG uptake with a maximum SUV of 2.5. · Right external iliac node has a maximum SUV of 2.5     I spoke to Dr. Orville Alejandra with thoracic surgery department at [facility] on 7/1/2020. He saw Lavon Sanchez on 6/30/2020 with an in-depth and comprehensive consultation that is part of the record. Due to the fact that she has previously been radiated in the chest, the proximity of this lymph node or nodule to the aorta and the significant morbidity that may be involved with obtaining a surgical tissue diagnosis, recommendation is to have a follow-up CT scan in 6 months and hold off on intervention until there is more evidence of obvious growth or progression that unequivocally would require such an intervention for diagnosis. I believe this is reasonable. Additionally, if the needle biopsy of the parotid or if in ENT evaluation at [place], she is found to have a lymphoproliferative disorder that would require systemic chemotherapy, then that should as well potentially address the abnormality noted in the chest.  I will follow this closely along with Dr. Lux Hathaway as well.     A follow-up CT scan of the chest will be performed in 1 ~December 2020 continued monitoring    #2  Right base of tongue nonspecific abnormality noted on PET scan at [facility] 1/3/2019, monitored by Dr. Matthew Figueroa, ENT at  [facility]  S/P right complete parotidectomy by Dr. Matthew Figueroa at [facility] on 7/24/2020 for the 1.7 cm right parotid mass    She saw Dr. Yazan Hare and reports that a PET scan on 1/3/2019 at [facility] identifying an abnormality at the base of the tongue-throat area. Dr. Yazan Hare referred her to Dr. Matthew Figueroa - ENT at [facility] and she was seen in January of 2019 with a scope that did find a small abnormality in the right tongue region but this was too small to biopsy with a follow-up arrangement made for March 2019. She did not have a follow-up with Dr. Yazan Hare. CT neck with contrast on 6/8/2020 at [facility] documented:  · No acute abnormality    · Small cervical chain lymph nodes as well as small nodules in the parotid gland, favorable of recurrence given patient history of lymphoma. Recommend PET-CT. Otherwise, US-guided FNA or core biopsy of the parotid nodules could be considered. Examination today (6/15/2020) does not reveal evidence of palpable peripheral lymphadenopathy in the head and neck area. Inspection and digital palpation of the base of tongue with extraction of the tongue does not disclose nodules or masses in the base of tongue or in the head and neck area that I can appreciate on exam.    Chayito Cohen was evaluated by Dr. Matthew Figueroa on 6/16/2020  for further ENT evaluation.     PET scan on 6/18/2020 documented:  · Mild asymmetrical uptake within the right posterior base of tongue along the left inguinal salivary gland with MAX SUV 4  · Mild FDG uptake within the intraparotid lymph nodes max SUV of 2.6 and the right and left intraparotid nodes  · Subcentimeter right posterior triangle lymph node has an SUV of 1.8  · New 1 cm superior segment left lower lobe nodule of the subpleural posterior medial surface with increased FDG uptake and SUV of 5.5, worrisome for malignancy. Lymphoma versus primary lung neoplasm considered. Given the small size and the positioning along the lateral margin of the descending thoracic aorta, percutaneous biopsy would be extremely difficult  · Stable borderline prominent size bilateral inguinal lymph nodes have mild FDG uptake with a maximum SUV of 2.5. · Right external iliac node has a maximum SUV of 2.5     US-guided FNA of the 1.7 cm right parotid mass was performed on 7/9/2020 at [address] [place] [address]. Pathology revealed:  Parotid, right, FNA:   · Histiocytes and lymphocytes; negative for malignancy     Right complete parotidectomy was performed on 7/24/2020 by Dr. Rajani Arthur at [address] [place] [address]. Pathology revealed:  · Four lymph nodes with reactive follicular hyperplasia  · Findings consistent with a reactive process  · Increased reactive follicles with germinal centers throughout the lymph node  · The paracortical expansion with focal increase in histiocytes and Langerhans cells suggests mild dermatopathia. · No evidence of the patient's previously diagnosed Hodgkin lymphoma or other hematolymphoid  process. Correlation with clinical and other laboratory findings is advised.       Allergies:  Compazine [prochlorperazine] and Prochlorperazine edisylate    Medicines:  Current Outpatient Medications   Medication Sig Dispense Refill    cephALEXin (KEFLEX) 500 MG capsule TAKE 1 CAPSULE BY MOUTH 4 TIMES DAILY FOR 5 DAYS      DULoxetine (CYMBALTA) 60 MG extended release capsule TAKE 1 CAPSULE BY MOUTH ONCE DAILY      metoprolol tartrate (LOPRESSOR) 50 MG tablet Take 50 mg by mouth 2 times daily      glipiZIDE (GLUCOTROL) 5 MG tablet Take 5 mg by mouth 2 times daily (before meals)      hydroxychloroquine (PLAQUENIL) 200 MG tablet Take 200 mg by mouth daily      albuterol sulfate HFA (PROAIR HFA) 108 (90 Base) MCG/ACT inhaler Inhale 2 puffs into the lungs every 6 hours as needed for Wheezing      Biotin 46956 MCG TBDP Take by mouth      hydrOXYzine (ATARAX) 25 MG tablet Take 25 mg by mouth every 8 hours as needed for Itching      cetirizine (ZYRTEC) 10 MG tablet Take 10 mg by mouth 2 times daily as needed for Allergies      metFORMIN (GLUCOPHAGE) 500 MG tablet Take 500 mg by mouth 2 times daily (with meals)      DULoxetine (CYMBALTA) 30 MG extended release capsule Take 30 mg by mouth daily 30mg AM 60mg PM      lansoprazole (PREVACID SOLUTAB) 15 MG disintegrating tablet Take 15 mg by mouth daily       SYNTHROID 125 MCG tablet Take 100 mcg by mouth daily        No current facility-administered medications for this visit.         Past Medical History:      Diagnosis Date    Anxiety     Carpal tunnel syndrome of right wrist 10/5/2017    Depression     Diabetes (HCC)     borderline in the past    GERD (gastroesophageal reflux disease)     Hodgkin's lymphoma (HCC) 24 years ago    Tx with XRT    Hyperprolactinemia ([address]     Hypothyroidism     Multiple thyroid nodules     Nonalcoholic fatty liver disease     Tachycardia     S/P cardiac ablation 4/2016        Past Surgical History:      Procedure Laterality Date    ANKLE SURGERY      3 screws    BONE MARROW BIOPSY N/A 11/29/2017    BONE MARROW ASPIRATION BIOPSY [facility]      exploritory surgery for ganesh right side of neck    NJ REVISE MEDIAN N/CARPAL TUNNEL SURG Right 10/5/2017    CARPAL TUNNEL RELEASE performed by Dee Hunt DO at [facility] OR    SPLENECTOMY      TONSILLECTOMY      TUBAL LIGATION      TYMPANOSTOMY TUBE PLACEMENT          Family History:      Problem Relation Age of Onset    Breast Cancer Maternal Grandmother         Social History  Social History     Tobacco Use    Smoking status: Never Smoker    Smokeless tobacco: Never Used   Substance Use Topics    Alcohol use: Yes     Comment: occ    Drug use: No          Review of Systems   Constitutional: Negative for appetite change, fatigue, fever and unexpected weight change. HENT: Negative for ear pain, facial swelling, hearing loss, sinus pain, trouble swallowing and voice change. Eyes: Negative for pain, discharge, itching and visual disturbance. Respiratory: Negative for chest tightness, shortness of breath, wheezing and stridor. Cardiovascular: Negative for chest pain, palpitations and leg swelling. Gastrointestinal: Negative for abdominal pain, constipation, diarrhea, nausea, rectal pain and vomiting. Endocrine: Negative for polydipsia, polyphagia and polyuria. Genitourinary: Negative for dysuria, flank pain and hematuria. Musculoskeletal: Negative for back pain, gait problem, neck pain and neck stiffness. Skin: Negative for color change, pallor, rash and wound. Allergic/Immunologic: Negative for immunocompromised state. Neurological: Negative for dizziness, speech difficulty, weakness, light-headedness and headaches. Hematological: Does not bruise/bleed easily. Psychiatric/Behavioral: Negative for confusion. The patient is not nervous/anxious. Objective:  Vital Signs: Blood pressure 132/68, pulse 85, temperature 96.9 °F (36.1 °C), temperature source Temporal, height 5' 5\" (1.651 m), weight 197 lb 8 oz (89.6 kg), SpO2 96 %. Physical Exam  Constitutional:       Appearance: Normal appearance. She is normal weight. HENT:      Head: Normocephalic and atraumatic. Right Ear: External ear normal.      Left Ear: External ear normal.      Nose: Nose normal.      Mouth/Throat:      Mouth: Mucous membranes are moist.      Pharynx: Oropharynx is clear. Eyes:      General: No scleral icterus. Extraocular Movements: Extraocular movements intact. Conjunctiva/sclera: Conjunctivae normal.   Neck:      Musculoskeletal: Normal range of motion and neck supple. Cardiovascular:      Rate and Rhythm: Normal rate and regular rhythm. Pulses: Normal pulses.       Heart sounds: Normal heart sounds. Pulmonary:      Effort: Pulmonary effort is normal.      Breath sounds: Normal breath sounds. No stridor. No wheezing or rales. Abdominal:      General: Abdomen is flat. Bowel sounds are normal. There is no distension. Palpations: Abdomen is soft. Musculoskeletal: Normal range of motion. General: No swelling or tenderness. Right lower leg: No edema. Left lower leg: No edema. Skin:     General: Skin is warm and dry. Findings: No lesion or rash. Neurological:      General: No focal deficit present. Mental Status: She is alert and oriented to person, place, and time. Mental status is at baseline. Motor: No weakness. Psychiatric:         Mood and Affect: Mood normal.         Labs:  BMP: No results for input(s): NA, K, CL, CO2, PHOS, BUN, CREATININE, CALCIUM in the last 72 hours. CBC:   Recent Labs     11/24/20  0916   WBC 18.10*   HGB 13.2   HCT 42.0   MCV 97.7*   *     LIVER PROFILE: No results for input(s): AST, ALT, LIPASE, BILIDIR, BILITOT, ALKPHOS in the last 72 hours. Invalid input(s): AMYLASE,  ALB  PT/INR: No results for input(s): PROTIME, INR in the last 72 hours. APTT: No results for input(s): APTT in the last 72 hours. BNP:  No results for input(s): BNP in the last 72 hours. Ionized Calcium:No results for input(s): IONCA in the last 72 hours. Magnesium:No results for input(s): MG in the last 72 hours. Phosphorus:No results for input(s): PHOS in the last 72 hours. HgbA1C: No results for input(s): LABA1C in the last 72 hours. Hepatic: No results for input(s): ALKPHOS, ALT, AST, PROT, BILITOT, BILIDIR, LABALBU in the last 72 hours. Lactic Acid: No results for input(s): LACTA in the last 72 hours. Troponin: No results for input(s): CKTOTAL, CKMB, TROPONINT in the last 72 hours. ABGs: No results for input(s): PH, PCO2, PO2, HCO3, O2SAT in the last 72 hours.   CRP:  No results for input(s): CRP in the last 72 hours. Sed Rate:  No results for input(s): SEDRATE in the last 72 hours. Cultures:   No results for input(s): CULTURE in the last 72 hours. 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 Technologist obtained history includes shortness of breath and pain. COMPARISON: 5/26/2017, 1/10/2019 (from [facility]) DLP: 500 mGy cm. Automated exposure control was utilized to minimize patient radiation dose. TECHNIQUE: Enhanced  CT images of the chest obtained with multiplanar reformats. FINDINGS: AIRWAYS/PULMONARY PARENCHYMA: The central airways are midline and patent. Right lower lobe with a 6 mm and 4 mm solid pulmonary nodule on axial series 2, image 56. These are stable compared to 1/10/2019 and 5/26/2017. Minimal biapical scarring. No pleural effusion. No pneumothorax. VASCULATURE: Thoracic aorta is normal in course and caliber. Mild calcified aortic atherosclerosis. Normal pulmonary artery caliber. No large central pulmonary artery filling defect on this non-CTA exam. CARDIAC:  Cardiac size is normal.  No pericardial effusion. No gross coronary calcifications. MEDIASTINUM: There is no mediastinal or hilar lymphadenopathy by CT size criteria. Esophagus has normal coarse, caliber and wall thickness. EXTRATHORACIC: The visualized portions of the thyroid gland are unremarkable. No thoracic inlet or axillary adenopathy. The extrathoracic soft tissues are within normal limits. INCLUDED UPPER ABDOMEN: Phase of contrast limits evaluation, but suspect fatty infiltration of the liver. Adrenal glands and pancreatic tail within normal limits. The spleen is absent and there are nodular soft tissue densities in the splenic bed, likely splenosis. These are stable compared to 5/26/2017.  OSSEOUS: Stable sclerotic lesion in the C7 vertebral body compared to 5/26/2017. No acute or suspicious bony finding. 1. Stable right lower lobe pulmonary nodules measuring up to 6 mm compared to 5/26/2017. No additional follow-up recommended. 2. Suspect fatty infiltration of the liver, but evaluation is limited due to phase of contrast. 3. No lymphadenopathy. Signed by Dr Any Katz on 7/17/2019 1:56 PM       ASSESSMENT AND PLAN:    #1  Noy Daigle is a 48year-old  female managed with primary and secondary diagnoses as outlined:  · Stage IIA NS Hodgkin's disease in 1992. 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 Co-managed with Dr. Cheng Humphreys  · Tumor screening and health maintenance  · Right base of tongue nonspecific abnormality noted on PET scan at [facility] 1/3/2019, monitored by Dr. Adelfo Saucedo, ENT at  [facility]  · S/P right complete parotidectomy by Dr. Adelfo Saucedo at [facility] on 7/24/2020 for the 1.7 cm right parotid mass, path = reactive follicular hyperplasia, no evidence of Hodgkin's Clearence Gang is here for continued monitoring of a LLL pulmonary nodule, with repeat imaging prior to today's visit. Physical examination does not reveal evidence of palpable peripheral lymphadenopathy in the head and neck area, axilla supra or infraclavicular areas nor inguinal areas. The right total parotidectomy scar is barely visible in the preauricular area with excellent healing of the scar behind the right ear  Abdominal exam is without abdominal organomegaly or masses. Extremities no cyanosis clubbing or edema    CBC 7/1/2020 revealed a WBC of 19.23 with a hemoglobin of 13.7 and a platelet count of 678,700.     CBC 8/11/2020 revealed a WBC of 14.40. Hgb is 12.4 with an MCV of 97.8 and platelet count of 812,642. CBC 11/3/2020 revealed a WBC of 20.02. Hgb is 12.3 with an MCV of 95.8 and platelet count of 038,228. CBC today (11/24/2020) has a WBC of 18.1 with a hemoglobin of 13.2 and a platelet count of 576,417. #2.  Right base of tongue nonspecific abnormality noted on PET scan at [facility] 1/3/2019, monitored by Dr. Fish Bermeo, ENT at  [facility]  S/P right complete parotidectomy by Dr. Fish Bermeo at [facility] on 7/24/2020 for the 1.7 cm right parotid mass    She saw Dr. Jeanette Gale and reports that a PET scan on 1/3/2019 at [facility] identifying an abnormality at the base of the tongue-throat area. Dr. Jeanette Gale referred her to Dr. Fish Bermeo - ENT at [facility] and she was seen in January of 2019 with a scope that did find a small abnormality in the right tongue region but this was too small to biopsy with a follow-up arrangement made for March 2019. She did not have a follow-up with Dr. Jeanette Gale. CT neck with contrast on 6/8/2020 at [facility] documented:  · No acute abnormality    · Small cervical chain lymph nodes as well as small nodules in the parotid gland, favorable of recurrence given patient history of lymphoma. Recommend PET-CT. Otherwise, US-guided FNA or core biopsy of the parotid nodules could be considered. Examination today (6/15/2020) does not reveal evidence of palpable peripheral lymphadenopathy in the head and neck area. Inspection and digital palpation of the base of tongue with extraction of the tongue does not disclose nodules or masses in the base of tongue or in the head and neck area that I can appreciate on exam.    Jennifer was evaluated by Dr. Fish Bermeo on 6/16/2020  for further ENT evaluation.     PET scan on 6/18/2020 documented:  · Mild asymmetrical uptake within the right posterior base of tongue along the left inguinal salivary gland with MAX SUV 4  · Mild FDG uptake within the intraparotid lymph nodes max SUV of 2.6 and the right and left intraparotid nodes  · Subcentimeter right posterior triangle lymph node has an SUV of 1.8  · New 1 cm superior segment left lower lobe nodule of the subpleural posterior medial surface with increased FDG uptake and SUV of 5.5, worrisome for malignancy. Lymphoma versus primary lung neoplasm considered. Given the small size and the positioning along the lateral margin of the descending thoracic aorta, percutaneous biopsy would be extremely difficult  · Stable borderline prominent size bilateral inguinal lymph nodes have mild FDG uptake with a maximum SUV of 2.5. · Right external iliac node has a maximum SUV of 2.5     US-guided FNA of the 1.7 cm right parotid mass was performed on 7/9/2020 at [facility]. Pathology revealed:  Parotid, right, FNA:   · Histiocytes and lymphocytes; negative for malignancy     Right complete parotidectomy was performed on 7/24/2020 by Dr. Marisela Maharaj at [facility]. Pathology revealed:  · Four lymph nodes with reactive follicular hyperplasia  · Findings consistent with a reactive process  · Increased reactive follicles with germinal centers throughout the lymph node  · The paracortical expansion with focal increase in histiocytes and Langerhans cells suggests mild dermatopathia. · No evidence of the patient's previously diagnosed Hodgkin lymphoma or other hematolymphoid  process. Correlation with clinical and other laboratory findings is advised. Symptomatically, Nancy Baez is doing remarkably well postop. On examination, the right total parotidectomy scar is barely visible in the preauricular area with excellent healing of the scar behind the right ear    She is scheduled to follow-up at [facility] on 8/25/2020. Patient was seen by Dr Darion Quevedo on 8/25/2020 at [facility]  for post-op follow-up from superficial parotidectomy 7/24/20. She is doing well postoperatively and will not require any additional follow-ups.        #3.   Clemencia Guillermo pulmonary nodules and new 1 cm superior segment left lower lobe nodule of the subpleural posterior medial surface with increased FDG uptake and SUV of 5.5, worrisome for malignancy. Co-managed with Dr. Megan Singh  developed chest pain and pressure and presented to the [facility] AT [facility] in [place] with a CTA chest on 1/10/2019 revealing 2 noncalcified nodules of the RLL-largest 6 mm. CT chest with contrast on 7/17/2019 documented:  · 6 mm RLL lung nodule, stable  · 4 mm RLL lung nodule, stable  · Suspect fatty infiltration of the liver, but evaluation is limited due to phase of contrast  · No lymphadenopathy      CT neck with contrast on 6/8/2020 at [facility] documented:  · No acute abnormality    · Small cervical chain lymph nodes as well as small nodules in the parotid gland, favorable of recurrence given patient history of lymphoma. Recommend PET-CT. Otherwise, US-guided FNA or core biopsy of the parotid nodules could be considered. PET scan on 6/18/2020 documented:  · Mild asymmetrical uptake within the right posterior base of tongue along the left inguinal salivary gland with MAX SUV 4  · Mild FDG uptake within the intraparotid lymph nodes max SUV of 2.6 and the right and left intraparotid nodes  · Subcentimeter right posterior triangle lymph node has an SUV of 1.8  · New 1 cm superior segment left lower lobe nodule of the subpleural posterior medial surface with increased FDG uptake and SUV of 5.5, worrisome for malignancy. Lymphoma versus primary lung neoplasm considered. Given the small size and the positioning along the lateral margin of the descending thoracic aorta, percutaneous biopsy would be extremely difficult  · Stable borderline prominent size bilateral inguinal lymph nodes have mild FDG uptake with a maximum SUV of 2.5.    · Right external iliac node has a maximum SUV of 2.5     I spoke to Dr. Segundo Dennis with thoracic surgery department at [facility] on 7/1/2020. He saw Telma Cheung on 6/30/2020 with an in-depth and comprehensive consultation that is part of the record. Due to the fact that she has previously been radiated in the chest, the proximity of this lymph node or nodule to the aorta and the significant morbidity that may be involved with obtaining a surgical tissue diagnosis, recommendation is to have a follow-up CT scan in 6 months and hold off on intervention until there is more evidence of obvious growth or progression that unequivocally would require such an intervention for diagnosis. I believe this is reasonable. Additionally, if the needle biopsy of the parotid or if in ENT evaluation at [facility], she is found to have a lymphoproliferative disorder that would require systemic chemotherapy, then that should as well potentially address the abnormality noted in the chest.  I will follow this closely along with Dr. Marcel Olmos as well. CXR 11/3/2020 documented:  · No acute cardiopulmonary abnormality. · 1 cm left lower lobe nodule seen on previous PET CT is not clearly demonstrated. Repeat CT would be much more sensitive    A follow-up CT scan of the chest will be performed for continued monitoring. I will see back in follow-up in 3 weeks. CT CHEST on 11/10/2020 at [facility] ([facility]) documented:  · Stable medial left lower lobe 1 cm pulmonary nodule compared to 6/18/2020. · No lymphadenopathy. · Diffuse low-density of the liver, favored to be due to fatty liver. Follow-up CT scan will be done in 6 months      #4  Abnormal Pap smear     She is followed at the [address] in [facility] by nurse practitioners Marilyn Rodriguez and Ann Schaefer and she reports that a biopsy that was performed of her either cervix or uterus was performed with follow-up ultrasounds planned.     #5.   Breast cancer tumor screening    She apparently had an abnormal mammogram of the right breast and is also personally performed the services described in this documentation as scribed by Hamida Shipley LPN in my presence, and it is both accurate and complete.

## 2020-11-24 ENCOUNTER — OFFICE VISIT (OUTPATIENT)
Dept: HEMATOLOGY | Age: 50
End: 2020-11-24
Payer: COMMERCIAL

## 2020-11-24 ENCOUNTER — HOSPITAL ENCOUNTER (OUTPATIENT)
Dept: INFUSION THERAPY | Age: 50
Discharge: HOME | End: 2020-11-24
Payer: COMMERCIAL

## 2020-11-24 VITALS
WEIGHT: 197.5 LBS | TEMPERATURE: 96.9 F | BODY MASS INDEX: 32.9 KG/M2 | DIASTOLIC BLOOD PRESSURE: 68 MMHG | SYSTOLIC BLOOD PRESSURE: 132 MMHG | HEIGHT: 65 IN | OXYGEN SATURATION: 96 % | HEART RATE: 85 BPM

## 2020-11-24 DIAGNOSIS — C85.90 NON-HODGKIN'S LYMPHOMA, UNSPECIFIED BODY REGION, UNSPECIFIED NON-HODGKIN LYMPHOMA TYPE (HCC): ICD-10-CM

## 2020-11-24 LAB
BASOPHILS ABSOLUTE: 0.06 K/UL (ref 0.01–0.08)
BASOPHILS RELATIVE PERCENT: 0.3 % (ref 0.1–1.2)
EOSINOPHILS ABSOLUTE: 0.55 K/UL (ref 0.04–0.54)
EOSINOPHILS RELATIVE PERCENT: 3 % (ref 0.7–7)
HCT VFR BLD CALC: 42 % (ref 34.1–44.9)
HEMOGLOBIN: 13.2 G/DL (ref 11.2–15.7)
LYMPHOCYTES ABSOLUTE: 3.68 K/UL (ref 1.18–3.74)
LYMPHOCYTES RELATIVE PERCENT: 20.3 % (ref 19.3–53.1)
MCH RBC QN AUTO: 30.7 PG (ref 25.6–32.2)
MCHC RBC AUTO-ENTMCNC: 31.4 G/DL (ref 32.3–35.5)
MCV RBC AUTO: 97.7 FL (ref 79.4–94.8)
MONOCYTES ABSOLUTE: 2.36 K/UL (ref 0.24–0.82)
MONOCYTES RELATIVE PERCENT: 13 % (ref 4.7–12.5)
NEUTROPHILS ABSOLUTE: 11.45 K/UL (ref 1.56–6.13)
NEUTROPHILS RELATIVE PERCENT: 63.4 % (ref 34–71.1)
PDW BLD-RTO: 14.8 % (ref 11.7–14.4)
PLATELET # BLD: 637 K/UL (ref 182–369)
PMV BLD AUTO: 10.1 FL (ref 7.4–10.4)
RBC # BLD: 4.3 M/UL (ref 3.93–5.22)
WBC # BLD: 18.1 K/UL (ref 3.98–10.04)

## 2020-11-24 PROCEDURE — 99213 OFFICE O/P EST LOW 20 MIN: CPT | Performed by: INTERNAL MEDICINE

## 2020-11-24 PROCEDURE — 99211 OFF/OP EST MAY X REQ PHY/QHP: CPT

## 2020-11-24 PROCEDURE — 85025 COMPLETE CBC W/AUTO DIFF WBC: CPT

## 2020-12-03 PROBLEM — Z00.00 HEALTHCARE MAINTENANCE: Status: RESOLVED | Noted: 2020-08-11 | Resolved: 2020-12-03

## 2021-05-18 ENCOUNTER — HOSPITAL ENCOUNTER (OUTPATIENT)
Dept: CT IMAGING | Age: 51
Discharge: HOME | End: 2021-05-18
Payer: COMMERCIAL

## 2021-05-18 DIAGNOSIS — R91.8 PULMONARY NODULES: ICD-10-CM

## 2021-05-18 DIAGNOSIS — Z85.72 HISTORY OF NON-HODGKIN'S LYMPHOMA: ICD-10-CM

## 2021-05-18 PROCEDURE — 6360000004 HC RX CONTRAST MEDICATION: Performed by: INTERNAL MEDICINE

## 2021-05-18 PROCEDURE — 71260 CT THORAX DX C+: CPT

## 2021-05-18 RX ADMIN — IOPAMIDOL 75 ML: 755 INJECTION, SOLUTION INTRAVENOUS at 09:36

## 2021-05-21 NOTE — PROGRESS NOTES
monitored conservatively. Bone marrow 11/29/2017  Normocellular bone marrow for age (~50-60%) with trilineage hematopoiesis, no significant dyspoiesis and ~1-2% blasts   Polyclonal plasmacytosis (~68% on  stain)   No stainable storage iron present; no increase in reticulin fibrosis   Negative FISH STUDY for BCR/ABL translocation   Negative molecular study for MPN profile including JAK2 [id number] point mutation   Normal female karyotype   No evidence of involvement of Hodgkin's lymphoma       TUMOR HISTORY: Stage IIA nodular sclerosing Hodgkins lymphoma diagnosed 06/11/92  Sera Lewis was seen in initial oncology consultation on 6/26/1992 with a diagnosis of right supraclavicular nodular sclerosing Hodgkin's lymphoma for recommendations. Sera Lewis presented  in her seventh month of her first pregnancy in the early part of 1992 with a palpable  right supraclavicular lymph node. After delivery on 05/11/92 she sought attention for this. A right supraclavicular lymph node biopsy on 06/11/92   Pathology revealed a nodular sclerosing Hodgkins disease. Disease involved that area as well as the mediastinum. A bone marrow biopsy and aspirate did not show any evidence of metastatic disease. A staging laparotomy on 7/6/1992 that included lymph nodes biopsies and a splenectomy did not reveal any infradiaphragmatic malignant pathology. There were miliary granulomatous lesions noted throughout the spleen and on the surface of the liver, but no evidence of Hodgkins. She was referred to radiation therapy and was cared for by Dr. Celestine Easley in [place], [place]. He delivered approximately 3,960 cGy with a boost to the right neck. Her radiation therapy was in the mantle and periaortic lymph node fashion.      She has been monitored conservatively since that time    TREATMENT SUMMARY:  1. XRT to the right neck, as noted above       SECONDARY TUMOR MONITORING AND SCREENING:    #1  Subcentimeter pulmonary nodules and new 1 cm superior segment left lower lobe nodule of the subpleural posterior medial surface with increased FDG uptake and SUV of 5.5, worrisome for malignancy. Co-managed with Dr. Levi Scott  developed chest pain and pressure and presented to the [facility] in [place] with a CTA chest on 1/10/2019 revealing 2 noncalcified nodules of the RLLlargest 6 mm. CT chest with contrast on 7/17/2019 documented:  · 6 mm RLL lung nodule, stable  · 4 mm RLL lung nodule, stable  · Suspect fatty infiltration of the liver, but evaluation is limited due to phase of contrast  · No lymphadenopathy      CT neck with contrast on 6/8/2020 at [facility] documented:  · No acute abnormality    · Small cervical chain lymph nodes as well as small nodules in the parotid gland, favorable of recurrence given patient history of lymphoma. Recommend PET-CT. Otherwise, US-guided FNA or core biopsy of the parotid nodules could be considered. PET scan on 6/18/2020 documented:  · Mild asymmetrical uptake within the right posterior base of tongue along the left inguinal salivary gland with MAX SUV 4  · Mild FDG uptake within the intraparotid lymph nodes max SUV of 2.6 and the right and left intraparotid nodes  · Subcentimeter right posterior triangle lymph node has an SUV of 1.8  · New 1 cm superior segment left lower lobe nodule of the subpleural posterior medial surface with increased FDG uptake and SUV of 5.5, worrisome for malignancy. Lymphoma versus primary lung neoplasm considered. Given the small size and the positioning along the lateral margin of the descending thoracic aorta, percutaneous biopsy would be extremely difficult  · Stable borderline prominent size bilateral inguinal lymph nodes have mild FDG uptake with a maximum SUV of 2.5.    · Right external iliac node has a maximum SUV of 2.5     I spoke to Dr. Fly Barrow with thoracic surgery department at [facility] on 7/1/2020. He saw Marlene on 6/30/2020 with an in-depth and comprehensive consultation that is part of the record. Due to the fact that she has previously been radiated in the chest, the proximity of this lymph node or nodule to the aorta and the significant morbidity that may be involved with obtaining a surgical tissue diagnosis, recommendation is to have a follow-up CT scan in 6 months and hold off on intervention until there is more evidence of obvious growth or progression that unequivocally would require such an intervention for diagnosis. I believe this is reasonable. Additionally, if the needle biopsy of the parotid or if in ENT evaluation at [facility], she is found to have a lymphoproliferative disorder that would require systemic chemotherapy, then that should as well potentially address the abnormality noted in the chest.  I will follow this closely along with Dr. Ruben Wolff as well. A follow-up CT scan of the chest will be performed in 1 ~December 2020 continued monitoring      5/18/2021 - CT CHEST W CONTRAST at [facility], compared to 11/10/2020, documented:  · There is no significant interval change. · Nonmasslike density in the posterior medial left lower lobe adjacent and lateral to the descending thoracic aorta is unchanged and again measures 9.6 mm as in the previous study. · 2 adjacently located, well-defined, noncalcified nodules in the right lower lobe posteriorly are stable. These measure 4 mm and 5 mm in diameter. No change. · No new noncalcified nodules are seen. #2  Right base of tongue nonspecific abnormality noted on PET scan at [facility] 1/3/2019, monitored by Dr. Jayda Xiong, ENT at  [facility]  S/P right complete parotidectomy by Dr. Jayda Xiong at [facility] on 7/24/2020 for the 1.7 cm right parotid mass    She saw Dr. Kandy Tam and reports that a PET scan on 1/3/2019 at [facility] identifying an abnormality at the base of the tonguethroat area. Dr. Nallely Kaufman referred her to Dr. Mihai Mcgill - ENT at [facility] and she was seen in January of 2019 with a scope that did find a small abnormality in the right tongue region but this was too small to biopsy with a follow-up arrangement made for March 2019. She did not have a follow-up with Dr. Nallely Kaufman. CT neck with contrast on 6/8/2020 at [facility] documented:  · No acute abnormality    · Small cervical chain lymph nodes as well as small nodules in the parotid gland, favorable of recurrence given patient history of lymphoma. Recommend PET-CT. Otherwise, US-guided FNA or core biopsy of the parotid nodules could be considered. Examination today (6/15/2020) does not reveal evidence of palpable peripheral lymphadenopathy in the head and neck area. Inspection and digital palpation of the base of tongue with extraction of the tongue does not disclose nodules or masses in the base of tongue or in the head and neck area that I can appreciate on exam.    Abrahan Garcia was evaluated by Dr. Mihai Mcgill on 6/16/2020  for further ENT evaluation. PET scan on 6/18/2020 documented:  · Mild asymmetrical uptake within the right posterior base of tongue along the left inguinal salivary gland with MAX SUV 4  · Mild FDG uptake within the intraparotid lymph nodes max SUV of 2.6 and the right and left intraparotid nodes  · Subcentimeter right posterior triangle lymph node has an SUV of 1.8  · New 1 cm superior segment left lower lobe nodule of the subpleural posterior medial surface with increased FDG uptake and SUV of 5.5, worrisome for malignancy. Lymphoma versus primary lung neoplasm considered. Given the small size and the positioning along the lateral margin of the descending thoracic aorta, percutaneous biopsy would be extremely difficult  · Stable borderline prominent size bilateral inguinal lymph nodes have mild FDG uptake with a maximum SUV of 2.5.    · Right external iliac node has a maximum SUV of 2.5     US-guided FNA of the 1.7 cm right parotid mass was performed on 7/9/2020 at [facility]. Pathology revealed:  Parotid, right, FNA:   · Histiocytes and lymphocytes; negative for malignancy     Right complete parotidectomy was performed on 7/24/2020 by Dr. Mihai Mcgill at [facility]. Pathology revealed:  · Four lymph nodes with reactive follicular hyperplasia  · Findings consistent with a reactive process  · Increased reactive follicles with germinal centers throughout the lymph node  · The paracortical expansion with focal increase in histiocytes and Langerhans cells suggests mild dermatopathia. · No evidence of the patient's previously diagnosed Hodgkin lymphoma or other hematolymphoid  process. Correlation with clinical and other laboratory findings is advised. Allergies:  Compazine [prochlorperazine] and Prochlorperazine edisylate    Medicines:  Current Outpatient Medications   Medication Sig Dispense Refill    DULoxetine (CYMBALTA) 60 MG extended release capsule TAKE 1 CAPSULE BY MOUTH ONCE DAILY      metoprolol tartrate (LOPRESSOR) 50 MG tablet Take 50 mg by mouth 2 times daily      glipiZIDE (GLUCOTROL) 5 MG tablet Take 5 mg by mouth 2 times daily (before meals)      albuterol sulfate HFA (PROAIR HFA) 108 (90 Base) MCG/ACT inhaler Inhale 2 puffs into the lungs every 6 hours as needed for Wheezing      Biotin 54710 MCG TBDP Take by mouth      cetirizine (ZYRTEC) 10 MG tablet Take 10 mg by mouth 2 times daily as needed for Allergies      lansoprazole (PREVACID SOLUTAB) 15 MG disintegrating tablet Take 15 mg by mouth daily       SYNTHROID 125 MCG tablet Take 100 mcg by mouth daily       metFORMIN (GLUCOPHAGE) 500 MG tablet Take 500 mg by mouth 2 times daily (with meals) (Patient not taking: Reported on 5/26/2021)       No current facility-administered medications for this visit.        Past Medical History:      Diagnosis Date    Anxiety     Carpal tunnel syndrome of right wrist 10/5/2017    Depression     Diabetes ([address]     borderline in the past    GERD (gastroesophageal reflux disease)     Hodgkin's lymphoma ([address] 24 years ago    Tx with XRT    Hyperprolactinemia ([address]     Hypothyroidism     Multiple thyroid nodules     Nonalcoholic fatty liver disease     Tachycardia     S/P cardiac ablation 4/2016        Past Surgical History:      Procedure Laterality Date    ANKLE SURGERY      3 screws    BONE MARROW BIOPSY N/A 11/29/2017    BONE MARROW ASPIRATION BIOPSY performed by Abdirashid Kirk MD at [address] [place] [address]      OTHER SURGICAL HISTORY      exploritory surgery for ganesh right side of neck    AK REVISE MEDIAN N/CARPAL TUNNEL SURG Right 10/5/2017    CARPAL TUNNEL RELEASE performed by Mimi Parra DO at [facility] OR    SPLENECTOMY      TONSILLECTOMY      TUBAL LIGATION      TYMPANOSTOMY TUBE PLACEMENT          Family History:      Problem Relation Age of Onset    Breast Cancer Maternal Grandmother         Social History  Social History     Tobacco Use    Smoking status: Never Smoker    Smokeless tobacco: Never Used   Substance Use Topics    Alcohol use: Yes     Comment: occ    Drug use: No        REVIEW OF SYSTEMS:    Constitutional: no fever, no night sweats,  fatigue;   HEENT: no blurring of vision, no double vision, no hearing difficulty, no tinnitus,no ulceration, no dysphagia  Lungs: no cough, no shortness of breath, no wheeze;   CVS: no palpitation, no chest pain, no shortness of breath;  GI: no abdominal pain, no nausea , no vomiting, no constipation;   KATELYN: no dysuria, frequency and urgency, no hematuria, no kidney stones;   Musculoskeletal: no joint pain, swelling , stiffness;   Endocrine: no polyuria, polydypsia, no cold or heat intolerence; Hematology/lymphatic: no easy brusing or bleeding, no hx of clotting disorder; no peripheral adenopathy.   Dermatology: no skin rash, no eczema, no pruritis;   Psychiatry: no depression, no anxiety,no panic attacks, no suicide ideation; Neurology: no syncope, no seizures, no numbness or tingling of hands, no numbness or tingling of feet, no paresis;      PHYSICAL EXAM:    CONSTITUTIONAL: Alert, appropriate, no acute distress,   EYES: Non icteric, EOM intact, pupils equal round and reactive to light and accommodation. ENT: Oral mucus membranes moist, no oral pharyngeal lesions. External inspection of ears and nose are normal.   NECK: Supple, no masses. No palpable thyroid mass    CHEST/LUNGS: CTA bilaterally, normal respiratory effort   CARDIOVASCULAR: RRR, no murmurs. No lower extremity edema   ABDOMEN: soft non-tender, active bowel sounds, no hepatosplenomegaly. No palpable masses. EXTREMITIES: warm, Full ROM of all fours extremities. No focal weakness. SKIN: warm, dry with no rashes or lesions  LYMPH: No cervical, clavicular, axillary, or inguinal lymphadenopathy  NEUROLOGIC: follows commands, non focal.   PSYCH: mood and affect appropriate. Alert and oriented to time and place and person. Labs:  BMP: No results for input(s): NA, K, CL, CO2, PHOS, BUN, CREATININE, CALCIUM in the last 72 hours. CBC:   Recent Labs     05/26/21  0918   WBC 17.98*   HGB 12.5   HCT 40.4   MCV 95.5*        LIVER PROFILE: No results for input(s): AST, ALT, LIPASE, BILIDIR, BILITOT, ALKPHOS in the last 72 hours. Invalid input(s): AMYLASE,  ALB  PT/INR: No results for input(s): PROTIME, INR in the last 72 hours. APTT: No results for input(s): APTT in the last 72 hours. BNP:  No results for input(s): BNP in the last 72 hours. Ionized Calcium:No results for input(s): IONCA in the last 72 hours. Magnesium:No results for input(s): MG in the last 72 hours. Phosphorus:No results for input(s): PHOS in the last 72 hours. HgbA1C: No results for input(s): LABA1C in the last 72 hours. Hepatic: No results for input(s): ALKPHOS, ALT, AST, PROT, BILITOT, BILIDIR, LABALBU in the last 72 hours.   Lactic Acid: No results for input(s): LACTA in the last 72 hours. Troponin: No results for input(s): CKTOTAL, CKMB, TROPONINT in the last 72 hours. ABGs: No results for input(s): PH, PCO2, PO2, HCO3, O2SAT in the last 72 hours. CRP:  No results for input(s): CRP in the last 72 hours. Sed Rate:  No results for input(s): SEDRATE in the last 72 hours. Cultures:   No results for input(s): CULTURE in the last 72 hours. 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 Technologist obtained history includes shortness of breath and pain. COMPARISON: 5/26/2017, 1/10/2019 (from [facility]) DLP: 500 mGy cm. Automated exposure control was utilized to minimize patient radiation dose. TECHNIQUE: Enhanced  CT images of the chest obtained with multiplanar reformats. FINDINGS: AIRWAYS/PULMONARY PARENCHYMA: The central airways are midline and patent. Right lower lobe with a 6 mm and 4 mm solid pulmonary nodule on axial series 2, image 56. These are stable compared to 1/10/2019 and 5/26/2017. Minimal biapical scarring. No pleural effusion. No pneumothorax. VASCULATURE: Thoracic aorta is normal in course and caliber. Mild calcified aortic atherosclerosis. Normal pulmonary artery caliber. No large central pulmonary artery filling defect on this non-CTA exam. CARDIAC:  Cardiac size is normal.  No pericardial effusion. No gross coronary calcifications. MEDIASTINUM: There is no mediastinal or hilar lymphadenopathy by CT size criteria. Esophagus has normal coarse, caliber and wall thickness. EXTRATHORACIC: The visualized portions of the thyroid gland are unremarkable. No thoracic inlet or axillary adenopathy. The extrathoracic soft tissues are within normal limits. INCLUDED UPPER ABDOMEN: Phase of contrast limits evaluation, but suspect fatty infiltration of the liver. preauricular area with excellent healing of the scar behind the right ear  Abdominal exam is without abdominal organomegaly or masses. Extremities no cyanosis clubbing or edema    CBC 7/1/2020 revealed a WBC of 19.23 with a hemoglobin of 13.7 and a platelet count of 484,740. CBC 8/11/2020 revealed a WBC of 14.40. Hgb is 12.4 with an MCV of 97.8 and platelet count of 521,393. CBC 11/3/2020 revealed a WBC of 20.02. Hgb is 12.3 with an MCV of 95.8 and platelet count of 624,887. CBC 11/24/2020 had a WBC of 18.1 with a hemoglobin of 13.2 and a platelet count of 014,132. CBC today 5/26/2021 reveals a WBC of 17.98. Hgb is 12.5 with an MCV of 95.5 and platelet count of 719,671. #2.  Right base of tongue nonspecific abnormality noted on PET scan at [facility] 1/3/2019, monitored by Dr. Angel Stewart, ENT at  [facility]  S/P right complete parotidectomy by Dr. Angel Stewart at [facility] on 7/24/2020 for the 1.7 cm right parotid mass    She saw Dr. Margarita Nieves and reports that a PET scan on 1/3/2019 at [facility] identifying an abnormality at the base of the tonguethroat area. Dr. Margarita Nieves referred her to Dr. Angel Stewart - ENT at [facility] and she was seen in January of 2019 with a scope that did find a small abnormality in the right tongue region but this was too small to biopsy with a follow-up arrangement made for March 2019. She did not have a follow-up with Dr. Margarita Nieves. CT neck with contrast on 6/8/2020 at [facility] documented:  · No acute abnormality    · Small cervical chain lymph nodes as well as small nodules in the parotid gland, favorable of recurrence given patient history of lymphoma. Recommend PET-CT. Otherwise, US-guided FNA or core biopsy of the parotid nodules could be considered. Examination today (6/15/2020) does not reveal evidence of palpable peripheral lymphadenopathy in the head and neck area.   Inspection and digital palpation of the base of tongue with extraction of the tongue does not disclose nodules or masses in the base of tongue or in the head and neck area that I can appreciate on exam.    Tammy Tatum was evaluated by Dr. Isiah Fontenot on 6/16/2020  for further ENT evaluation. PET scan on 6/18/2020 documented:  · Mild asymmetrical uptake within the right posterior base of tongue along the left inguinal salivary gland with MAX SUV 4  · Mild FDG uptake within the intraparotid lymph nodes max SUV of 2.6 and the right and left intraparotid nodes  · Subcentimeter right posterior triangle lymph node has an SUV of 1.8  · New 1 cm superior segment left lower lobe nodule of the subpleural posterior medial surface with increased FDG uptake and SUV of 5.5, worrisome for malignancy. Lymphoma versus primary lung neoplasm considered. Given the small size and the positioning along the lateral margin of the descending thoracic aorta, percutaneous biopsy would be extremely difficult  · Stable borderline prominent size bilateral inguinal lymph nodes have mild FDG uptake with a maximum SUV of 2.5. · Right external iliac node has a maximum SUV of 2.5     US-guided FNA of the 1.7 cm right parotid mass was performed on 7/9/2020 at [facility]. Pathology revealed:  Parotid, right, FNA:   · Histiocytes and lymphocytes; negative for malignancy     Right complete parotidectomy was performed on 7/24/2020 by Dr. Isiah Fontenot at [facility]. Pathology revealed:  · Four lymph nodes with reactive follicular hyperplasia  · Findings consistent with a reactive process  · Increased reactive follicles with germinal centers throughout the lymph node  · The paracortical expansion with focal increase in histiocytes and Langerhans cells suggests mild dermatopathia. · No evidence of the patient's previously diagnosed Hodgkin lymphoma or other hematolymphoid  process. Correlation with clinical and other laboratory findings is advised. Symptomatically, Tammy Tatum is doing remarkably well postop.   On examination, the right total parotidectomy scar is barely visible in the preauricular area with excellent healing of the scar behind the right ear    She is scheduled to follow-up at [facility] on 8/25/2020. Patient was seen by Dr Cristal Caro on 8/25/2020 at [facility]  for post-op follow-up from superficial parotidectomy 7/24/20. She is doing well postoperatively and will not require any additional follow-ups. #3.   Subcentimeter pulmonary nodules and new 1 cm superior segment left lower lobe nodule of the subpleural posterior medial surface with increased FDG uptake and SUV of 5.5, worrisome for malignancy. Co-managed with Dr. Enriqueta Mcclendon  developed chest pain and pressure and presented to the [facility] in [place] with a CTA chest on 1/10/2019 revealing 2 noncalcified nodules of the RLLlargest 6 mm. CT chest with contrast on 7/17/2019 documented:  · 6 mm RLL lung nodule, stable  · 4 mm RLL lung nodule, stable  · Suspect fatty infiltration of the liver, but evaluation is limited due to phase of contrast  · No lymphadenopathy      CT neck with contrast on 6/8/2020 at [facility] documented:  · No acute abnormality    · Small cervical chain lymph nodes as well as small nodules in the parotid gland, favorable of recurrence given patient history of lymphoma. Recommend PET-CT. Otherwise, US-guided FNA or core biopsy of the parotid nodules could be considered. PET scan on 6/18/2020 documented:  · Mild asymmetrical uptake within the right posterior base of tongue along the left inguinal salivary gland with MAX SUV 4  · Mild FDG uptake within the intraparotid lymph nodes max SUV of 2.6 and the right and left intraparotid nodes  · Subcentimeter right posterior triangle lymph node has an SUV of 1.8  · New 1 cm superior segment left lower lobe nodule of the subpleural posterior medial surface with increased FDG uptake and SUV of 5.5, worrisome for malignancy. Lymphoma versus primary lung neoplasm considered. Given the small size and the positioning along the lateral margin of the descending thoracic aorta, percutaneous biopsy would be extremely difficult  · Stable borderline prominent size bilateral inguinal lymph nodes have mild FDG uptake with a maximum SUV of 2.5. · Right external iliac node has a maximum SUV of 2.5     I spoke to Dr. Rica Watkins with thoracic surgery department at [facility] on 7/1/2020. He saw Genet Viera on 6/30/2020 with an in-depth and comprehensive consultation that is part of the record. Due to the fact that she has previously been radiated in the chest, the proximity of this lymph node or nodule to the aorta and the significant morbidity that may be involved with obtaining a surgical tissue diagnosis, recommendation is to have a follow-up CT scan in 6 months and hold off on intervention until there is more evidence of obvious growth or progression that unequivocally would require such an intervention for diagnosis. I believe this is reasonable. Additionally, if the needle biopsy of the parotid or if in ENT evaluation at [facility], she is found to have a lymphoproliferative disorder that would require systemic chemotherapy, then that should as well potentially address the abnormality noted in the chest.  I will follow this closely along with Dr. Abby Toledo as well. CXR 11/3/2020 documented:  · No acute cardiopulmonary abnormality. · 1 cm left lower lobe nodule seen on previous PET CT is not clearly demonstrated. Repeat CT would be much more sensitive    A follow-up CT scan of the chest will be performed for continued monitoring. I will see back in follow-up in 3 weeks. CT CHEST on 11/10/2020 at [facility])  compared to 6/18/2020 documented:  · 1 cm, stable, medial left lower lobe pulmonary nodule. · No lymphadenopathy. · Diffuse low-density of the liver, favored to be due to fatty liver.       CT CHEST W CONTRAST on 5/18/2021 - at [address], compared to 11/10/2020, documented:  · There is no significant interval change. · Nonmasslike density in the posterior medial left lower lobe adjacent and lateral to the descending thoracic aorta is unchanged and again measures 9.6 mm as in the previous study. · 4 mm and 5 mm adjacently located, well-defined, noncalcified nodules in the right lower lobe posteriorly are stable with no change. · No new noncalcified nodules are seen. Imaging is stable to improved. As previously noted in 1992 at the time of staging laparotomy, Marlene had granulomatous changes in her spleen and on the surface of her liver. This is more than likely what is going on in her chest but continued monitoring will need to continue due to the fact that she had Hodgkin's disease and received radiation therapy. A repeat CT scan of the chest is being requested to be performed in 6 months. #4  Abnormal Pap smear     She is followed at the [address] in [facility] by nurse practitioners Gar Blizzard and Adrianna Rascon and she reports that a biopsy that was performed of her either cervix or uterus was performed with follow-up ultrasounds planned. #5.   Breast cancer tumor screening    She apparently had an abnormal mammogram of the right breast and is also under evaluation by Dr. Francisco Pineda in [facility]. This is where she has her routine breast cancer screening and will continue according to Marlene and her .       #6  GI tumor screening    EGD and colonoscopy with biopsies were performed in [facility]  on 12/18/2018 by Dr. Misty Brand and revealed the following:  · Antral gastritis  · Grade 3 ulcerative erosive reflux esophagitis with sliding hiatal hernia  · Lower esophageal benign narrowing  · Polyp in the cecum  · Rest of colonic exam unremarkable   Pathology revealed the following:  Biopsy, antrum:  · Mild chronic gastritis  · No intestinal metaplasia or dyplasia  · H. 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 #8  [address] has not had an active COVID infection, nor has she had any vaccination. Maddie Freed am scribing for Shilpi Bahena MD. Electronically signed by Tash Mckay RN on 5/26/2021 at 10:34 AM        Aaron Clark was seen today for follow-up. Diagnoses and all orders for this visit:    History of non-Hodgkin's lymphoma  -     CT CHEST W CONTRAST; Future    Pulmonary nodules  -     CT CHEST W CONTRAST; Future    Healthcare maintenance    Leukocytosis, unspecified type    History of splenectomy    Anemia, unspecified type  -     Vitamin B12; Future  -     Folate; Future        Orders Placed This Encounter   Procedures    CT CHEST W CONTRAST     Standing Status:   Future     Standing Expiration Date:   5/26/2022     Order Specific Question:   Reason for exam:     Answer:   HX LYMPHOMA. LUNG NODULES    Vitamin B12     Standing Status:   Future     Standing Expiration Date:   5/26/2022    Folate     Standing Status:   Future     Standing Expiration Date:   5/26/2022       No orders of the defined types were placed in this encounter. Return in about 6 months (around 11/26/2021) for follow-up with . I, Dr. Kassandra Ledezma, personally performed the services described in this documentation as scribed by Herminia Lozano LPN in my presence, and it is both accurate and complete.

## 2021-05-26 ENCOUNTER — OFFICE VISIT (OUTPATIENT)
Dept: HEMATOLOGY | Age: 51
End: 2021-05-26
Payer: COMMERCIAL

## 2021-05-26 ENCOUNTER — HOSPITAL ENCOUNTER (OUTPATIENT)
Dept: INFUSION THERAPY | Age: 51
Discharge: HOME | End: 2021-05-26
Payer: COMMERCIAL

## 2021-05-26 VITALS
OXYGEN SATURATION: 96 % | HEART RATE: 90 BPM | SYSTOLIC BLOOD PRESSURE: 124 MMHG | HEIGHT: 65 IN | BODY MASS INDEX: 33.14 KG/M2 | WEIGHT: 198.9 LBS | TEMPERATURE: 97.5 F | DIASTOLIC BLOOD PRESSURE: 68 MMHG

## 2021-05-26 DIAGNOSIS — Z90.81 HISTORY OF SPLENECTOMY: ICD-10-CM

## 2021-05-26 DIAGNOSIS — Z85.72 HISTORY OF NON-HODGKIN'S LYMPHOMA: Primary | ICD-10-CM

## 2021-05-26 DIAGNOSIS — D64.9 ANEMIA, UNSPECIFIED TYPE: ICD-10-CM

## 2021-05-26 DIAGNOSIS — D72.829 LEUKOCYTOSIS, UNSPECIFIED TYPE: ICD-10-CM

## 2021-05-26 DIAGNOSIS — R91.8 PULMONARY NODULES: ICD-10-CM

## 2021-05-26 DIAGNOSIS — C85.90 NON-HODGKIN'S LYMPHOMA, UNSPECIFIED BODY REGION, UNSPECIFIED NON-HODGKIN LYMPHOMA TYPE (HCC): ICD-10-CM

## 2021-05-26 DIAGNOSIS — Z00.00 HEALTHCARE MAINTENANCE: ICD-10-CM

## 2021-05-26 LAB
BASOPHILS ABSOLUTE: 0.05 K/UL (ref 0.01–0.08)
BASOPHILS RELATIVE PERCENT: 0.3 % (ref 0.1–1.2)
EOSINOPHILS ABSOLUTE: 1.01 K/UL (ref 0.04–0.54)
EOSINOPHILS RELATIVE PERCENT: 5.6 % (ref 0.7–7)
FOLATE: 11.6 NG/ML (ref 2.7–20)
HCT VFR BLD CALC: 40.4 % (ref 34.1–44.9)
HEMOGLOBIN: 12.5 G/DL (ref 11.2–15.7)
LYMPHOCYTES ABSOLUTE: 3.22 K/UL (ref 1.18–3.74)
LYMPHOCYTES RELATIVE PERCENT: 17.9 % (ref 19.3–53.1)
MCH RBC QN AUTO: 29.6 PG (ref 25.6–32.2)
MCHC RBC AUTO-ENTMCNC: 30.9 G/DL (ref 32.3–35.5)
MCV RBC AUTO: 95.5 FL (ref 79.4–94.8)
MONOCYTES ABSOLUTE: 2.34 K/UL (ref 0.24–0.82)
MONOCYTES RELATIVE PERCENT: 13 % (ref 4.7–12.5)
NEUTROPHILS ABSOLUTE: 11.36 K/UL (ref 1.56–6.13)
NEUTROPHILS RELATIVE PERCENT: 63.2 % (ref 34–71.1)
PDW BLD-RTO: 14.8 % (ref 11.7–14.4)
PLATELET # BLD: 337 K/UL (ref 182–369)
PMV BLD AUTO: 11.2 FL (ref 7.4–10.4)
RBC # BLD: 4.23 M/UL (ref 3.93–5.22)
VITAMIN B-12: 445 PG/ML (ref 239–931)
WBC # BLD: 17.98 K/UL (ref 3.98–10.04)

## 2021-05-26 PROCEDURE — 82746 ASSAY OF FOLIC ACID SERUM: CPT

## 2021-05-26 PROCEDURE — 99213 OFFICE O/P EST LOW 20 MIN: CPT

## 2021-05-26 PROCEDURE — 85025 COMPLETE CBC W/AUTO DIFF WBC: CPT

## 2021-05-26 PROCEDURE — 82607 VITAMIN B-12: CPT

## 2021-05-26 PROCEDURE — 99214 OFFICE O/P EST MOD 30 MIN: CPT | Performed by: INTERNAL MEDICINE

## 2021-10-19 ENCOUNTER — HOSPITAL ENCOUNTER (EMERGENCY)
Age: 51
Discharge: HOME | End: 2021-10-19
Attending: EMERGENCY MEDICINE
Payer: COMMERCIAL

## 2021-10-19 ENCOUNTER — APPOINTMENT (OUTPATIENT)
Dept: CT IMAGING | Age: 51
End: 2021-10-19
Payer: COMMERCIAL

## 2021-10-19 ENCOUNTER — APPOINTMENT (OUTPATIENT)
Dept: GENERAL RADIOLOGY | Age: 51
End: 2021-10-19
Payer: COMMERCIAL

## 2021-10-19 ENCOUNTER — TELEPHONE (OUTPATIENT)
Dept: INFUSION THERAPY | Age: 51
End: 2021-10-19

## 2021-10-19 VITALS
OXYGEN SATURATION: 98 % | RESPIRATION RATE: 20 BRPM | SYSTOLIC BLOOD PRESSURE: 150 MMHG | DIASTOLIC BLOOD PRESSURE: 87 MMHG | BODY MASS INDEX: 31.76 KG/M2 | TEMPERATURE: 97.7 F | HEART RATE: 97 BPM | WEIGHT: 186 LBS | HEIGHT: 64 IN

## 2021-10-19 DIAGNOSIS — R07.81 PLEURITIC CHEST PAIN: Primary | ICD-10-CM

## 2021-10-19 LAB
ALBUMIN SERPL-MCNC: 3.5 G/DL (ref 3.5–5.2)
ALP BLD-CCNC: 104 U/L (ref 35–104)
ALT SERPL-CCNC: 35 U/L (ref 5–33)
ANION GAP SERPL CALCULATED.3IONS-SCNC: 6 MMOL/L (ref 7–19)
ANISOCYTOSIS: ABNORMAL
AST SERPL-CCNC: 33 U/L (ref 5–32)
ATYPICAL LYMPHOCYTE RELATIVE PERCENT: 2 % (ref 0–8)
BANDED NEUTROPHILS RELATIVE PERCENT: 2 % (ref 0–5)
BASOPHILS ABSOLUTE: 0 K/UL (ref 0–0.2)
BASOPHILS RELATIVE PERCENT: 0 % (ref 0–1)
BILIRUB SERPL-MCNC: 0.4 MG/DL (ref 0.2–1.2)
BUN BLDV-MCNC: 8 MG/DL (ref 6–20)
CALCIUM SERPL-MCNC: 9.3 MG/DL (ref 8.6–10)
CHLORIDE BLD-SCNC: 100 MMOL/L (ref 98–111)
CO2: 28 MMOL/L (ref 22–29)
CREAT SERPL-MCNC: 0.8 MG/DL (ref 0.5–0.9)
D DIMER: 0.32 UG/ML FEU (ref 0–0.48)
EOSINOPHILS ABSOLUTE: 0.65 K/UL (ref 0–0.6)
EOSINOPHILS RELATIVE PERCENT: 3 % (ref 0–5)
GFR AFRICAN AMERICAN: >59
GFR NON-AFRICAN AMERICAN: >60
GLUCOSE BLD-MCNC: 146 MG/DL (ref 74–109)
HCT VFR BLD CALC: 38.6 % (ref 37–47)
HEMOGLOBIN: 12.2 G/DL (ref 12–16)
IMMATURE GRANULOCYTES #: 0.1 K/UL
INR BLD: 1.01 (ref 0.88–1.18)
LIPASE: 19 U/L (ref 13–60)
LYMPHOCYTES ABSOLUTE: 2.4 K/UL (ref 1.1–4.5)
LYMPHOCYTES RELATIVE PERCENT: 9 % (ref 20–40)
MCH RBC QN AUTO: 29.6 PG (ref 27–31)
MCHC RBC AUTO-ENTMCNC: 31.6 G/DL (ref 33–37)
MCV RBC AUTO: 93.7 FL (ref 81–99)
MICROCYTES: ABNORMAL
MONOCYTES ABSOLUTE: 2.6 K/UL (ref 0–0.9)
MONOCYTES RELATIVE PERCENT: 12 % (ref 0–10)
NEUTROPHILS ABSOLUTE: 15.9 K/UL (ref 1.5–7.5)
NEUTROPHILS RELATIVE PERCENT: 72 % (ref 50–65)
PDW BLD-RTO: 15.2 % (ref 11.5–14.5)
PLATELET # BLD: 573 K/UL (ref 130–400)
PLATELET SLIDE REVIEW: ABNORMAL
PMV BLD AUTO: 9.8 FL (ref 9.4–12.3)
POTASSIUM SERPL-SCNC: 4.2 MMOL/L (ref 3.5–5)
PRO-BNP: 62 PG/ML (ref 0–900)
PROTHROMBIN TIME: 13.5 SEC (ref 12–14.6)
RBC # BLD: 4.12 M/UL (ref 4.2–5.4)
SARS-COV-2, NAAT: NOT DETECTED
SODIUM BLD-SCNC: 134 MMOL/L (ref 136–145)
TOTAL PROTEIN: 9.7 G/DL (ref 6.6–8.7)
TROPONIN: <0.01 NG/ML (ref 0–0.03)
WBC # BLD: 21.5 K/UL (ref 4.8–10.8)

## 2021-10-19 PROCEDURE — 36415 COLL VENOUS BLD VENIPUNCTURE: CPT

## 2021-10-19 PROCEDURE — 84484 ASSAY OF TROPONIN QUANT: CPT

## 2021-10-19 PROCEDURE — 85379 FIBRIN DEGRADATION QUANT: CPT

## 2021-10-19 PROCEDURE — 99282 EMERGENCY DEPT VISIT SF MDM: CPT

## 2021-10-19 PROCEDURE — 6360000004 HC RX CONTRAST MEDICATION: Performed by: EMERGENCY MEDICINE

## 2021-10-19 PROCEDURE — 71045 X-RAY EXAM CHEST 1 VIEW: CPT

## 2021-10-19 PROCEDURE — 6360000002 HC RX W HCPCS: Performed by: EMERGENCY MEDICINE

## 2021-10-19 PROCEDURE — 85025 COMPLETE CBC W/AUTO DIFF WBC: CPT

## 2021-10-19 PROCEDURE — 87635 SARS-COV-2 COVID-19 AMP PRB: CPT

## 2021-10-19 PROCEDURE — 83880 ASSAY OF NATRIURETIC PEPTIDE: CPT

## 2021-10-19 PROCEDURE — 80053 COMPREHEN METABOLIC PANEL: CPT

## 2021-10-19 PROCEDURE — 83690 ASSAY OF LIPASE: CPT

## 2021-10-19 PROCEDURE — 96374 THER/PROPH/DIAG INJ IV PUSH: CPT

## 2021-10-19 PROCEDURE — 71275 CT ANGIOGRAPHY CHEST: CPT

## 2021-10-19 PROCEDURE — 85610 PROTHROMBIN TIME: CPT

## 2021-10-19 PROCEDURE — 96375 TX/PRO/DX INJ NEW DRUG ADDON: CPT

## 2021-10-19 PROCEDURE — 93005 ELECTROCARDIOGRAM TRACING: CPT | Performed by: EMERGENCY MEDICINE

## 2021-10-19 RX ORDER — HYDROCODONE BITARTRATE AND ACETAMINOPHEN 5; 325 MG/1; MG/1
1 TABLET ORAL EVERY 6 HOURS PRN
Qty: 12 TABLET | Refills: 0 | Status: SHIPPED | OUTPATIENT
Start: 2021-10-19 | End: 2021-10-20 | Stop reason: SDUPTHER

## 2021-10-19 RX ORDER — KETOROLAC TROMETHAMINE 30 MG/ML
15 INJECTION, SOLUTION INTRAMUSCULAR; INTRAVENOUS ONCE
Status: COMPLETED | OUTPATIENT
Start: 2021-10-19 | End: 2021-10-19

## 2021-10-19 RX ORDER — NAPROXEN 500 MG/1
500 TABLET ORAL 2 TIMES DAILY
Qty: 60 TABLET | Refills: 0 | Status: SHIPPED | OUTPATIENT
Start: 2021-10-19 | End: 2021-10-20 | Stop reason: SDUPTHER

## 2021-10-19 RX ORDER — ONDANSETRON 2 MG/ML
4 INJECTION INTRAMUSCULAR; INTRAVENOUS ONCE
Status: COMPLETED | OUTPATIENT
Start: 2021-10-19 | End: 2021-10-19

## 2021-10-19 RX ORDER — MORPHINE SULFATE 4 MG/ML
4 INJECTION, SOLUTION INTRAMUSCULAR; INTRAVENOUS ONCE
Status: COMPLETED | OUTPATIENT
Start: 2021-10-19 | End: 2021-10-19

## 2021-10-19 RX ADMIN — IOPAMIDOL 90 ML: 755 INJECTION, SOLUTION INTRAVENOUS at 16:19

## 2021-10-19 RX ADMIN — MORPHINE SULFATE 4 MG: 4 INJECTION, SOLUTION INTRAMUSCULAR; INTRAVENOUS at 15:00

## 2021-10-19 RX ADMIN — KETOROLAC TROMETHAMINE 15 MG: 30 INJECTION, SOLUTION INTRAMUSCULAR; INTRAVENOUS at 17:37

## 2021-10-19 RX ADMIN — ONDANSETRON HYDROCHLORIDE 4 MG: 2 SOLUTION INTRAMUSCULAR; INTRAVENOUS at 15:01

## 2021-10-19 ASSESSMENT — PAIN SCALES - GENERAL
PAINLEVEL_OUTOF10: 9
PAINLEVEL_OUTOF10: 5
PAINLEVEL_OUTOF10: 6

## 2021-10-19 ASSESSMENT — ENCOUNTER SYMPTOMS
ABDOMINAL PAIN: 1
COUGH: 1
SHORTNESS OF BREATH: 1
VOMITING: 0
BACK PAIN: 0

## 2021-10-19 NOTE — TELEPHONE ENCOUNTER
Patient called wanting her CT chest moved to a sooner Date. She has been sick with breathing problems been given steroids and antibiotics. She also was tested for COVID-19 and was negative as of yesterday. She is having trouble talking because she is not able to get her breath. I told patient it would be best to go to the ER (possibly in [facility]) to be evaluated, she declines at this time. I have called and had her CT Chest moved up to 10/26/2021 at 8:20 at [facility]. She voices understanding. Marina Carl.  Jacquelyn Piper

## 2021-10-19 NOTE — ED PROVIDER NOTES
[facility] EMERGENCY DEPT  eMERGENCY dEPARTMENT eNCOUnter      Pt Name: Dejon Alford  MRN: [id number]  [place] 1970  Date of evaluation: 10/19/2021  Provider: Yoandy Alberto MD    CHIEF COMPLAINT       Chief Complaint   Patient presents with    Shortness of Breath     since friday. followed by dr Alvino Sainz for poss cx lung mass. pt placed on steroids last night by PCP.  Abdominal Pain     upper abd pain. pt co poss hernia after abd sx. HISTORY OF PRESENT ILLNESS   (Location/Symptom, Timing/Onset,Context/Setting, Quality, Duration, Modifying Factors, Severity)  Note limiting factors. Dejon Alford is a 46 y.o. female who presents to the emergency department with pleurisy and chest pain and pain with inspriation bilateral breaths. Has non - hodgkins lympoma and is in remission x 28 years though. Also epigastric pain pain with possible hernia but nothing stuckout now today. covid test last night neg    Not vaccinated      fevers -    Cough +    Not on abx     Last night went to fast called in steroids x 3 days. Not taken any steroids yet. Patient has had a cardiac ablation for irregular heartbeat. The patient has no history of stents no history of DVT or PE. The history is provided by the patient and medical records. NursingNotes were reviewed. REVIEW OF SYSTEMS    (2-9 systems for level 4, 10 or more for level 5)     Review of Systems   Constitutional: Negative for fever. Respiratory: Positive for cough and shortness of breath. Cardiovascular: Positive for chest pain. Gastrointestinal: Positive for abdominal pain. Negative for vomiting. Genitourinary: Negative for dysuria. Musculoskeletal: Negative for back pain. Neurological: Negative for seizures. Psychiatric/Behavioral: Negative for confusion. A complete review of systems was performed and is negative except as noted above in the HPI.        PAST MEDICAL HISTORY     Past Medical History: Diagnosis Date    Anxiety     Carpal tunnel syndrome of right wrist 10/5/2017    Depression     Diabetes (HCC)     borderline in the past    GERD (gastroesophageal reflux disease)     Hodgkin's lymphoma (HCC) 24 years ago    Tx with XRT    Hyperprolactinemia ([address]     Hypothyroidism     Multiple thyroid nodules     Nonalcoholic fatty liver disease     Tachycardia     S/P cardiac ablation 4/2016         SURGICAL HISTORY       Past Surgical History:   Procedure Laterality Date    ANKLE SURGERY      3 screws    BONE MARROW BIOPSY N/A 11/29/2017    BONE MARROW ASPIRATION BIOPSY performed by Jun Beckford MD at [facility]      exploritory surgery for ganesh right side of neck    AK REVISE MEDIAN N/CARPAL TUNNEL SURG Right 10/5/2017    CARPAL TUNNEL RELEASE performed by Boni Maher DO at [facility]      TYMPANOSTOMY [facility]       Discharge Medication List as of 10/19/2021  6:39 PM      CONTINUE these medications which have NOT CHANGED    Details   DULoxetine (CYMBALTA) 60 MG extended release capsule TAKE 1 CAPSULE BY MOUTH ONCE DAILYHistorical Med      metoprolol tartrate (LOPRESSOR) 50 MG tablet Take 50 mg by mouth 2 times dailyHistorical Med      glipiZIDE (GLUCOTROL) 5 MG tablet Take 5 mg by mouth 2 times daily (before meals)Historical Med      albuterol sulfate HFA (PROAIR HFA) 108 (90 Base) MCG/ACT inhaler Inhale 2 puffs into the lungs every 6 hours as needed for WheezingHistorical Med      Biotin 50684 MCG TBDP Take by mouthHistorical Med      cetirizine (ZYRTEC) 10 MG tablet Take 10 mg by mouth 2 times daily as needed for AllergiesHistorical Med      metFORMIN (GLUCOPHAGE) 500 MG tablet Take 500 mg by mouth 2 times daily (with meals)Historical Med      lansoprazole (PREVACID SOLUTAB) 15 MG disintegrating tablet Take 15 mg by mouth daily Historical Med      SYNTHROID 125 MCG tablet Take 100 mcg by mouth daily , DAWHistorical Med             ALLERGIES     Compazine [prochlorperazine] and Prochlorperazine edisylate    FAMILY HISTORY       Family History   Problem Relation Age of Onset    Breast Cancer Maternal Grandmother           SOCIAL HISTORY       Social History     Socioeconomic History    Marital status:      Spouse name: Not on file    Number of children: Not on file    Years of education: Not on file    Highest education level: Not on file   Occupational History    Not on file   Tobacco Use    Smoking status: Never Smoker    Smokeless tobacco: Never Used   Substance and Sexual Activity    Alcohol use: Yes     Comment: occ    Drug use: No    Sexual activity: Yes   Other Topics Concern    Not on file   Social History Narrative    Not on file     Social Determinants of Health     Financial Resource Strain:     Difficulty of Paying Living Expenses:    Food Insecurity:     Worried About Running Out of Food in the Last Year:     [address] in the Last Year:    Transportation Needs:     Lack of Transportation (Medical):      Lack of Transportation (Non-Medical):    Physical Activity:     Days of Exercise per Week:     Minutes of Exercise per Session:    Stress:     Feeling of Stress :    Social Connections:     Frequency of Communication with Friends and Family:     Frequency of Social Gatherings with Friends and Family:     Attends [employer] Services:     Active Member of Clubs or Organizations:     Attends Club or Organization Meetings:     Marital Status:    Intimate Partner Violence:     Fear of Current or Ex-Partner:     Emotionally Abused:     Physically Abused:     Sexually Abused:        SCREENINGS             PHYSICAL EXAM    (up to 7 for level 4, 8 or more for level 5)     ED Triage Vitals [10/19/21 1231]   BP Temp Temp Source Pulse Resp SpO2 Height Weight   (!) 150/87 97.7 °F (36.5 °C) Temporal 97 20 98 % 5' 4\" (1.626 m) 186 lb (84.4 kg)       Physical Exam  Vitals and nursing note reviewed. Constitutional:       General: She is not in acute distress. Appearance: Normal appearance. HENT:      Head: Normocephalic and atraumatic. Eyes:      Extraocular Movements: Extraocular movements intact. Pupils: Pupils are equal, round, and reactive to light. Cardiovascular:      Rate and Rhythm: Normal rate and regular rhythm. Pulmonary:      Effort: Pulmonary effort is normal. No respiratory distress. Comments: Patient with pleuritic chest pain on inspiration  Abdominal:      General: Abdomen is flat. Palpations: Abdomen is soft. Comments: There is no epigastric obvious hernia on exam at this time. She states it comes and goes. Musculoskeletal:         General: No swelling or tenderness. Normal range of motion. Cervical back: Normal range of motion and neck supple. Skin:     General: Skin is warm and dry. Capillary Refill: Capillary refill takes less than 2 seconds. Neurological:      General: No focal deficit present. Mental Status: She is alert. Sensory: No sensory deficit. Motor: No weakness. Psychiatric:         Mood and Affect: Mood normal.         Behavior: Behavior normal.         DIAGNOSTIC RESULTS     EKG: All EKG's are interpreted by the Emergency Department Physician who either signs or Co-signs this chart in the absence of a cardiologist.    EKG sinus rhythm rate 93 QTC 4 3 7 ms. RADIOLOGY:   Non-plain film images such as CT, Ultrasound and MRI are read by the radiologist. Plainradiographic images are visualized and preliminarily interpreted by the emergency physician with the below findings:      Interpretation per the Radiologist below, if available at the time of this note:    CTA PULMONARY W CONTRAST   Final Result   1. Limited exam due to phase of contrast. No large central pulmonary   artery filling defect.    2. No lymphadenopathy or suspicious pulmonary finding. 3. Coronary artery calcifications. 4. Diffuse low-density of the liver, which may be exaggerated by phase   of contrast, but concerning for fatty liver. Signed by Dr Soledad Adamson   Final Result   1. No acute cardiopulmonary finding. Signed by Dr Parmjit Coulter            ED BEDSIDE ULTRASOUND:   Performed by ED Physician - none    LABS:  Labs Reviewed   COMPREHENSIVE METABOLIC PANEL - Abnormal; Notable for the following components:       Result Value    Sodium 134 (*)     Anion Gap 6 (*)     Glucose 146 (*)     Total Protein 9.7 (*)     ALT 35 (*)     AST 33 (*)     All other components within normal limits   CBC WITH AUTO DIFFERENTIAL - Abnormal; Notable for the following components:    WBC 21.5 (*)     RBC 4.12 (*)     MCHC 31.6 (*)     RDW 15.2 (*)     Platelets 871 (*)     Neutrophils % 72.0 (*)     Lymphocytes % 9.0 (*)     Monocytes % 12.0 (*)     Neutrophils Absolute 15.9 (*)     Monocytes Absolute 2.60 (*)     Eosinophils Absolute 0.65 (*)     Anisocytosis 1+ (*)     Microcytes 1+ (*)     All other components within normal limits   COVID-19, RAPID   D-DIMER, QUANTITATIVE   LIPASE   PROTIME-INR   TROPONIN   BRAIN NATRIURETIC PEPTIDE       All other labs were within normal range or not returned as of this dictation.     EMERGENCY DEPARTMENT COURSE and DIFFERENTIALDIAGNOSIS/MDM:   Vitals:    Vitals:    10/19/21 1231   BP: (!) 150/87   Pulse: 97   Resp: 20   Temp: 97.7 °F (36.5 °C)   TempSrc: Temporal   SpO2: 98%   Weight: 186 lb (84.4 kg)   Height: 5' 4\" (1.626 m)       MDM  Number of Diagnoses or Management Options  Pleuritic chest pain  Diagnosis management comments: Pt with pleuritic cp    ddimer neg    Given her hx of lymphoma ct done    No pe    Stable for dc    Treat pain    Is all pleuritic is not acs    Somewhat reproducible on exam too no rash         Amount and/or Complexity of Data Reviewed  Clinical lab tests: ordered and reviewed  Tests in the radiology section of CPT®: ordered and reviewed  Independent visualization of images, tracings, or specimens: yes    Patient Progress  Patient progress: stable        CONSULTS:  None    PROCEDURES:  Unless otherwise notedbelow, none     Procedures    FINAL IMPRESSION     1. Pleuritic chest pain          DISPOSITION/PLAN   DISPOSITION Decision To Discharge 10/19/2021 06:29:04 PM      PATIENT REFERRED TO:  Jaqueline Gruber  [address] [facility] [address]  [phone]    Schedule an appointment as soon as possible for a visit in 1 week  also see onc and discuss your wbc being up      DISCHARGE MEDICATIONS:  Discharge Medication List as of 10/19/2021  6:39 PM      START taking these medications    Details   HYDROcodone-acetaminophen (NORCO) 5-325 MG per tablet Take 1 tablet by mouth every 6 hours as needed for Pain for up to 3 days. Intended supply: 3 days. Take lowest dose possible to manage pain, Disp-12 tablet, R-0Normal      naproxen (NAPROSYN) 500 MG tablet Take 1 tablet by mouth 2 times daily, Disp-60 tablet, R-0Normal           Attestation: The Prescription Monitoring Report for this patient was reviewed today. Manas Magallanes MD)  Periodic Controlled Substance Monitoring: Possible medication side effects, risk of tolerance/dependence & alternative treatments discussed., No signs of potential drug abuse or diversion identified.  Manas Magallanes MD)    (Please note that portions of this note were completed with a voice recognition program.  Efforts were made to edit the dictations butoccasionally words are mis-transcribed.)    Manas Magallanes MD (electronically signed)  AttendingEmergency Physician         Manas Magallanes MD  10/20/21 8074

## 2021-10-20 RX ORDER — HYDROCODONE BITARTRATE AND ACETAMINOPHEN 5; 325 MG/1; MG/1
1 TABLET ORAL EVERY 6 HOURS PRN
Qty: 12 TABLET | Refills: 0 | Status: SHIPPED | OUTPATIENT
Start: 2021-10-20 | End: 2021-10-23

## 2021-10-20 RX ORDER — NAPROXEN 500 MG/1
500 TABLET ORAL 2 TIMES DAILY
Qty: 60 TABLET | Refills: 0 | Status: SHIPPED | OUTPATIENT
Start: 2021-10-20

## 2021-10-22 LAB
EKG P AXIS: 72 DEGREES
EKG P-R INTERVAL: 152 MS
EKG Q-T INTERVAL: 346 MS
EKG QRS DURATION: 82 MS
EKG QTC CALCULATION (BAZETT): 403 MS
EKG T AXIS: 38 DEGREES

## 2021-10-22 PROCEDURE — 93010 ELECTROCARDIOGRAM REPORT: CPT | Performed by: INTERNAL MEDICINE

## 2021-10-25 ENCOUNTER — HOSPITAL ENCOUNTER (OUTPATIENT)
Dept: CT IMAGING | Age: 51
Discharge: HOME | End: 2021-10-25
Payer: COMMERCIAL

## 2021-10-25 DIAGNOSIS — Z85.72 HISTORY OF NON-HODGKIN'S LYMPHOMA: ICD-10-CM

## 2021-10-25 DIAGNOSIS — R91.8 PULMONARY NODULES: ICD-10-CM

## 2021-10-25 PROCEDURE — 71260 CT THORAX DX C+: CPT

## 2021-10-25 PROCEDURE — 6360000004 HC RX CONTRAST MEDICATION: Performed by: INTERNAL MEDICINE

## 2021-10-25 RX ADMIN — IOPAMIDOL 60 ML: 755 INJECTION, SOLUTION INTRAVENOUS at 09:12

## 2021-11-16 DIAGNOSIS — Z85.72 HISTORY OF NON-HODGKIN'S LYMPHOMA: Primary | ICD-10-CM

## 2021-11-18 ENCOUNTER — HOSPITAL ENCOUNTER (OUTPATIENT)
Dept: INFUSION THERAPY | Age: 51
End: 2021-11-18
Payer: COMMERCIAL

## 2021-11-22 NOTE — PROGRESS NOTES
Patient:  Loreto Tapia  YOB: 1970  Date of Service: 11/30/2021  MRN: [id number]    Primary Care Physician: Priyanka Shearer    Chief Complaint   Patient presents with    Follow-up     History of non-Hodgkin's lymphoma       Patient Seen, Chart, Consults notes, Labs, Radiology studies reviewed. Subjective:    Joyce Griffiths is a 46year-old  female managed with primary and secondary diagnoses as outlined:  · Stage IIA NS Hodgkin's disease in 1992. 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 Co-managed with Dr. Mp Oropeza  · Tumor screening and health maintenance  · Right base of tongue nonspecific abnormality noted on PET scan at [facility] 1/3/2019, monitored by Dr. Carol Balbuena, ENT at  [facility]  · S/P right complete parotidectomy by Dr. Carol Balbuena at [facility] on 7/24/2020 for the 1.7 cm right parotid mass, path = reactive follicular hyperplasia, no evidence of Hodgkin's Samirasingh Palacios is here for secondary tumor monitoring of a LLL pulmonary nodule, with repeat imaging prior to today's visit. Joyce Griffiths is without B symptoms. She has no new specific manifestations other than occasional left hip pain that comes and is significant and will be completely gone by the next day sometimes. Her rheumatologist, Dr. Hima Alvarez is treating her for fibromyalgia. She has hydrocodone but does not like to take it unless she absolutely has to. She rarely takes it because many times the pain goes away on its own.       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 Full serological workup was done including a CMP, LDH, B2M, serum protein electrophoresis and quantitative immunoglobulins, all which were normal.     A bone marrow biopsy and aspirate on 05/17/10 was performed and was virtually normal except for low iron stores. Cytogenetics were normal. Hematologically this is consistent with a post splenectomy leukocytosis and will subsequently be monitored conservatively. Bone marrow 11/29/2017  Normocellular bone marrow for age (~50-60%) with trilineage hematopoiesis, no significant dyspoiesis and ~1-2% blasts   Polyclonal plasmacytosis (~68% on  stain)   No stainable storage iron present; no increase in reticulin fibrosis   Negative FISH STUDY for BCR/ABL translocation   Negative molecular study for MPN profile including JAK2 [id number] point mutation   Normal female karyotype   No evidence of involvement of Hodgkin's lymphoma       TUMOR HISTORY: Stage IIA nodular sclerosing Hodgkins lymphoma diagnosed 06/11/92  Marlene was seen in initial oncology consultation on 6/26/1992 with a diagnosis of right supraclavicular nodular sclerosing Hodgkin's lymphoma for recommendations. Marlene presented  in her seventh month of her first pregnancy in the early part of 1992 with a palpable  right supraclavicular lymph node. After delivery on 05/11/92 she sought attention for this. A right supraclavicular lymph node biopsy on 06/11/92   Pathology revealed a nodular sclerosing Hodgkins disease. Disease involved that area as well as the mediastinum. A bone marrow biopsy and aspirate did not show any evidence of metastatic disease. A staging laparotomy on 7/6/1992 that included lymph nodes biopsies and a splenectomy did not reveal any infradiaphragmatic malignant pathology. There were miliary granulomatous lesions noted throughout the spleen and on the surface of the liver, but no evidence of Hodgkins.      She was referred to radiation therapy and was cared for by Dr. Laura Easley in [place], [place]. He delivered approximately 3,960 cGy with a boost to the right neck. Her radiation therapy was in the mantle and periaortic lymph node fashion. She has been monitored conservatively since that time    TREATMENT SUMMARY:  1. XRT to the right neck, as noted above       SECONDARY TUMOR MONITORING AND SCREENING:    #1  Subcentimeter pulmonary nodules and new 1 cm superior segment left lower lobe nodule of the subpleural posterior medial surface with increased FDG uptake and SUV of 5.5, worrisome for malignancy. Co-managed with Dr. Bennett Fregoso  developed chest pain and pressure and presented to the [facility] in [place] with a CTA chest on 1/10/2019 revealing 2 noncalcified nodules of the RLLlargest 6 mm. CT chest with contrast on 7/17/2019 documented:  · 6 mm RLL lung nodule, stable  · 4 mm RLL lung nodule, stable  · Suspect fatty infiltration of the liver, but evaluation is limited due to phase of contrast  · No lymphadenopathy      CT neck with contrast on 6/8/2020 at [facility] documented:  · No acute abnormality    · Small cervical chain lymph nodes as well as small nodules in the parotid gland, favorable of recurrence given patient history of lymphoma. Recommend PET-CT. Otherwise, US-guided FNA or core biopsy of the parotid nodules could be considered. PET scan on 6/18/2020 documented:  · Mild asymmetrical uptake within the right posterior base of tongue along the left inguinal salivary gland with MAX SUV 4  · Mild FDG uptake within the intraparotid lymph nodes max SUV of 2.6 and the right and left intraparotid nodes  · Subcentimeter right posterior triangle lymph node has an SUV of 1.8  · New 1 cm superior segment left lower lobe nodule of the subpleural posterior medial surface with increased FDG uptake and SUV of 5.5, worrisome for malignancy. Lymphoma versus primary lung neoplasm considered. Given the small size and the positioning along the lateral margin of the descending thoracic aorta, percutaneous biopsy would be extremely difficult  · Stable borderline prominent size bilateral inguinal lymph nodes have mild FDG uptake with a maximum SUV of 2.5. · Right external iliac node has a maximum SUV of 2.5     I spoke to Dr. Brenda Soni with thoracic surgery department at [facility] on 7/1/2020. He saw Flaquita Long on 6/30/2020 with an in-depth and comprehensive consultation that is part of the record. Due to the fact that she has previously been radiated in the chest, the proximity of this lymph node or nodule to the aorta and the significant morbidity that may be involved with obtaining a surgical tissue diagnosis, recommendation is to have a follow-up CT scan in 6 months and hold off on intervention until there is more evidence of obvious growth or progression that unequivocally would require such an intervention for diagnosis. I believe this is reasonable. Additionally, if the needle biopsy of the parotid or if in ENT evaluation at [address] [place] [address], she is found to have a lymphoproliferative disorder that would require systemic chemotherapy, then that should as well potentially address the abnormality noted in the chest.  I will follow this closely along with Dr. Tolu Garcia as well. A follow-up CT scan of the chest will be performed in 1 ~December 2020 continued monitoring      5/18/2021 - CT CHEST W CONTRAST at [address], compared to 11/10/2020, documented:  · There is no significant interval change. · Nonmasslike density in the posterior medial left lower lobe adjacent and lateral to the descending thoracic aorta is unchanged and again measures 9.6 mm as in the previous study. · 2 adjacently located, well-defined, noncalcified nodules in the right lower lobe posteriorly are stable. These measure 4 mm and 5 mm in diameter. No change.    · No new noncalcified nodules are seen. #2  Right base of tongue nonspecific abnormality noted on PET scan at [facility] 1/3/2019, monitored by Dr. Cheyanne Cotto, ENT at  [facility]  S/P right complete parotidectomy by Dr. Cheyanne Cotto at [facility] on 7/24/2020 for the 1.7 cm right parotid mass    She saw Dr. Chriss Blanco and reports that a PET scan on 1/3/2019 at [facility] identifying an abnormality at the base of the tonguethroat area. Dr. Chriss Blanco referred her to Dr. Cheyanne Cotto - ENT at [facility] and she was seen in January of 2019 with a scope that did find a small abnormality in the right tongue region but this was too small to biopsy with a follow-up arrangement made for March 2019. She did not have a follow-up with Dr. Chriss Blanco. CT neck with contrast on 6/8/2020 at [facility] documented:  · No acute abnormality    · Small cervical chain lymph nodes as well as small nodules in the parotid gland, favorable of recurrence given patient history of lymphoma. Recommend PET-CT. Otherwise, US-guided FNA or core biopsy of the parotid nodules could be considered. Examination today (6/15/2020) does not reveal evidence of palpable peripheral lymphadenopathy in the head and neck area. Inspection and digital palpation of the base of tongue with extraction of the tongue does not disclose nodules or masses in the base of tongue or in the head and neck area that I can appreciate on exam.    Adam Hartman was evaluated by Dr. Cheyanne Cotto on 6/16/2020  for further ENT evaluation.     PET scan on 6/18/2020 documented:  · Mild asymmetrical uptake within the right posterior base of tongue along the left inguinal salivary gland with MAX SUV 4  · Mild FDG uptake within the intraparotid lymph nodes max SUV of 2.6 and the right and left intraparotid nodes  · Subcentimeter right posterior triangle lymph node has an SUV of 1.8  · New 1 cm superior segment left lower lobe nodule of the subpleural posterior medial surface with increased FDG uptake and SUV of 5.5, worrisome for malignancy. Lymphoma versus primary lung neoplasm considered. Given the small size and the positioning along the lateral margin of the descending thoracic aorta, percutaneous biopsy would be extremely difficult  · Stable borderline prominent size bilateral inguinal lymph nodes have mild FDG uptake with a maximum SUV of 2.5. · Right external iliac node has a maximum SUV of 2.5     US-guided FNA of the 1.7 cm right parotid mass was performed on 7/9/2020 at [facility]. Pathology revealed:  Parotid, right, FNA:   · Histiocytes and lymphocytes; negative for malignancy     Right complete parotidectomy was performed on 7/24/2020 by Dr. Nadia Smith at [facility]. Pathology revealed:  · Four lymph nodes with reactive follicular hyperplasia  · Findings consistent with a reactive process  · Increased reactive follicles with germinal centers throughout the lymph node  · The paracortical expansion with focal increase in histiocytes and Langerhans cells suggests mild dermatopathia. · No evidence of the patient's previously diagnosed Hodgkin lymphoma or other hematolymphoid  process. Correlation with clinical and other laboratory findings is advised.       Allergies:  Compazine [prochlorperazine] and Prochlorperazine edisylate    Medicines:  Current Outpatient Medications   Medication Sig Dispense Refill    pregabalin (LYRICA) 50 MG capsule       DULoxetine (CYMBALTA) 60 MG extended release capsule 30 mg Per patient is taken 30 mg      metoprolol tartrate (LOPRESSOR) 50 MG tablet Take 50 mg by mouth 2 times daily      glipiZIDE (GLUCOTROL) 5 MG tablet Take 10 mg by mouth 2 times daily (before meals)       albuterol sulfate HFA (PROAIR HFA) 108 (90 Base) MCG/ACT inhaler Inhale 2 puffs into the lungs every 6 hours as needed for Wheezing      Biotin 04268 MCG TBDP Take by mouth      cetirizine (ZYRTEC) 10 MG tablet Take 10 mg by mouth 2 times daily as needed for Allergies      metFORMIN (GLUCOPHAGE) 500 MG tablet Take 500 mg by mouth 2 times daily (with meals)       lansoprazole (PREVACID SOLUTAB) 15 MG disintegrating tablet Take 15 mg by mouth daily       SYNTHROID 125 MCG tablet Take 100 mcg by mouth daily       naproxen (NAPROSYN) 500 MG tablet Take 1 tablet by mouth 2 times daily (Patient not taking: Reported on 11/30/2021) 60 tablet 0     No current facility-administered medications for this visit. Past Medical History:      Diagnosis Date    Anxiety     Carpal tunnel syndrome of right wrist 10/5/2017    Depression     Diabetes (HCC)     borderline in the past    GERD (gastroesophageal reflux disease)     Hodgkin's lymphoma (HCC) 24 years ago    Tx with XRT    Hyperprolactinemia ([address]     Hypothyroidism     Multiple thyroid nodules     Nonalcoholic fatty liver disease     Tachycardia     S/P cardiac ablation 4/2016        Past Surgical History:      Procedure Laterality Date    ANKLE SURGERY      3 screws    BONE MARROW BIOPSY N/A 11/29/2017    BONE MARROW ASPIRATION BIOPSY performed by Juan Carlos Jauregui MD at [facility]      exploritory surgery for ganesh right side of neck    RI REVISE MEDIAN N/CARPAL TUNNEL SURG Right 10/5/2017    CARPAL TUNNEL RELEASE performed by Chery Chairez DO at [facility]      TUBAL LIGATION      TYMPANOSTOMY TUBE PLACEMENT          Family History:      Problem Relation Age of Onset    Breast Cancer Maternal Grandmother         Social History  Social History     Tobacco Use    Smoking status: Never Smoker    Smokeless tobacco: Never Used   Substance Use Topics    Alcohol use: Yes     Comment: occ    Drug use: No               Labs:  BMP: No results for input(s): NA, K, CL, CO2, PHOS, BUN, CREATININE, CALCIUM in the last 72 hours.   CBC:   Recent Labs     11/30/21  1015   WBC 16.13*   HGB 11.7   HCT 36.7   MCV 97.1*        LIVER PROFILE: No results for input(s): AST, ALT, LIPASE, BILIDIR, BILITOT, ALKPHOS in the last 72 hours. Invalid input(s): AMYLASE,  ALB  PT/INR: No results for input(s): PROTIME, INR in the last 72 hours. APTT: No results for input(s): APTT in the last 72 hours. BNP:  No results for input(s): BNP in the last 72 hours. Ionized Calcium:No results for input(s): IONCA in the last 72 hours. Magnesium:No results for input(s): MG in the last 72 hours. Phosphorus:No results for input(s): PHOS in the last 72 hours. HgbA1C: No results for input(s): LABA1C in the last 72 hours. Hepatic: No results for input(s): ALKPHOS, ALT, AST, PROT, BILITOT, BILIDIR, LABALBU in the last 72 hours. Lactic Acid: No results for input(s): LACTA in the last 72 hours. Troponin: No results for input(s): CKTOTAL, CKMB, TROPONINT in the last 72 hours. ABGs: No results for input(s): PH, PCO2, PO2, HCO3, O2SAT in the last 72 hours. CRP:  No results for input(s): CRP in the last 72 hours. Sed Rate:  No results for input(s): SEDRATE in the last 72 hours. Cultures:   No results for input(s): CULTURE in the last 72 hours. 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 Technologist obtained history includes shortness of breath and pain. COMPARISON: 5/26/2017, 1/10/2019 (from [facility]) DLP: 500 mGy cm. Automated exposure control was utilized to minimize patient radiation dose. TECHNIQUE: Enhanced  CT images of the chest obtained with multiplanar reformats. FINDINGS: AIRWAYS/PULMONARY PARENCHYMA: The central airways are midline and patent. Right lower lobe with a 6 mm and 4 mm solid pulmonary nodule on axial series 2, image 56. These are stable compared to 1/10/2019 and 5/26/2017. Minimal biapical scarring. No pleural effusion. No pneumothorax.  VASCULATURE: Thoracic aorta is normal in course and caliber. Mild calcified aortic atherosclerosis. Normal pulmonary artery caliber. No large central pulmonary artery filling defect on this non-CTA exam. CARDIAC:  Cardiac size is normal.  No pericardial effusion. No gross coronary calcifications. MEDIASTINUM: There is no mediastinal or hilar lymphadenopathy by CT size criteria. Esophagus has normal coarse, caliber and wall thickness. EXTRATHORACIC: The visualized portions of the thyroid gland are unremarkable. No thoracic inlet or axillary adenopathy. The extrathoracic soft tissues are within normal limits. INCLUDED UPPER ABDOMEN: Phase of contrast limits evaluation, but suspect fatty infiltration of the liver. Adrenal glands and pancreatic tail within normal limits. The spleen is absent and there are nodular soft tissue densities in the splenic bed, likely splenosis. These are stable compared to 5/26/2017. OSSEOUS: Stable sclerotic lesion in the C7 vertebral body compared to 5/26/2017. No acute or suspicious bony finding. 1. Stable right lower lobe pulmonary nodules measuring up to 6 mm compared to 5/26/2017. No additional follow-up recommended. 2. Suspect fatty infiltration of the liver, but evaluation is limited due to phase of contrast. 3. No lymphadenopathy. Signed by Dr Desirae Mixon on 7/17/2019 1:56 PM       ASSESSMENT AND PLAN:    #1    Seven Conti is a 46year-old  female managed with primary and secondary diagnoses as outlined:  · Stage IIA NS Hodgkin's disease in 1992. 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   Co-managed with Dr. Carlo Petres  · Tumor screening and health maintenance  · Right base of tongue nonspecific abnormality noted on PET scan at [facility] 1/3/2019, monitored by Dr. Meghan Avila, ENT at  [facility]  · S/P right complete parotidectomy by Dr. Meghan Avila at [facility] on 7/24/2020 for the 1.7 cm right parotid mass, path = reactive follicular hyperplasia, no evidence of Hodgkin's Elie Rachel is here for secondary tumor monitoring of a LLL pulmonary nodule, with repeat imaging prior to today's visit. Please see #3 below. Alma Delgado is without B symptoms. She has no new specific manifestations other than occasional left hip pain that comes and is significant and will be completely gone by the next day sometimes. Her rheumatologist, Dr. Dilip Parkinson is treating her for fibromyalgia. She has hydrocodone but does not like to take it unless she absolutely has to. She rarely takes it because many times the pain goes away on its own. Physical examination today, 11/30/2021 does not reveal evidence of palpable peripheral lymphadenopathy in the head and neck area, axilla supra or infraclavicular areas nor inguinal areas. The right total parotidectomy scar is barely visible in the preauricular area with excellent healing of the scar behind the right ear  Abdominal exam is without abdominal organomegaly or masses. Extremities no cyanosis clubbing or edema    CBC 7/1/2020 revealed a WBC of 19.23 with a hemoglobin of 13.7 and a platelet count of 857,855. CBC 8/11/2020 revealed a WBC of 14.40. Hgb is 12.4 with an MCV of 97.8 and platelet count of 083,005. CBC 11/3/2020 revealed a WBC of 20.02. Hgb is 12.3 with an MCV of 95.8 and platelet count of 915,976. CBC 11/24/2020 had a WBC of 18.1 with a hemoglobin of 13.2 and a platelet count of 680,548. CBC 5/26/2021 revealed a WBC of 17.98. Hgb is 12.5 with an MCV of 95.5 and platelet count of 803,664. CBC today 11/30/2021 reveals a WBC of 16.13 .  Hgb is 11.7 with an MCV of 97.1 and platelet count of of 2.6 and the right and left intraparotid nodes  · Subcentimeter right posterior triangle lymph node has an SUV of 1.8  · New 1 cm superior segment left lower lobe nodule of the subpleural posterior medial surface with increased FDG uptake and SUV of 5.5, worrisome for malignancy. Lymphoma versus primary lung neoplasm considered. Given the small size and the positioning along the lateral margin of the descending thoracic aorta, percutaneous biopsy would be extremely difficult  · Stable borderline prominent size bilateral inguinal lymph nodes have mild FDG uptake with a maximum SUV of 2.5. · Right external iliac node has a maximum SUV of 2.5     US-guided FNA of the 1.7 cm right parotid mass was performed on 7/9/2020 at [facility]. Pathology revealed:  Parotid, right, FNA:   · Histiocytes and lymphocytes; negative for malignancy     Right complete parotidectomy was performed on 7/24/2020 by Dr. Yury Mota at [facility]. Pathology revealed:  · Four lymph nodes with reactive follicular hyperplasia  · Findings consistent with a reactive process  · Increased reactive follicles with germinal centers throughout the lymph node  · The paracortical expansion with focal increase in histiocytes and Langerhans cells suggests mild dermatopathia. · No evidence of the patient's previously diagnosed Hodgkin lymphoma or other hematolymphoid  process. Correlation with clinical and other laboratory findings is advised. Patient was seen by Dr Juanita Kumar on 8/25/2020 at [facility]  for post-op follow-up from superficial parotidectomy 7/24/20. She is doing well postoperatively and will not require any additional follow-ups. #3.   Subcentimeter pulmonary nodules and new 1 cm superior segment left lower lobe nodule of the subpleural posterior medial surface with increased FDG uptake and SUV of 5.5, worrisome for malignancy.   Co-managed with Dr. Neva Pierre  developed chest pain and pressure and presented to the [facility] in [place] with a CTA chest on 1/10/2019 revealing 2 noncalcified nodules of the RLLlargest 6 mm. CT chest with contrast on 7/17/2019 documented:  · 6 mm RLL lung nodule, stable  · 4 mm RLL lung nodule, stable  · Suspect fatty infiltration of the liver, but evaluation is limited due to phase of contrast  · No lymphadenopathy      CT neck with contrast on 6/8/2020 at [facility] documented:  · No acute abnormality    · Small cervical chain lymph nodes as well as small nodules in the parotid gland, favorable of recurrence given patient history of lymphoma. Recommend PET-CT. Otherwise, US-guided FNA or core biopsy of the parotid nodules could be considered. PET scan on 6/18/2020 documented:  · Mild asymmetrical uptake within the right posterior base of tongue along the left inguinal salivary gland with MAX SUV 4  · Mild FDG uptake within the intraparotid lymph nodes max SUV of 2.6 and the right and left intraparotid nodes  · Subcentimeter right posterior triangle lymph node has an SUV of 1.8  · New 1 cm superior segment left lower lobe nodule of the subpleural posterior medial surface with increased FDG uptake and SUV of 5.5, worrisome for malignancy. Lymphoma versus primary lung neoplasm considered. Given the small size and the positioning along the lateral margin of the descending thoracic aorta, percutaneous biopsy would be extremely difficult  · Stable borderline prominent size bilateral inguinal lymph nodes have mild FDG uptake with a maximum SUV of 2.5. · Right external iliac node has a maximum SUV of 2.5     I spoke to Dr. Chris Perdue with thoracic surgery department at [facility] on 7/1/2020. He saw Patricia Hubbard on 6/30/2020 with an in-depth and comprehensive consultation that is part of the record.   Due to the fact that she has previously been radiated in the chest, the proximity of this lymph node or nodule to the aorta within the posterior medial superior segment left lower lobe abutting the medial mediastinum   · Subcentimeter groundglass nodules in the superior segment left lower lobe are unchanged compared to 5/18/2021  · No pathologic intrathoracic or axillary lymphadenopathy identified. Stable pulmonary nodules compared to the prior the 18th 2021   · Largest posteromedial subpleural superior segment left lower lobe nodule measures 1 cm abutting the medial pleural surface, stable compared to 5/18/2021  · No new or increasing size pulmonary nodules or pathologic intrathoracic lymphadenopathy    Imaging is stable to improved. As previously noted in 1992 at the time of staging laparotomy, Joanie Hunter had granulomatous changes in her spleen and on the surface of her liver. This is more than likely what is going on in her chest but continued monitoring will need to continue due to the fact that she had Hodgkin's disease and received radiation therapy. Yearly CTs will be sufficient for monitoring of the pulmonary nodules at this juncture. I will see her back in follow-up in 6 months for physical examination. #4  Abnormal Pap smear     She is followed at the [facility] [address] [facility] in [place] by nurse practitioners Dee Perea and Cliff Singh and she reports that a biopsy that was performed of her either cervix or uterus was performed with follow-up ultrasounds planned. #5.   Breast cancer tumor screening    She apparently had an abnormal mammogram of the right breast and is also under evaluation by Dr. Osito Brice in [facility]. This is where she has her routine breast cancer screening and will continue according to Joanie Hunter and her . She had a repeat mammogram and MRI of the breasts at [facility] but results are pending. She will continue following with her physicians in [place].     #6  GI tumor screening    EGD and colonoscopy with biopsies were performed in [facility]  on 12/18/2018 by Dr. Bronwyn Angelucci and revealed the following:  · Antral gastritis  · Grade 3 ulcerative erosive reflux esophagitis with sliding hiatal hernia  · Lower esophageal benign narrowing  · Polyp in the cecum  · Rest of colonic exam unremarkable   Pathology revealed the following:  Biopsy, antrum:  · Mild chronic gastritis  · No intestinal metaplasia or dyplasia  · H. 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 Hgb is 11.7 with an MCV of 97.1 and platelet count of 727,682. Continue conservative monitoring only necessary for this issue. #8  [id number] has not had an active COVID infection, nor has she had any vaccination. She is encouraged to get vaccinated however she is very fearful of the vaccine, to the point that she is willing to lose her job as a  at a healthcare facility which will probably happen this Friday because of her fear of the vaccination. This is unfortunate. Ananth El was seen today for follow-up. Diagnoses and all orders for this visit:    History of non-Hodgkin's lymphoma    Pulmonary nodules    Healthcare maintenance    History of splenectomy        No orders of the defined types were placed in this encounter. No orders of the defined types were placed in this encounter. Return in about 6 months (around 5/30/2022) for follow-up with .

## 2021-11-30 ENCOUNTER — HOSPITAL ENCOUNTER (OUTPATIENT)
Dept: INFUSION THERAPY | Age: 51
Discharge: HOME | End: 2021-11-30
Payer: COMMERCIAL

## 2021-11-30 ENCOUNTER — OFFICE VISIT (OUTPATIENT)
Dept: HEMATOLOGY | Age: 51
End: 2021-11-30
Payer: COMMERCIAL

## 2021-11-30 VITALS
BODY MASS INDEX: 33.65 KG/M2 | HEART RATE: 74 BPM | WEIGHT: 197.1 LBS | OXYGEN SATURATION: 98 % | DIASTOLIC BLOOD PRESSURE: 75 MMHG | HEIGHT: 64 IN | SYSTOLIC BLOOD PRESSURE: 120 MMHG

## 2021-11-30 DIAGNOSIS — R91.8 PULMONARY NODULES: ICD-10-CM

## 2021-11-30 DIAGNOSIS — Z90.81 HISTORY OF SPLENECTOMY: ICD-10-CM

## 2021-11-30 DIAGNOSIS — Z00.00 HEALTHCARE MAINTENANCE: ICD-10-CM

## 2021-11-30 DIAGNOSIS — Z85.72 HISTORY OF NON-HODGKIN'S LYMPHOMA: Primary | ICD-10-CM

## 2021-11-30 DIAGNOSIS — Z85.72 HISTORY OF NON-HODGKIN'S LYMPHOMA: ICD-10-CM

## 2021-11-30 LAB
BASOPHILS ABSOLUTE: 0.05 K/UL (ref 0.01–0.08)
BASOPHILS RELATIVE PERCENT: 0.3 % (ref 0.1–1.2)
EOSINOPHILS ABSOLUTE: 0.56 K/UL (ref 0.04–0.54)
EOSINOPHILS RELATIVE PERCENT: 3.5 % (ref 0.7–7)
HCT VFR BLD CALC: 36.7 % (ref 34.1–44.9)
HEMOGLOBIN: 11.7 G/DL (ref 11.2–15.7)
LYMPHOCYTES ABSOLUTE: 2.73 K/UL (ref 1.18–3.74)
LYMPHOCYTES RELATIVE PERCENT: 16.9 % (ref 19.3–53.1)
MCH RBC QN AUTO: 31 PG (ref 25.6–32.2)
MCHC RBC AUTO-ENTMCNC: 31.9 G/DL (ref 32.3–35.5)
MCV RBC AUTO: 97.1 FL (ref 79.4–94.8)
MONOCYTES ABSOLUTE: 1.91 K/UL (ref 0.24–0.82)
MONOCYTES RELATIVE PERCENT: 11.8 % (ref 4.7–12.5)
NEUTROPHILS ABSOLUTE: 10.88 K/UL (ref 1.56–6.13)
NEUTROPHILS RELATIVE PERCENT: 67.5 % (ref 34–71.1)
PDW BLD-RTO: 15.1 % (ref 11.7–14.4)
PLATELET # BLD: 343 K/UL (ref 182–369)
PMV BLD AUTO: 10 FL (ref 7.4–10.4)
RBC # BLD: 3.78 M/UL (ref 3.93–5.22)
WBC # BLD: 16.13 K/UL (ref 3.98–10.04)

## 2021-11-30 PROCEDURE — 36415 COLL VENOUS BLD VENIPUNCTURE: CPT

## 2021-11-30 PROCEDURE — 85025 COMPLETE CBC W/AUTO DIFF WBC: CPT

## 2021-11-30 PROCEDURE — 99214 OFFICE O/P EST MOD 30 MIN: CPT | Performed by: INTERNAL MEDICINE

## 2021-11-30 PROCEDURE — 99211 OFF/OP EST MAY X REQ PHY/QHP: CPT

## 2021-11-30 RX ORDER — PREGABALIN 50 MG/1
CAPSULE ORAL
COMMUNITY
Start: 2021-11-26

## 2022-05-24 NOTE — PROGRESS NOTES
Patient:  Romel Olivier  YOB: 1970  Date of Service: 6/20/2022  MRN: [id number]    Primary Care Physician: Jackie Griggs    Chief Complaint   Patient presents with    Follow-up     History of non-Hodgkin's lymphoma       Patient Seen, Chart, Consults notes, Labs, Radiology studies reviewed. Subjective:    Chace Maya is a 46year-old  female managed with primary and secondary diagnoses as outlined:  · Stage IIA NS Hodgkin's disease in 1992. 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 Co-managed with Dr. 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 Marge Clement, ENT at  [facility]  · S/P right complete parotidectomy by Dr. Marge Clement at [facility] on 7/24/2020 for the 1.7 cm right parotid mass, path = reactive follicular hyperplasia, no evidence of Hodgkin's lymphoma  · Tumor screening and health maintenance    Chace Maya is referred back to this clinic by her pulmonologist in [place] regarding abnormal lab work. Chace Maya is also monitored for a LLL pulmonary nodule. In review of systems she states that she has been having drenching night sweats over the past several weeks. Otherwise no other B symptoms. Marcelino Benjamin also has episodic left hip pain that comes and is significant and will be completely gone by the next day sometimes. Dr. Humble Castro, her rheumatologist is treating her for fibromyalgia. She has hydrocodone but does not like to take it unless she absolutely has to.   She rarely takes it because many times the pain goes away on its own. 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 Full serological workup was done including a CMP, LDH, B2M, serum protein electrophoresis and quantitative immunoglobulins, all which were normal.     A bone marrow biopsy and aspirate on 05/17/10 was performed and was virtually normal except for low iron stores. Cytogenetics were normal. Hematologically this is consistent with a post splenectomy leukocytosis and will subsequently be monitored conservatively. Bone marrow 11/29/2017  Normocellular bone marrow for age (~50-60%) with trilineage hematopoiesis, no significant dyspoiesis and ~1-2% blasts   Polyclonal plasmacytosis (~68% on  stain)   No stainable storage iron present; no increase in reticulin fibrosis   Negative FISH STUDY for BCR/ABL translocation   Negative molecular study for MPN profile including JAK2 [id number] point mutation   Normal female karyotype   No evidence of involvement of Hodgkin's lymphoma       TUMOR HISTORY: Stage IIA nodular sclerosing Hodgkins lymphoma diagnosed 06/11/92  Lupillo Cain was seen in initial oncology consultation on 6/26/1992 with a diagnosis of right supraclavicular nodular sclerosing Hodgkin's lymphoma for recommendations. Lupillo Cain presented  in her seventh month of her first pregnancy in the early part of 1992 with a palpable  right supraclavicular lymph node. After delivery on 05/11/92 she sought attention for this. A right supraclavicular lymph node biopsy on 06/11/92   Pathology revealed a nodular sclerosing Hodgkins disease. Disease involved that area as well as the mediastinum. A bone marrow biopsy and aspirate did not show any evidence of metastatic disease.      A staging laparotomy on 7/6/1992 that included lymph nodes biopsies and a splenectomy did not reveal any infradiaphragmatic malignant pathology. There were miliary granulomatous lesions noted throughout the spleen and on the surface of the liver, but no evidence of Hodgkins. She was referred to radiation therapy and was cared [facility] [place] [place]. He delivered approximately 3,960 cGy with a boost to the right neck. Her radiation therapy was in the mantle and periaortic lymph node fashion. She has been monitored conservatively since that time    TREATMENT SUMMARY:  1. XRT to the right neck, as noted above       SECONDARY TUMOR MONITORING AND SCREENING:    #1  Subcentimeter pulmonary nodules and new 1 cm superior segment left lower lobe nodule of the subpleural posterior medial surface with increased FDG uptake and SUV of 5.5, worrisome for malignancy. Co-managed with Dr. Leanora Cockayne  developed chest pain and pressure and presented to the [facility] in [place] with a CTA chest on 1/10/2019 revealing 2 noncalcified nodules of the RLLlargest 6 mm. CT chest with contrast on 7/17/2019 documented:  · 6 mm RLL lung nodule, stable  · 4 mm RLL lung nodule, stable  · Suspect fatty infiltration of the liver, but evaluation is limited due to phase of contrast  · No lymphadenopathy      CT neck with contrast on 6/8/2020 at [facility] documented:  · No acute abnormality    · Small cervical chain lymph nodes as well as small nodules in the parotid gland, favorable of recurrence given patient history of lymphoma. Recommend PET-CT. Otherwise, US-guided FNA or core biopsy of the parotid nodules could be considered.      PET scan on 6/18/2020 documented:  · Mild asymmetrical uptake within the right posterior base of tongue along the left inguinal salivary gland with MAX SUV 4  · Mild FDG uptake within the intraparotid lymph nodes max SUV of 2.6 and the right and left intraparotid nodes  · Subcentimeter right posterior triangle lymph node has an SUV of 1.8  · New 1 cm superior segment left lower lobe nodule of the subpleural posterior medial surface with increased FDG uptake and SUV of 5.5, worrisome for malignancy. Lymphoma versus primary lung neoplasm considered. Given the small size and the positioning along the lateral margin of the descending thoracic aorta, percutaneous biopsy would be extremely difficult  · Stable borderline prominent size bilateral inguinal lymph nodes have mild FDG uptake with a maximum SUV of 2.5. · Right external iliac node has a maximum SUV of 2.5     I spoke to Dr. Segundo Dennis with thoracic surgery department at [facility] on 7/1/2020. He saw Ronna Carmona on 6/30/2020 with an in-depth and comprehensive consultation that is part of the record. Due to the fact that she has previously been radiated in the chest, the proximity of this lymph node or nodule to the aorta and the significant morbidity that may be involved with obtaining a surgical tissue diagnosis, recommendation is to have a follow-up CT scan in 6 months and hold off on intervention until there is more evidence of obvious growth or progression that unequivocally would require such an intervention for diagnosis. I believe this is reasonable. Additionally, if the needle biopsy of the parotid or if in ENT evaluation at [address] [place] [address], she is found to have a lymphoproliferative disorder that would require systemic chemotherapy, then that should as well potentially address the abnormality noted in the chest.  I will follow this closely along with Dr. Ford Schrader as well. A follow-up CT scan of the chest will be performed in 1 ~December 2020 continued monitoring      5/18/2021 - CT CHEST W CONTRAST at [facility], compared to 11/10/2020, documented:  · There is no significant interval change.   · Nonmasslike density in the posterior medial left lower lobe adjacent and lateral to the descending thoracic aorta is unchanged and again measures 9.6 mm as in the previous study. · 2 adjacently located, well-defined, noncalcified nodules in the right lower lobe posteriorly are stable. These measure 4 mm and 5 mm in diameter. No change. · No new noncalcified nodules are seen. #2  Right base of tongue nonspecific abnormality noted on PET scan at [address] [place] [address] 1/3/2019, monitored by Dr. Darion Chavez, ENT at  [address] [place] [address]  S/P right complete parotidectomy by Dr. Darion Chavez at [address] [place] [address] on 7/24/2020 for the 1.7 cm right parotid mass    She saw Dr. Cortes Ibarra and reports that a PET scan on 1/3/2019 at [address] [place] [address] identifying an abnormality at the base of the tonguethroat area. Dr. Cortes Ibarra referred her to Dr. Darion Chavez - ENT at [address] [place] [address] and she was seen in January of 2019 with a scope that did find a small abnormality in the right tongue region but this was too small to biopsy with a follow-up arrangement made for March 2019. She did not have a follow-up with Dr. Cortes Ibarra. CT neck with contrast on 6/8/2020 at [facility] documented:  · No acute abnormality    · Small cervical chain lymph nodes as well as small nodules in the parotid gland, favorable of recurrence given patient history of lymphoma. Recommend PET-CT. Otherwise, US-guided FNA or core biopsy of the parotid nodules could be considered. Examination today (6/15/2020) does not reveal evidence of palpable peripheral lymphadenopathy in the head and neck area. Inspection and digital palpation of the base of tongue with extraction of the tongue does not disclose nodules or masses in the base of tongue or in the head and neck area that I can appreciate on exam.    Mora Schroeder was evaluated by Dr. Darion Chavez on 6/16/2020  for further ENT evaluation.     PET scan on 6/18/2020 documented:  · Mild asymmetrical uptake within the right posterior base of tongue along the left inguinal salivary gland with MAX SUV 4  · Mild FDG uptake within the intraparotid lymph nodes max SUV of 2.6 and the right and left intraparotid nodes  · Subcentimeter right posterior triangle lymph node has an SUV of 1.8  · New 1 cm superior segment left lower lobe nodule of the subpleural posterior medial surface with increased FDG uptake and SUV of 5.5, worrisome for malignancy. Lymphoma versus primary lung neoplasm considered. Given the small size and the positioning along the lateral margin of the descending thoracic aorta, percutaneous biopsy would be extremely difficult  · Stable borderline prominent size bilateral inguinal lymph nodes have mild FDG uptake with a maximum SUV of 2.5. · Right external iliac node has a maximum SUV of 2.5     US-guided FNA of the 1.7 cm right parotid mass was performed on 7/9/2020 at [facility]. Pathology revealed:  Parotid, right, FNA:   · Histiocytes and lymphocytes; negative for malignancy     Right complete parotidectomy was performed on 7/24/2020 by Dr. Boris guzman at [facility]. Pathology revealed:  · Four lymph nodes with reactive follicular hyperplasia  · Findings consistent with a reactive process  · Increased reactive follicles with germinal centers throughout the lymph node  · The paracortical expansion with focal increase in histiocytes and Langerhans cells suggests mild dermatopathia. · No evidence of the patient's previously diagnosed Hodgkin lymphoma or other hematolymphoid  process. Correlation with clinical and other laboratory findings is advised.       Allergies:  Compazine [prochlorperazine] and Prochlorperazine edisylate    Medicines:  Current Outpatient Medications   Medication Sig Dispense Refill    estradiol (ESTRACE) 0.1 MG/GM vaginal cream USE AS DIRECTED 1 GRAM THREE TIMES PER WEEK      pregabalin (LYRICA) 50 MG capsule       DULoxetine (CYMBALTA) 60 MG extended release capsule 30 mg Per patient is taken 30 mg      metoprolol tartrate (LOPRESSOR) 50 MG tablet Take 50 mg by mouth 2 times daily      glipiZIDE (GLUCOTROL) 5 MG tablet Take 10 mg by mouth 2 times daily (before meals)       albuterol sulfate HFA (PROAIR HFA) 108 (90 Base) MCG/ACT inhaler Inhale 2 puffs into the lungs every 6 hours as needed for Wheezing      Biotin 89205 MCG TBDP Take by mouth      cetirizine (ZYRTEC) 10 MG tablet Take 10 mg by mouth 2 times daily as needed for Allergies      metFORMIN (GLUCOPHAGE) 500 MG tablet Take 500 mg by mouth 2 times daily (with meals)       lansoprazole (PREVACID SOLUTAB) 15 MG disintegrating tablet Take 15 mg by mouth daily       SYNTHROID 125 MCG tablet Take 100 mcg by mouth daily       naproxen (NAPROSYN) 500 MG tablet Take 1 tablet by mouth 2 times daily (Patient not taking: Reported on 11/30/2021) 60 tablet 0     No current facility-administered medications for this visit.        Past Medical History:      Diagnosis Date    Anxiety     Carpal tunnel syndrome of right wrist 10/5/2017    Depression     Diabetes (HCC)     borderline in the past    GERD (gastroesophageal reflux disease)     Hodgkin's lymphoma (HCC) 24 years ago    Tx with XRT    Hyperprolactinemia ([facility] [address]     Hypothyroidism     Multiple thyroid nodules     Nonalcoholic fatty liver disease     Tachycardia     S/P cardiac ablation 4/2016        Past Surgical History:      Procedure Laterality Date    ANKLE SURGERY      3 screws    BONE MARROW BIOPSY N/A 11/29/2017    BONE MARROW ASPIRATION BIOPSY performed by Crystal Watkins [facility] at [facility]      exploritory surgery for ganesh right side of neck    PA REVISE MEDIAN N/CARPAL TUNNEL SURG Right 10/5/2017    CARPAL TUNNEL RELEASE performed by Daisy Vigil DO at [facility]      TYMPANOSTOMY TUBE PLACEMENT          Family History:      Problem Relation Age of Onset    Breast Cancer Maternal Grandmother         Social History  Social History     Tobacco Use    Smoking status: Never Smoker    Smokeless tobacco: Never Used   Substance Use Topics    Alcohol use: Yes     Comment: occ    Drug use: No               Labs:  BMP: No results for input(s): NA, K, CL, CO2, PHOS, BUN, CREATININE, CALCIUM in the last 72 hours. CBC:   Recent Labs     06/20/22  0947   WBC 16.54*   HGB 12.8   HCT 40.5   MCV 94.0   *     LIVER PROFILE: No results for input(s): AST, ALT, LIPASE, BILIDIR, BILITOT, ALKPHOS in the last 72 hours. Invalid input(s): AMYLASE,  ALB  PT/INR: No results for input(s): PROTIME, INR in the last 72 hours. APTT: No results for input(s): APTT in the last 72 hours. BNP:  No results for input(s): BNP in the last 72 hours. Ionized Calcium:No results for input(s): IONCA in the last 72 hours. Magnesium:No results for input(s): MG in the last 72 hours. Phosphorus:No results for input(s): PHOS in the last 72 hours. HgbA1C: No results for input(s): LABA1C in the last 72 hours. Hepatic: No results for input(s): ALKPHOS, ALT, AST, PROT, BILITOT, BILIDIR, LABALBU in the last 72 hours. Lactic Acid: No results for input(s): LACTA in the last 72 hours. Troponin: No results for input(s): CKTOTAL, CKMB, TROPONINT in the last 72 hours. ABGs: No results for input(s): PH, PCO2, PO2, HCO3, O2SAT in the last 72 hours. CRP:  No results for input(s): CRP in the last 72 hours. Sed Rate:  No results for input(s): SEDRATE in the last 72 hours. Cultures:   No results for input(s): CULTURE in the last 72 hours. ASSESSMENT AND PLAN:    #1    Monique Ness is a 46year-old  female managed with primary and secondary diagnoses as outlined:  · Stage IIA NS Hodgkin's disease in 1992.    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 Co-managed with Dr. 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 [employer], ENT at  [address] [place] [address]  · S/P right complete parotidectomy by Dr. [employer] at [address] [place] [address] on 7/24/2020 for the 1.7 cm right parotid mass, path = reactive follicular hyperplasia, no evidence of Hodgkin's lymphoma  · Tumor screening and health maintenance    Lavon Sanchez is referred back to this clinic by her pulmonologist in [place] regarding abnormal lab work. Lavon Sanchez is also monitored for a LLL pulmonary nodule. In review of systems she states that she has been having drenching night sweats over the past several weeks. Otherwise no other B symptoms. Laura Otero also has episodic left hip pain that comes and is significant and will be completely gone by the next day sometimes. Dr. Willean Prader, her rheumatologist is treating her for fibromyalgia. She has hydrocodone but does not like to take it unless she absolutely has to. She rarely takes it because many times the pain goes away on its own. Physical examination today, 6/20/2022 does not reveal evidence of palpable peripheral lymphadenopathy in the head and neck area, axilla supra or infraclavicular areas nor inguinal areas. The right total parotidectomy scar is barely visible in the preauricular area with excellent healing of the scar behind the right ear  Abdominal exam is without abdominal organomegaly or masses. Extremities no cyanosis clubbing or edema    CBC 7/1/2020 revealed a WBC of 19.23 with a hemoglobin of 13.7 and a platelet count of 272,429. CBC 8/11/2020 revealed a WBC of 14.40. Hgb is 12.4 with an MCV of 97.8 and platelet count of 069,224. CBC 11/3/2020 revealed a WBC of 20.02. Hgb is 12.3 with an MCV of 95.8 and platelet count of 019,409.      CBC 11/24/2020 had a WBC of 18.1 with a hemoglobin of 13.2 and a platelet count of 566,866. CBC 5/26/2021 revealed a WBC of 17.98. Hgb is 12.5 with an MCV of 95.5 and platelet count of 338,374. CBC 11/30/2021 revealed a WBC of 16.13 . Hgb is 11.7 with an MCV of 97.1 and platelet count of 993,962. CBC today 6/20/2022  reveals a WBC of 16.54 Hgb is 12.8 with an MCV of 94.0 and platelet count of 134,044. I am unable to appreciate palpable peripheral lymphadenopathy nor abnormalities in the abdomen. Her lungs are remarkably clear. Examination is unremarkable for new developments to suggest recurrent or new disease. I am unable to appreciate evidence of Hodgkin's lymphoma on examination of peripheral lymph node sites. This however does not rule out the possibility of occult lymphadenopathy in the chest or abdomen. She states that she is having drenching night sweats and therefore imaging studies are being requested. Additionally, she has a chronically elevated protein level >9.0 that has previously been worked up. Her PCP, Dr. Kartik Jerez ran lab work documenting an elevated protein of 9.2 with polyclonal immunoglobulins and an elevated IgG of 4102 and elevated IgA of 702. The SPEP documented NO M  spike. Please see #8 below. #2.  Right base of tongue nonspecific abnormality noted on PET scan at [facility] 1/3/2019, monitored by Dr. Yasmin Herrera, ENT at  [facility]  S/P right complete parotidectomy by Dr. Yasmin Herrera at [facility] on 7/24/2020 for the 1.7 cm right parotid mass    She saw Dr. Tristan Vaz and reports that a PET scan on 1/3/2019 at [facility] identifying an abnormality at the base of the tonguethroat area. Dr. Tristan Vaz referred her to Dr. Yasmin Herrera - ENT at [facility] and she was seen in January of 2019 with a scope that did find a small abnormality in the right tongue region but this was too small to biopsy with a follow-up arrangement made for March 2019. She did not have a follow-up with Dr. Tristan Vaz.     CT neck with contrast on 6/8/2020 at [facility] documented:  · No acute abnormality    · Small cervical chain lymph nodes as well as small nodules in the parotid gland, favorable of recurrence given patient history of lymphoma. Recommend PET-CT. Otherwise, US-guided FNA or core biopsy of the parotid nodules could be considered. Examination today (6/20/2022) does not reveal evidence of palpable peripheral lymphadenopathy in the head and neck area. Inspection and digital palpation of the base of tongue with extraction of the tongue does not disclose nodules or masses in the base of tongue or in the head and neck area that I can appreciate on exam.    Monique Ness was evaluated by Dr. Griselda Canton on 6/16/2020  for further ENT evaluation. PET scan on 6/18/2020 documented:  · Mild asymmetrical uptake within the right posterior base of tongue along the left inguinal salivary gland with MAX SUV 4  · Mild FDG uptake within the intraparotid lymph nodes max SUV of 2.6 and the right and left intraparotid nodes  · Subcentimeter right posterior triangle lymph node has an SUV of 1.8  · New 1 cm superior segment left lower lobe nodule of the subpleural posterior medial surface with increased FDG uptake and SUV of 5.5, worrisome for malignancy. Lymphoma versus primary lung neoplasm considered. Given the small size and the positioning along the lateral margin of the descending thoracic aorta, percutaneous biopsy would be extremely difficult  · Stable borderline prominent size bilateral inguinal lymph nodes have mild FDG uptake with a maximum SUV of 2.5. · Right external iliac node has a maximum SUV of 2.5     US-guided FNA of the 1.7 cm right parotid mass was performed on 7/9/2020 at [facility]. Pathology revealed:  Parotid, right, FNA:   · Histiocytes and lymphocytes; negative for malignancy     Right complete parotidectomy was performed on 7/24/2020 by Dr. Griselda Canton at [facility].    Pathology revealed:  · Four lymph nodes with reactive follicular hyperplasia  · Findings consistent with a reactive process  · Increased reactive follicles with germinal centers throughout the lymph node  · The paracortical expansion with focal increase in histiocytes and Langerhans cells suggests mild dermatopathia. · No evidence of the patient's previously diagnosed Hodgkin lymphoma or other hematolymphoid  process. Correlation with clinical and other laboratory findings is advised. Patient was seen by Dr Srinivasa Fernandez on 8/25/2020 at [facility]  for post-op follow-up from superficial parotidectomy 7/24/20. She is doing well postoperatively and will not require any additional follow-ups. #3.   Subcentimeter pulmonary nodules and new 1 cm superior segment left lower lobe nodule of the subpleural posterior medial surface with increased FDG uptake and SUV of 5.5, worrisome for malignancy. Co-managed with Dr. Kathy Martinez  developed chest pain and pressure and presented to the [facility] in [place] with a CTA chest on 1/10/2019 revealing 2 noncalcified nodules of the RLLlargest 6 mm. CT chest with contrast on 7/17/2019 documented:  · 6 mm RLL lung nodule, stable  · 4 mm RLL lung nodule, stable  · Suspect fatty infiltration of the liver, but evaluation is limited due to phase of contrast  · No lymphadenopathy      CT neck with contrast on 6/8/2020 at [facility] documented:  · No acute abnormality    · Small cervical chain lymph nodes as well as small nodules in the parotid gland, favorable of recurrence given patient history of lymphoma. Recommend PET-CT. Otherwise, US-guided FNA or core biopsy of the parotid nodules could be considered.      PET scan on 6/18/2020 documented:  · Mild asymmetrical uptake within the right posterior base of tongue along the left inguinal salivary gland with MAX SUV 4  · Mild FDG uptake within the intraparotid lymph nodes max SUV of 2.6 and the right and left intraparotid nodes  · Subcentimeter right posterior triangle lymph node has an SUV of 1.8  · New 1 cm superior segment left lower lobe nodule of the subpleural posterior medial surface with increased FDG uptake and SUV of 5.5, worrisome for malignancy. Lymphoma versus primary lung neoplasm considered. Given the small size and the positioning along the lateral margin of the descending thoracic aorta, percutaneous biopsy would be extremely difficult  · Stable borderline prominent size bilateral inguinal lymph nodes have mild FDG uptake with a maximum SUV of 2.5. · Right external iliac node has a maximum SUV of 2.5     I spoke to Dr. Micaela Tom with thoracic surgery department at [facility] on 7/1/2020. He saw Ayo Emmanuel on 6/30/2020 with an in-depth and comprehensive consultation that is part of the record. Due to the fact that she has previously been radiated in the chest, the proximity of this lymph node or nodule to the aorta and the significant morbidity that may be involved with obtaining a surgical tissue diagnosis, recommendation is to have a follow-up CT scan in 6 months and hold off on intervention until there is more evidence of obvious growth or progression that unequivocally would require such an intervention for diagnosis. I believe this is reasonable. Additionally, if the needle biopsy of the parotid or if in ENT evaluation at [facility], she is found to have a lymphoproliferative disorder that would require systemic chemotherapy, then that should as well potentially address the abnormality noted in the chest.  I will follow this closely along with Dr. Raya Wilson as well. CXR 11/3/2020 documented:  · No acute cardiopulmonary abnormality. · 1 cm left lower lobe nodule seen on previous PET CT is not clearly demonstrated. Repeat CT would be much more sensitive    A follow-up CT scan of the chest will be performed for continued monitoring.   I will see back in follow-up in 3 weeks.    CT CHEST on 11/10/2020 at [facility] ([facility])  compared to 6/18/2020 documented:  · 1 cm, stable, medial left lower lobe pulmonary nodule. · No lymphadenopathy. · Diffuse low-density of the liver, favored to be due to fatty liver. CT CHEST W CONTRAST on 5/18/2021 - at [facility], compared to 11/10/2020, documented:  · There is no significant interval change. · Nonmasslike density in the posterior medial left lower lobe adjacent and lateral to the descending thoracic aorta is unchanged and again measures 9.6 mm as in the previous study. · 4 mm and 5 mm adjacently located, well-defined, noncalcified nodules in the right lower lobe posteriorly are stable with no change. · No new noncalcified nodules are seen. CT Chest  W Contrast 10/25/2021 :COMPARISON: 10/19/2021, 5/18/2021, 7/17/2019  · 6 mm and 5 mm adjacent right lower lobe pulmonary nodules, stable for over 2 years  · 10 x 7 mm nodular density with associated air bronchograms stable within the posterior medial superior segment left lower lobe abutting the medial mediastinum   · Subcentimeter groundglass nodules in the superior segment left lower lobe are unchanged compared to 5/18/2021  · No pathologic intrathoracic or axillary lymphadenopathy identified. Stable pulmonary nodules compared to the prior the 18th 2021   · Largest posteromedial subpleural superior segment left lower lobe nodule measures 1 cm abutting the medial pleural surface, stable compared to 5/18/2021  · No new or increasing size pulmonary nodules or pathologic intrathoracic lymphadenopathy    Imaging is stable to improved. As previously noted in 1992 at the time of staging laparotomy, Amy Chavez had granulomatous changes in her spleen and on the surface of her liver. This is more than likely what is going on in her chest but continued monitoring will need to continue due to the fact that she had Hodgkin's disease and received radiation therapy.     Yearly CTs will be sufficient for monitoring of the pulmonary nodules at this juncture. I will see her back in follow-up in 6 months for physical examination. #4  GYN:  Pap Smear on 10/20/2021 was negative for malignancy      #5. Breast cancer tumor screening    She apparently had an abnormal mammogram of the right breast and is also under evaluation by Dr. Katerina Medina in [facility]. This is where she has her routine breast cancer screening and will continue according to Marlene and her . She had a repeat mammogram and MRI of the breasts at [facility] but results are pending. She will continue following with her physicians in [place]. #6  GI tumor screening    EGD and colonoscopy with biopsies were performed in [facility]  on 12/18/2018 by Dr. Milagros Leblanc and revealed the following:  · Antral gastritis  · Grade 3 ulcerative erosive reflux esophagitis with sliding hiatal hernia  · Lower esophageal benign narrowing  · Polyp in the cecum  · Rest of colonic exam unremarkable   Pathology revealed the following:  Biopsy, antrum:  · Mild chronic gastritis  · No intestinal metaplasia or dyplasia  · H. 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 Continue conservative monitoring only necessary for this issue. #8  Polyclonal plasmacytosis    Marlene was evaluated in May of 2010 in a routine follow up for leukocytosis with a white count of 16,200 and 55.6% lymphocytes. Full serological workup was done including a CMP, LDH, B2M, serum protein electrophoresis and quantitative immunoglobulins, all which were normal.     A bone marrow biopsy and aspirate on 05/17/10 was performed and was virtually normal except for low iron stores.  Cytogenetics were normal. Hematologically this is consistent with a post splenectomy leukocytosis and will subsequently be monitored conservatively. Bone marrow 11/29/2017  Normocellular bone marrow for age (~50-60%) with trilineage hematopoiesis, no significant dyspoiesis and ~1-2% blasts   Polyclonal plasmacytosis (~68% on  stain)   No stainable storage iron present; no increase in reticulin fibrosis   Negative FISH STUDY for BCR/ABL translocation   Negative molecular study for MPN profile including JAK2 [id number] point mutation   Normal female karyotype   No evidence of involvement of Hodgkin's Kayli Kulkarni has a chronically elevated protein level >9.0 that has previously been worked up. Her PCP, Dr. Rossi Chapman ran lab work documenting an elevated protein of 9.2 with polyclonal immunoglobulins and an elevated IgG of 4102 and elevated IgA of 702. The SPEP documented NO M  spike. Repeat serology is being requested today including SPEP, QI, LDH, B2M, kappa lambda light chains and CMP   CT scans of the neck chest abdomen pelvis are also being requested with a follow-up in 2 weeks to review all results          #9  Immunizations    There is no immunization history on file for this patient. Catherine Dunaway has not had an active COVID infection, nor has she had any vaccination. She is encouraged to get vaccinated however she is very fearful of the vaccine, to the point that she is willing to lose her job as a  at a healthcare facility which will probably happen this Friday because of her fear of the vaccination. This is unfortunate. Catherine Dunaway was seen today for follow-up. Diagnoses and all orders for this visit:    History of non-Hodgkin's lymphoma  -     Beta 2 Microglobulin, Serum; Future  -     Beta 2 Microglobulin, Serum; Future  -     Electrophoresis Protein, Serum; Future  -     Immunoglobulins, Quantitative; Future  -     Kappa/Lambda Quantitative Free Light Chains, Serum; Future  -     Comprehensive Metabolic Panel; Future  -     CT SOFT TISSUE NECK W CONTRAST;  Future  -     CT CHEST W 7/4/2022) for Follow Up with [facility].

## 2022-06-20 ENCOUNTER — HOSPITAL ENCOUNTER (OUTPATIENT)
Dept: INFUSION THERAPY | Age: 52
Discharge: HOME | End: 2022-06-20
Payer: COMMERCIAL

## 2022-06-20 ENCOUNTER — OFFICE VISIT (OUTPATIENT)
Dept: HEMATOLOGY | Age: 52
End: 2022-06-20
Payer: COMMERCIAL

## 2022-06-20 VITALS
HEIGHT: 64 IN | BODY MASS INDEX: 33.19 KG/M2 | OXYGEN SATURATION: 97 % | DIASTOLIC BLOOD PRESSURE: 70 MMHG | HEART RATE: 105 BPM | WEIGHT: 194.4 LBS | SYSTOLIC BLOOD PRESSURE: 152 MMHG

## 2022-06-20 DIAGNOSIS — Z85.72 HISTORY OF NON-HODGKIN'S LYMPHOMA: ICD-10-CM

## 2022-06-20 DIAGNOSIS — R91.8 PULMONARY NODULES: ICD-10-CM

## 2022-06-20 DIAGNOSIS — Z00.00 HEALTHCARE MAINTENANCE: ICD-10-CM

## 2022-06-20 DIAGNOSIS — Z85.72 HISTORY OF NON-HODGKIN'S LYMPHOMA: Primary | ICD-10-CM

## 2022-06-20 LAB
ALBUMIN SERPL-MCNC: 3.9 G/DL (ref 3.5–5.2)
ALP BLD-CCNC: 145 U/L (ref 35–104)
ALT SERPL-CCNC: 42 U/L (ref 9–52)
ANION GAP SERPL CALCULATED.3IONS-SCNC: 8 MMOL/L (ref 7–19)
AST SERPL-CCNC: 44 U/L (ref 14–36)
BASOPHILS ABSOLUTE: 0.09 K/UL (ref 0.01–0.08)
BASOPHILS RELATIVE PERCENT: 0.5 % (ref 0.1–1.2)
BILIRUB SERPL-MCNC: 0.6 MG/DL (ref 0.2–1.3)
BUN BLDV-MCNC: 10 MG/DL (ref 7–17)
CALCIUM SERPL-MCNC: 8.6 MG/DL (ref 8.4–10.2)
CHLORIDE BLD-SCNC: 102 MMOL/L (ref 98–111)
CO2: 28 MMOL/L (ref 22–29)
CREAT SERPL-MCNC: 0.8 MG/DL (ref 0.5–1)
EOSINOPHILS ABSOLUTE: 0.62 K/UL (ref 0.04–0.54)
EOSINOPHILS RELATIVE PERCENT: 3.7 % (ref 0.7–7)
GFR NON-AFRICAN AMERICAN: >60
GLOBULIN: 6 G/DL
GLUCOSE BLD-MCNC: 204 MG/DL (ref 74–106)
HCT VFR BLD CALC: 40.5 % (ref 34.1–44.9)
HEMOGLOBIN: 12.8 G/DL (ref 11.2–15.7)
LYMPHOCYTES ABSOLUTE: 3.86 K/UL (ref 1.18–3.74)
LYMPHOCYTES RELATIVE PERCENT: 23.3 % (ref 19.3–53.1)
MCH RBC QN AUTO: 29.7 PG (ref 25.6–32.2)
MCHC RBC AUTO-ENTMCNC: 31.6 G/DL (ref 32.3–35.5)
MCV RBC AUTO: 94 FL (ref 79.4–94.8)
MONOCYTES ABSOLUTE: 2.32 K/UL (ref 0.24–0.82)
MONOCYTES RELATIVE PERCENT: 14 % (ref 4.7–12.5)
NEUTROPHILS ABSOLUTE: 9.58 K/UL (ref 1.56–6.13)
NEUTROPHILS RELATIVE PERCENT: 58.1 % (ref 34–71.1)
PDW BLD-RTO: 14.7 % (ref 11.7–14.4)
PLATELET # BLD: 447 K/UL (ref 182–369)
PMV BLD AUTO: 10.6 FL (ref 7.4–10.4)
POTASSIUM SERPL-SCNC: 3.7 MMOL/L (ref 3.5–5.1)
RBC # BLD: 4.31 M/UL (ref 3.93–5.22)
SODIUM BLD-SCNC: 138 MMOL/L (ref 137–145)
TOTAL PROTEIN: 9.9 G/DL (ref 6.3–8.2)
WBC # BLD: 16.54 K/UL (ref 3.98–10.04)

## 2022-06-20 PROCEDURE — 99214 OFFICE O/P EST MOD 30 MIN: CPT | Performed by: INTERNAL MEDICINE

## 2022-06-20 PROCEDURE — 80053 COMPREHEN METABOLIC PANEL: CPT

## 2022-06-20 PROCEDURE — 36415 COLL VENOUS BLD VENIPUNCTURE: CPT

## 2022-06-20 PROCEDURE — 99213 OFFICE O/P EST LOW 20 MIN: CPT

## 2022-06-20 PROCEDURE — 85025 COMPLETE CBC W/AUTO DIFF WBC: CPT

## 2022-06-20 RX ORDER — ESTRADIOL 0.1 MG/G
CREAM VAGINAL
COMMUNITY
Start: 2022-06-06

## 2022-06-23 LAB — BETA-2 MICROGLOBULIN: 2.5 MG/L (ref 0.8–2.4)

## 2022-06-24 LAB
+IMM: ABNORMAL
ALBUMIN SERPL-MCNC: 3.21 G/DL (ref 3.75–5.01)
ALPHA-1-GLOBULIN: 0.34 G/DL (ref 0.19–0.46)
ALPHA-2-GLOBULIN: 1.05 G/DL (ref 0.48–1.05)
BETA GLOBULIN: 1.15 G/DL (ref 0.48–1.1)
GAMMA GLOBULIN: 3.86 G/DL (ref 0.62–1.51)
IGA: 783 MG/DL (ref 68–408)
IGG: 3951 MG/DL (ref 768–1632)
IGM: 85 MG/DL (ref 35–263)
KAPPA FREE LIGHT CHAINS QNT: 166.2 MG/L (ref 3.3–19.4)
KAPPA/LAMBDA FREE LIGHT CHAIN RATIO: 1.4 (ref 0.26–1.65)
LAMBDA FREE LIGHT CHAINS QNT: 118.34 MG/L (ref 5.71–26.3)
SPE/IFE INTERPRETATION: ABNORMAL
TOTAL PROTEIN: 9.6 G/DL (ref 6.3–8.2)

## 2022-06-29 ENCOUNTER — HOSPITAL ENCOUNTER (OUTPATIENT)
Dept: CT IMAGING | Age: 52
Discharge: HOME | End: 2022-06-29
Payer: COMMERCIAL

## 2022-06-29 DIAGNOSIS — Z85.72 HISTORY OF NON-HODGKIN'S LYMPHOMA: ICD-10-CM

## 2022-06-29 DIAGNOSIS — R91.8 PULMONARY NODULES: ICD-10-CM

## 2022-06-29 PROCEDURE — 74177 CT ABD & PELVIS W/CONTRAST: CPT

## 2022-06-29 PROCEDURE — 70491 CT SOFT TISSUE NECK W/DYE: CPT | Performed by: RADIOLOGY

## 2022-06-29 PROCEDURE — 74177 CT ABD & PELVIS W/CONTRAST: CPT | Performed by: RADIOLOGY

## 2022-06-29 PROCEDURE — 71260 CT THORAX DX C+: CPT | Performed by: RADIOLOGY

## 2022-06-29 PROCEDURE — 6360000004 HC RX CONTRAST MEDICATION: Performed by: INTERNAL MEDICINE

## 2022-06-29 PROCEDURE — 71260 CT THORAX DX C+: CPT

## 2022-06-29 PROCEDURE — 70491 CT SOFT TISSUE NECK W/DYE: CPT

## 2022-06-29 RX ADMIN — IOPAMIDOL 100 ML: 755 INJECTION, SOLUTION INTRAVENOUS at 09:22

## 2022-06-29 NOTE — PROGRESS NOTES
Patient:  Surinder Hood  YOB: 1970  Date of Service: 6/30/2022  MRN: [id number]    Primary Care Physician: Meg Farris    Chief Complaint   Patient presents with    Follow-up     History of non-Hodgkin's lymphoma       Patient Seen, Chart, Consults notes, Labs, Radiology studies reviewed. 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 Co-managed with Dr. Anaya Denton  · Fibromyalgia managed by Dr. 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 Earnest Presley, ENT at  [facility]  · S/P right complete parotidectomy by Dr. Earnest Presley at [facility] on 7/24/2020 for the 1.7 cm right parotid mass, path = reactive follicular hyperplasia, no evidence of Hodgkin's lymphoma  · Tumor screening and health maintenance    Adriana Fernandez is referred back to this clinic by her pulmonologist in [place] regarding abnormal lab work. Adriana Fernandez is also monitored for a LLL pulmonary nodule. In review of systems she states that she has been having drenching night sweats over the past several weeks. Otherwise no other B symptoms. Latha Martínez also has episodic left hip pain that comes and is significant and will be completely gone by the next day sometimes. Dr. Sean Byers, her rheumatologist is treating her for fibromyalgia.     She has hydrocodone but does not like to take it unless she absolutely has to. She rarely takes it because many times the pain goes away on its own. Augustin Son was seen on 6/20/2022 referred by her PCP for questions regarding hyperproteinemia. Work-up has been done and she returns for review and recommendations. 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 Full serological workup was done including a CMP, LDH, B2M, serum protein electrophoresis and quantitative immunoglobulins, all which were normal.     A bone marrow biopsy and aspirate on 05/17/10 was performed and was virtually normal except for low iron stores. Cytogenetics were normal. Hematologically this is consistent with a post splenectomy leukocytosis and will subsequently be monitored conservatively. Bone marrow 11/29/2017  Normocellular bone marrow for age (~50-60%) with trilineage hematopoiesis, no significant dyspoiesis and ~1-2% blasts   Polyclonal plasmacytosis (~68% on  stain)   No stainable storage iron present; no increase in reticulin fibrosis   Negative FISH STUDY for BCR/ABL translocation   Negative molecular study for MPN profile including JAK2 [id number] point mutation   Normal female karyotype   No evidence of involvement of Hodgkin's lymphoma       TUMOR HISTORY: Stage IIA nodular sclerosing Hodgkins lymphoma diagnosed 06/11/92  Augustin Son was seen in initial oncology consultation on 6/26/1992 with a diagnosis of right supraclavicular nodular sclerosing Hodgkin's lymphoma for recommendations. Augustin Son presented  in her seventh month of her first pregnancy in the early part of 1992 with a palpable  right supraclavicular lymph node. After delivery on 05/11/92 she sought attention for this.     A right supraclavicular lymph node biopsy on 06/11/92   Pathology revealed a nodular sclerosing Hodgkins disease. Disease involved that area as well as the mediastinum. A bone marrow biopsy and aspirate did not show any evidence of metastatic disease. A staging laparotomy on 7/6/1992 that included lymph nodes biopsies and a splenectomy did not reveal any infradiaphragmatic malignant pathology. There were miliary granulomatous lesions noted throughout the spleen and on the surface of the liver, but no evidence of Hodgkins. She was referred to radiation therapy and was cared for by Dr. Rachael Valderrama. [facility] in [place], [place]. He delivered approximately 3,960 cGy with a boost to the right neck. Her radiation therapy was in the mantle and periaortic lymph node fashion. She has been monitored conservatively since that time    TREATMENT SUMMARY:  1. XRT to the right neck, as noted above       SECONDARY TUMOR MONITORING AND SCREENING:    #1  Subcentimeter pulmonary nodules and new 1 cm superior segment left lower lobe nodule of the subpleural posterior medial surface with increased FDG uptake and SUV of 5.5, worrisome for malignancy. Co-managed with Dr. Karri Mcintosh  developed chest pain and pressure and presented to the [facility] in [place] with a CTA chest on 1/10/2019 revealing 2 noncalcified nodules of the RLL-largest 6 mm. CT chest with contrast on 7/17/2019 documented:  · 6 mm RLL lung nodule, stable  · 4 mm RLL lung nodule, stable  · Suspect fatty infiltration of the liver, but evaluation is limited due to phase of contrast  · No lymphadenopathy      CT neck with contrast on 6/8/2020 at [facility] documented:  · No acute abnormality    · Small cervical chain lymph nodes as well as small nodules in the parotid gland, favorable of recurrence given patient history of lymphoma. Recommend PET-CT. Otherwise, US-guided FNA or core biopsy of the parotid nodules could be considered.      PET scan on 6/18/2020 documented:  · Mild asymmetrical uptake within the right posterior base of tongue along the left inguinal salivary gland with MAX SUV 4  · Mild FDG uptake within the intraparotid lymph nodes max SUV of 2.6 and the right and left intraparotid nodes  · Subcentimeter right posterior triangle lymph node has an SUV of 1.8  · New 1 cm superior segment left lower lobe nodule of the subpleural posterior medial surface with increased FDG uptake and SUV of 5.5, worrisome for malignancy. Lymphoma versus primary lung neoplasm considered. Given the small size and the positioning along the lateral margin of the descending thoracic aorta, percutaneous biopsy would be extremely difficult  · Stable borderline prominent size bilateral inguinal lymph nodes have mild FDG uptake with a maximum SUV of 2.5. · Right external iliac node has a maximum SUV of 2.5     I spoke to Dr. Leah Troncoso with thoracic surgery department at [facility] on 7/1/2020. He saw [facility] on 6/30/2020 with an in-depth and comprehensive consultation that is part of the record. Due to the fact that she has previously been radiated in the chest, the proximity of this lymph node or nodule to the aorta and the significant morbidity that may be involved with obtaining a surgical tissue diagnosis, recommendation is to have a follow-up CT scan in 6 months and hold off on intervention until there is more evidence of obvious growth or progression that unequivocally would require such an intervention for diagnosis. I believe this is reasonable. Additionally, if the needle biopsy of the parotid or if in ENT evaluation at [facility], she is found to have a lymphoproliferative disorder that would require systemic chemotherapy, then that should as well potentially address the abnormality noted in the chest.  I will follow this closely along with Dr. Albert Roper as well.     A follow-up CT scan of the chest will be performed in 1 ~December 2020 continued monitoring      5/18/2021 - CT CHEST W CONTRAST at [facility], compared to 11/10/2020, documented:  · There is no significant interval change. · Nonmasslike density in the posterior medial left lower lobe adjacent and lateral to the descending thoracic aorta is unchanged and again measures 9.6 mm as in the previous study. · 2 adjacently located, well-defined, noncalcified nodules in the right lower lobe posteriorly are stable. These measure 4 mm and 5 mm in diameter. No change. · No new noncalcified nodules are seen. #2  Right base of tongue nonspecific abnormality noted on PET scan at [address] [place] [address] 1/3/2019, monitored by Dr. Maite Bravo, ENT at  [address] [place] [address]  S/P right complete parotidectomy by Dr. Maite Bravo at [address] [place] [address] on 7/24/2020 for the 1.7 cm right parotid mass    She saw Dr. Faheem Dietz and reports that a PET scan on 1/3/2019 at [address] [place] [address] identifying an abnormality at the base of the tongue-throat area. Dr. Faheem Dietz referred her to Dr. Maite Bravo - ENT at [address] [place] [address] and she was seen in January of 2019 with a scope that did find a small abnormality in the right tongue region but this was too small to biopsy with a follow-up arrangement made for March 2019. She did not have a follow-up with Dr. Faheem Dietz. CT neck with contrast on 6/8/2020 at [facility] documented:  · No acute abnormality    · Small cervical chain lymph nodes as well as small nodules in the parotid gland, favorable of recurrence given patient history of lymphoma. Recommend PET-CT. Otherwise, US-guided FNA or core biopsy of the parotid nodules could be considered. Examination today (6/15/2020) does not reveal evidence of palpable peripheral lymphadenopathy in the head and neck area.   Inspection and digital palpation of the base of tongue with extraction of the tongue does not disclose nodules or masses in the base of tongue or in the head and neck area that I can appreciate on exam.    Kerrie Nguyen was evaluated by Dr. Maite Bravo on 6/16/2020  for further ENT evaluation. PET scan on 6/18/2020 documented:  · Mild asymmetrical uptake within the right posterior base of tongue along the left inguinal salivary gland with MAX SUV 4  · Mild FDG uptake within the intraparotid lymph nodes max SUV of 2.6 and the right and left intraparotid nodes  · Subcentimeter right posterior triangle lymph node has an SUV of 1.8  · New 1 cm superior segment left lower lobe nodule of the subpleural posterior medial surface with increased FDG uptake and SUV of 5.5, worrisome for malignancy. Lymphoma versus primary lung neoplasm considered. Given the small size and the positioning along the lateral margin of the descending thoracic aorta, percutaneous biopsy would be extremely difficult  · Stable borderline prominent size bilateral inguinal lymph nodes have mild FDG uptake with a maximum SUV of 2.5. · Right external iliac node has a maximum SUV of 2.5     US-guided FNA of the 1.7 cm right parotid mass was performed on 7/9/2020 at [facility]. Pathology revealed:  Parotid, right, FNA:   · Histiocytes and lymphocytes; negative for malignancy     Right complete parotidectomy was performed on 7/24/2020 by Dr. Roselyn Gould at [facility]. Pathology revealed:  · Four lymph nodes with reactive follicular hyperplasia  · Findings consistent with a reactive process  · Increased reactive follicles with germinal centers throughout the lymph node  · The paracortical expansion with focal increase in histiocytes and Langerhans cells suggests mild dermatopathia. · No evidence of the patient's previously diagnosed Hodgkin lymphoma or other hematolymphoid  process. Correlation with clinical and other laboratory findings is advised.       Allergies:  Compazine [prochlorperazine] and Prochlorperazine edisylate    Medicines:  Current Outpatient Medications   Medication Sig Dispense Refill    estradiol (ESTRACE) 0.1 MG/GM vaginal cream USE AS DIRECTED 1 GRAM THREE TIMES PER WEEK      pregabalin (LYRICA) 50 MG capsule       naproxen (NAPROSYN) 500 MG tablet Take 1 tablet by mouth 2 times daily (Patient not taking: Reported on 11/30/2021) 60 tablet 0    DULoxetine (CYMBALTA) 60 MG extended release capsule 30 mg Per patient is taken 30 mg      metoprolol tartrate (LOPRESSOR) 50 MG tablet Take 50 mg by mouth 2 times daily      glipiZIDE (GLUCOTROL) 5 MG tablet Take 10 mg by mouth 2 times daily (before meals)       albuterol sulfate HFA (PROAIR HFA) 108 (90 Base) MCG/ACT inhaler Inhale 2 puffs into the lungs every 6 hours as needed for Wheezing      Biotin 50551 MCG TBDP Take by mouth      cetirizine (ZYRTEC) 10 MG tablet Take 10 mg by mouth 2 times daily as needed for Allergies      metFORMIN (GLUCOPHAGE) 500 MG tablet Take 500 mg by mouth 2 times daily (with meals)       lansoprazole (PREVACID SOLUTAB) 15 MG disintegrating tablet Take 15 mg by mouth daily       SYNTHROID 125 MCG tablet Take 100 mcg by mouth daily        No current facility-administered medications for this visit.        Past Medical History:      Diagnosis Date    Anxiety     Carpal tunnel syndrome of right wrist 10/5/2017    Depression     Diabetes (HCC)     borderline in the past    GERD (gastroesophageal reflux disease)     Hodgkin's lymphoma (HCC) 24 years ago    Tx with XRT    Hyperprolactinemia ([address]     Hypothyroidism     Multiple thyroid nodules     Nonalcoholic fatty liver disease     Tachycardia     S/P cardiac ablation 4/2016        Past Surgical History:      Procedure Laterality Date    ANKLE SURGERY      3 screws    BONE MARROW BIOPSY N/A 11/29/2017    BONE MARROW ASPIRATION BIOPSY performed by Michelle Coleman MD at [address] [facility]      exploritory surgery for ganesh right side of neck    [place] REVISE MEDIAN N/CARPAL TUNNEL SURG Right 10/5/2017    CARPAL TUNNEL RELEASE performed by Justin Rose DO at [address] [facility] 18 TUBAL LIGATION      TYMPANOSTOMY TUBE PLACEMENT          Family History:      Problem Relation Age of Onset    Breast Cancer Maternal Grandmother         Social History  Social History     Tobacco Use    Smoking status: Never Smoker    Smokeless tobacco: Never Used   Substance Use Topics    Alcohol use: Yes     Comment: occ    Drug use: No               Labs:  BMP: No results for input(s): NA, K, CL, CO2, PHOS, BUN, CREATININE, CALCIUM in the last 72 hours. CBC:   Recent Labs     06/30/22  1245   WBC 17.68*   HGB 13.1   HCT 40.6   MCV 92.5   *     LIVER PROFILE: No results for input(s): AST, ALT, LIPASE, BILIDIR, BILITOT, ALKPHOS in the last 72 hours. Invalid input(s): AMYLASE,  ALB  PT/INR: No results for input(s): PROTIME, INR in the last 72 hours. APTT: No results for input(s): APTT in the last 72 hours. BNP:  No results for input(s): BNP in the last 72 hours. Ionized Calcium:No results for input(s): IONCA in the last 72 hours. Magnesium:No results for input(s): MG in the last 72 hours. Phosphorus:No results for input(s): PHOS in the last 72 hours. HgbA1C: No results for input(s): LABA1C in the last 72 hours. Hepatic: No results for input(s): ALKPHOS, ALT, AST, PROT, BILITOT, BILIDIR, LABALBU in the last 72 hours. Lactic Acid: No results for input(s): LACTA in the last 72 hours. Troponin: No results for input(s): CKTOTAL, CKMB, TROPONINT in the last 72 hours. ABGs: No results for input(s): PH, PCO2, PO2, HCO3, O2SAT in the last 72 hours. CRP:  No results for input(s): CRP in the last 72 hours. Sed Rate:  No results for input(s): SEDRATE in the last 72 hours. Cultures:   No results for input(s): CULTURE in the last 72 hours.         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 Co-managed with Dr. Cheng Humphreys  · Fibromyalgia managed by Dr. 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 Adelfo Saucedo, ENT at  [address] [place] [address]  · S/P right complete parotidectomy by Dr. 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 Noy Daigle is also monitored for a LLL pulmonary nodule. In review of systems she states that she has been having drenching night sweats over the past several weeks. Otherwise no other B symptoms. Nalini Ly also has episodic left hip pain that comes and is significant and will be completely gone by the next day sometimes. Dr. Nelson Hu, her rheumatologist is treating her for fibromyalgia. She has hydrocodone but does not like to take it unless she absolutely has to. She rarely takes it because many times the pain goes away on its own. Noy Daigle was seen on 6/20/2022 referred by her PCP for questions regarding hyperproteinemia. Work-up has been done and she returns for review and recommendations. Physical examination today, 6/20/2022 does not reveal evidence of palpable peripheral lymphadenopathy in the head and neck area, axilla supra or infraclavicular areas nor inguinal areas.   The right total parotidectomy scar is barely visible in the preauricular area with excellent healing of the scar behind the right ear  Abdominal exam is without abdominal organomegaly or masses. Extremities no cyanosis clubbing or edema    CBC 7/1/2020 revealed a WBC of 19.23 with a hemoglobin of 13.7 and a platelet count of 035,270. CBC 8/11/2020 revealed a WBC of 14.40. Hgb is 12.4 with an MCV of 97.8 and platelet count of 864,666. CBC 11/3/2020 revealed a WBC of 20.02. Hgb is 12.3 with an MCV of 95.8 and platelet count of 448,056. CBC 11/24/2020 had a WBC of 18.1 with a hemoglobin of 13.2 and a platelet count of 606,082. CBC 5/26/2021 revealed a WBC of 17.98. Hgb is 12.5 with an MCV of 95.5 and platelet count of 723,220. CBC 11/30/2021 revealed a WBC of 16.13 . Hgb is 11.7 with an MCV of 97.1 and platelet count of 203,461. CBC 6/20/2022  reveals a WBC of 16.54 Hgb is 12.8 with an MCV of 94.0 and platelet count of 316,094. CBC today 6/30/2022 reveals a WBC of 17.68 Hgb is 13.1 with an MCV of 92.5 and platelet count of 921,663. I am unable to appreciate palpable peripheral lymphadenopathy nor abnormalities in the abdomen. Her lungs are remarkably clear. Examination is unremarkable for new developments to suggest recurrent or new disease. I am unable to appreciate evidence of Hodgkin's lymphoma on examination of peripheral lymph node sites. This however does not rule out the possibility of occult lymphadenopathy in the chest or abdomen. She states that she is having drenching night sweats and therefore imaging studies are being requested. Additionally, she has a chronically elevated protein level >9.0 that has previously been worked up. Her PCP, Dr. Nathan Matthews ran lab work documenting an elevated protein of 9.2 with polyclonal immunoglobulins and an elevated IgG of 4102 and elevated IgA of 702. The SPEP documented NO M - spike. Please see #8 below.     #2.  Right base of tongue nonspecific abnormality noted on PET scan at [address] [place] [address] 1/3/2019, monitored by Dr. Baldo Coreas, ENT at [place]  S/P right complete parotidectomy by Dr. [facility] at [facility] on 7/24/2020 for the 1.7 cm right parotid mass    She saw Dr. Rody Galdamez and reports that a PET scan on 1/3/2019 at [facility] identifying an abnormality at the base of the tongue-throat area. Dr. Rody Galdamez referred her to Dr. [facility] - ENT at [facility] and she was seen in January of 2019 with a scope that did find a small abnormality in the right tongue region but this was too small to biopsy with a follow-up arrangement made for March 2019. She did not have a follow-up with Dr. Rody Galdamez. CT neck with contrast on 6/8/2020 at [facility] documented:  · No acute abnormality    · Small cervical chain lymph nodes as well as small nodules in the parotid gland, favorable of recurrence given patient history of lymphoma. Recommend PET-CT. Otherwise, US-guided FNA or core biopsy of the parotid nodules could be considered. Examination today (6/20/2022) does not reveal evidence of palpable peripheral lymphadenopathy in the head and neck area. Inspection and digital palpation of the base of tongue with extraction of the tongue does not disclose nodules or masses in the base of tongue or in the head and neck area that I can appreciate on exam.    Chace Maya was evaluated by Dr. [facility] on 6/16/2020  for further ENT evaluation. PET scan on 6/18/2020 documented:  · Mild asymmetrical uptake within the right posterior base of tongue along the left inguinal salivary gland with MAX SUV 4  · Mild FDG uptake within the intraparotid lymph nodes max SUV of 2.6 and the right and left intraparotid nodes  · Subcentimeter right posterior triangle lymph node has an SUV of 1.8  · New 1 cm superior segment left lower lobe nodule of the subpleural posterior medial surface with increased FDG uptake and SUV of 5.5, worrisome for malignancy. Lymphoma versus primary lung neoplasm considered.  Given the small size and the positioning along the lateral margin of the descending thoracic aorta, percutaneous biopsy would be extremely difficult  · Stable borderline prominent size bilateral inguinal lymph nodes have mild FDG uptake with a maximum SUV of 2.5. · Right external iliac node has a maximum SUV of 2.5     US-guided FNA of the 1.7 cm right parotid mass was performed on 7/9/2020 at [facility]. Pathology revealed:  Parotid, right, FNA:   · Histiocytes and lymphocytes; negative for malignancy     Right complete parotidectomy was performed on 7/24/2020 by Dr. Aubrey Matthews at [facility]. Pathology revealed:  · Four lymph nodes with reactive follicular hyperplasia  · Findings consistent with a reactive process  · Increased reactive follicles with germinal centers throughout the lymph node  · The paracortical expansion with focal increase in histiocytes and Langerhans cells suggests mild dermatopathia. · No evidence of the patient's previously diagnosed Hodgkin lymphoma or other hematolymphoid  process. Correlation with clinical and other laboratory findings is advised. Patient was seen by Dr Latesha Hdz on 8/25/2020 at [facility]  for post-op follow-up from superficial parotidectomy 7/24/20. She is doing well postoperatively and will not require any additional follow-ups. #3.   Subcentimeter pulmonary nodules and new 1 cm superior segment left lower lobe nodule of the subpleural posterior medial surface with increased FDG uptake and SUV of 5.5, worrisome for malignancy. Co-managed with Dr. Jaskaran Carr  developed chest pain and pressure and presented to the [facility] in [place] with a CTA chest on 1/10/2019 revealing 2 noncalcified nodules of the RLL-largest 6 mm.     CT chest with contrast on 7/17/2019 documented:  · 6 mm RLL lung nodule, stable  · 4 mm RLL lung nodule, stable  · Suspect fatty infiltration of the liver, but evaluation is limited due to phase of contrast  · No lymphadenopathy      CT neck with contrast on 6/8/2020 at [facility] documented:  · No acute abnormality    · Small cervical chain lymph nodes as well as small nodules in the parotid gland, favorable of recurrence given patient history of lymphoma. Recommend PET-CT. Otherwise, US-guided FNA or core biopsy of the parotid nodules could be considered. PET scan on 6/18/2020 documented:  · Mild asymmetrical uptake within the right posterior base of tongue along the left inguinal salivary gland with MAX SUV 4  · Mild FDG uptake within the intraparotid lymph nodes max SUV of 2.6 and the right and left intraparotid nodes  · Subcentimeter right posterior triangle lymph node has an SUV of 1.8  · New 1 cm superior segment left lower lobe nodule of the subpleural posterior medial surface with increased FDG uptake and SUV of 5.5, worrisome for malignancy. Lymphoma versus primary lung neoplasm considered. Given the small size and the positioning along the lateral margin of the descending thoracic aorta, percutaneous biopsy would be extremely difficult  · Stable borderline prominent size bilateral inguinal lymph nodes have mild FDG uptake with a maximum SUV of 2.5. · Right external iliac node has a maximum SUV of 2.5     I spoke to Dr. Branden Carl with thoracic surgery department at [facility] on 7/1/2020. He saw Dinah Tiwari on 6/30/2020 with an in-depth and comprehensive consultation that is part of the record. Due to the fact that she has previously been radiated in the chest, the proximity of this lymph node or nodule to the aorta and the significant morbidity that may be involved with obtaining a surgical tissue diagnosis, recommendation is to have a follow-up CT scan in 6 months and hold off on intervention until there is more evidence of obvious growth or progression that unequivocally would require such an intervention for diagnosis. I believe this is reasonable.   Additionally, if the needle biopsy of the parotid or if in ENT evaluation at [facility], she is found to have a lymphoproliferative disorder that would require systemic chemotherapy, then that should as well potentially address the abnormality noted in the chest.  I will follow this closely along with Dr. Mariely Hedrick as well. CXR 11/3/2020 documented:  · No acute cardiopulmonary abnormality. · 1 cm left lower lobe nodule seen on previous PET CT is not clearly demonstrated. Repeat CT would be much more sensitive    A follow-up CT scan of the chest will be performed for continued monitoring. I will see back in follow-up in 3 weeks. CT CHEST on 11/10/2020 at [facility]  compared to 6/18/2020 documented:  · 1 cm, stable, medial left lower lobe pulmonary nodule. · No lymphadenopathy. · Diffuse low-density of the liver, favored to be due to fatty liver. CT CHEST W CONTRAST on 5/18/2021 - at [address], compared to 11/10/2020, documented:  · There is no significant interval change. · Nonmasslike density in the posterior medial left lower lobe adjacent and lateral to the descending thoracic aorta is unchanged and again measures 9.6 mm as in the previous study. · 4 mm and 5 mm adjacently located, well-defined, noncalcified nodules in the right lower lobe posteriorly are stable with no change. · No new noncalcified nodules are seen. CT Chest  W Contrast 10/25/2021 :COMPARISON: 10/19/2021, 5/18/2021, 7/17/2019  · 6 mm and 5 mm adjacent right lower lobe pulmonary nodules, stable for over 2 years  · 10 x 7 mm nodular density with associated air bronchograms stable within the posterior medial superior segment left lower lobe abutting the medial mediastinum   · Subcentimeter groundglass nodules in the superior segment left lower lobe are unchanged compared to 5/18/2021  · No pathologic intrathoracic or axillary lymphadenopathy identified. Stable pulmonary nodules compared to the prior the 18th 2021   · Largest posteromedial subpleural superior segment left lower lobe nodule measures 1 cm abutting the medial pleural surface, stable compared to 5/18/2021  · No new or increasing size pulmonary nodules or pathologic intrathoracic lymphadenopathy    CT OF THE CHEST WITH CONTRAST at [facility] on 6/29/2022:COMPARISON 10/25/2021  · 4 mm sub- centimeter noncalcified pulmonary nodule in the right lower lobe, stable   · 6 mm sub- centimeter noncalcified pulmonary nodule in the right lower lobe, stable  · 7 mm customer granuloma in the left lower lobe, stable   · calcified left hilar lymphadenopathy again noted, stable and unchanged. · No mediastinal, axillary or supraclavicular adenopathy identified      Imaging is stable to improved. As previously noted in 1992 at the time of staging laparotomy, Adriana Fernandez had granulomatous changes in her spleen and on the surface of her liver. This is more than likely what is going on in her chest but continued monitoring will need to continue due to the fact that she had Hodgkin's disease and received radiation therapy. Yearly CTs will be sufficient for monitoring of the pulmonary nodules at this juncture. I will see her back in follow-up in 6 months for physical examination. #4  GYN:  Pap Smear on 10/20/2021 was negative for malignancy      #5. Breast cancer tumor screening    She apparently had an abnormal mammogram of the right breast and is also under evaluation by Dr. Katerina Medina in [facility]. This is where she has her routine breast cancer screening and will continue according to Adriana Fernandez and her . She had a repeat mammogram and MRI of the breasts at [place] but results are pending. She will continue following with her physicians in [place].     #6  GI tumor screening    EGD and colonoscopy with biopsies were performed in [facility]  on 12/18/2018 by Dr. Milagros Leblanc and revealed the following:  · Antral gastritis  · Grade 3 ulcerative erosive reflux esophagitis with sliding hiatal hernia  · Lower esophageal benign narrowing  · Polyp in the cecum  · Rest of colonic exam unremarkable   Pathology revealed the following:  Biopsy, antrum:  · Mild chronic gastritis  · No intestinal metaplasia or dyplasia  · H. 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 Continue conservative monitoring only necessary for this issue. #8  Polyclonal plasmacytosis    Magaly Torres was evaluated in May of 2010 in a routine follow up for leukocytosis with a white count of 16,200 and 55.6% lymphocytes. Full serological workup was done including a CMP, LDH, B2M, serum protein electrophoresis and quantitative immunoglobulins, all which were normal.     A bone marrow biopsy and aspirate on 05/17/10 was performed and was virtually normal except for low iron stores. Cytogenetics were normal. Hematologically this is consistent with a post splenectomy leukocytosis and will subsequently be monitored conservatively. 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 Her PCP, Dr. Kartik Jerez ran lab work documenting an elevated protein of 9.2 with polyclonal immunoglobulins and an elevated IgG of 4102 and elevated IgA of 702. The SPEP documented NO M - spike.     Repeat serology is being requested today including SPEP, QI, LDH, B2M, kappa lambda light chains and CMP CT scans of the neck chest abdomen pelvis are also being requested with a follow-up in 2 weeks to review all results    Serology on 6/20/2022 revealed:  B2M-2.5  Kappa light chains- 166.20  Lambda light chains-118.34  K/L ratio-1.40  IgG-3951 ,IgA-783 , IgM-85  U-whsni-Pphsklwwc albumin region.  Slight restriction within the   polyclonal increase in the gamma region.  JOSÉ MIGUEL gel shows a   normal pattern; no monoclonal proteins seen. Albumin - 3.21  Alpha-1 globulin - 0.34  Alpha-2-globulin - 1.05  Beta globulin - 1.15  Gamma globulin - 3.86  CMP - Total protein of 9.9, Alk Phos 145, AST 44, glucose 204    CT OF THE NECK WITH CONTRAST at [facility] on 6/29/2022:  · Scattered bilateral cervical lymph nodes are present however, none are pathologically enlarged  · No acute abnormality     CT OF THE CHEST WITH CONTRAST at [facility] on 6/29/2022:  · 4 mm sub- centimeter noncalcified pulmonary nodule in the right lower lobe, stable   · 6 mm sub- centimeter noncalcified pulmonary nodule in the right lower lobe, stable  · 7 mm customer granuloma in the left lower lobe, stable   · calcified left hilar lymphadenopathy again noted, stable and unchanged. · No mediastinal, axillary or supraclavicular adenopathy identified    CT OF THE ABDOMEN/PELVIS WITH IV AND ORAL CONTRAST at [facility] on 6/29/2022:  · hepatomegaly with diffuse fatty infiltration  · There are two enhancing accessories spleens in the left upper quadrant which measured 2.3 cm and 2.0 cm respectively. · No enlarged retroperitoneal lymphadenopathy  · Shoddy pelvic lymphadenopathy is noted  · Bilateral sub centimeter pelvic nodes are noted  · 9 mm bilateral inguinal lymphadenopathy  · 2.6 cm right ovarian cyst    Imaging studies do not reveal evidence of recurrent or new lymphoma nor secondary malignancies that can be ascertained with imaging studies. Serology from 6/20/2022 again confirmed polyclonal gammopathy without evidence of monoclonal gammopathy.   She is of course asymptomatic from this standpoint except for the fact that she probably has symptomatic rheumatological disorders creating the hyperproteinemia and a polyclonal fashion. I suspect that if she ever takes a therapeutic dose of steroids, the proteins might level out but of course she does not desire aggressive steroid management and tolerates her osteoarthritis like symptoms fairly well. I will continue monitoring this aspect of her care conservatively. A follow-up appointment is made in 6 months    #9  Immunizations    There is no immunization history on file for this patient. Marlene has not had an active COVID infection, nor has she had any vaccination. She is encouraged to get vaccinated however she is very fearful of the vaccine, to the point that she is willing to lose her job as a  at a healthcare facility which will probably happen this Friday because of her fear of the vaccination. This is unfortunate. Marlene was seen today for follow-up. Diagnoses and all orders for this visit:    History of non-Hodgkin's lymphoma  -     Comprehensive Metabolic Panel; Future  -     Lactate Dehydrogenase; Future  -     MONOCLONAL PROTEIN AND FLC, SERUM; Future  -     Beta 2 Microglobulin, Serum; Future    Pulmonary nodules    Healthcare maintenance        Orders Placed This Encounter   Procedures    Comprehensive Metabolic Panel     Standing Status:   Future     Standing Expiration Date:   6/30/2023    Lactate Dehydrogenase     Standing Status:   Future     Standing Expiration Date:   6/30/2023    MONOCLONAL PROTEIN AND FLC, SERUM     Standing Status:   Future     Standing Expiration Date:   6/30/2023    Beta 2 Microglobulin, Serum     Standing Status:   Future     Standing Expiration Date:   6/30/2023       No orders of the defined types were placed in this encounter. Return in about 6 months (around 12/30/2022) for Follow-up with Dr. Urmila Cano with labs prior to.

## 2022-06-30 ENCOUNTER — HOSPITAL ENCOUNTER (OUTPATIENT)
Dept: INFUSION THERAPY | Age: 52
Discharge: HOME | End: 2022-06-30
Payer: COMMERCIAL

## 2022-06-30 ENCOUNTER — OFFICE VISIT (OUTPATIENT)
Dept: HEMATOLOGY | Age: 52
End: 2022-06-30
Payer: COMMERCIAL

## 2022-06-30 VITALS
HEART RATE: 95 BPM | HEIGHT: 64 IN | SYSTOLIC BLOOD PRESSURE: 126 MMHG | DIASTOLIC BLOOD PRESSURE: 78 MMHG | WEIGHT: 195.1 LBS | OXYGEN SATURATION: 97 % | BODY MASS INDEX: 33.31 KG/M2

## 2022-06-30 DIAGNOSIS — Z85.72 HISTORY OF NON-HODGKIN'S LYMPHOMA: ICD-10-CM

## 2022-06-30 DIAGNOSIS — R91.8 PULMONARY NODULES: ICD-10-CM

## 2022-06-30 DIAGNOSIS — Z85.72 HISTORY OF NON-HODGKIN'S LYMPHOMA: Primary | ICD-10-CM

## 2022-06-30 DIAGNOSIS — Z00.00 HEALTHCARE MAINTENANCE: ICD-10-CM

## 2022-06-30 LAB
BASOPHILS ABSOLUTE: 0.11 K/UL (ref 0.01–0.08)
BASOPHILS RELATIVE PERCENT: 0.6 % (ref 0.1–1.2)
EOSINOPHILS ABSOLUTE: 0.53 K/UL (ref 0.04–0.54)
EOSINOPHILS RELATIVE PERCENT: 3 % (ref 0.7–7)
HCT VFR BLD CALC: 40.6 % (ref 34.1–44.9)
HEMOGLOBIN: 13.1 G/DL (ref 11.2–15.7)
LYMPHOCYTES ABSOLUTE: 4.3 K/UL (ref 1.18–3.74)
LYMPHOCYTES RELATIVE PERCENT: 24.3 % (ref 19.3–53.1)
MCH RBC QN AUTO: 29.8 PG (ref 25.6–32.2)
MCHC RBC AUTO-ENTMCNC: 32.3 G/DL (ref 32.3–35.5)
MCV RBC AUTO: 92.5 FL (ref 79.4–94.8)
MONOCYTES ABSOLUTE: 2.23 K/UL (ref 0.24–0.82)
MONOCYTES RELATIVE PERCENT: 12.6 % (ref 4.7–12.5)
NEUTROPHILS ABSOLUTE: 10.43 K/UL (ref 1.56–6.13)
NEUTROPHILS RELATIVE PERCENT: 59 % (ref 34–71.1)
PDW BLD-RTO: 15.1 % (ref 11.7–14.4)
PLATELET # BLD: 174 K/UL (ref 182–369)
PMV BLD AUTO: 11.1 FL (ref 7.4–10.4)
RBC # BLD: 4.39 M/UL (ref 3.93–5.22)
WBC # BLD: 17.68 K/UL (ref 3.98–10.04)

## 2022-06-30 PROCEDURE — 99214 OFFICE O/P EST MOD 30 MIN: CPT | Performed by: INTERNAL MEDICINE

## 2022-06-30 PROCEDURE — 99212 OFFICE O/P EST SF 10 MIN: CPT

## 2022-06-30 PROCEDURE — 85025 COMPLETE CBC W/AUTO DIFF WBC: CPT

## 2023-01-03 NOTE — PROGRESS NOTES
Sta  Patient:  Adrian Randall  YOB: 1970  Date of Service: 2/1/2023  MRN: [id number]    Primary Care Physician: Mamta Maguire    Chief Complaint   Patient presents with    Follow-up     Stage IIA Nodular Sclerosing Hodgkin's Lymphoma  Leukocytosis  Sub cm pulmonary nodules         Patient Seen, Chart, Consults notes, Labs, Radiology studies reviewed. 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 Co-managed with Dr. Davion Barth managed by Dr. 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 Benson Hinds, ENT at  [facility]  S/P right complete parotidectomy by Dr. 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 Marlene is also monitored for a LLL pulmonary nodule. In review of systems she states that she has been having drenching night sweats over the past several weeks. Otherwise no other B symptoms. [facility] also has episodic left hip pain that comes and is significant and will be completely gone by the next day sometimes. Dr. Braulio Bazan, her rheumatologist is treating her for fibromyalgia.     She has hydrocodone but does not like to take it unless she absolutely has to. She rarely takes it because many times the pain goes away on its own. Addie's family desired that she have a second opinion regarding leukocytosis. She was seen by Dr. Tab Noe on 8/4/2022 at the [facility], in [place]. Work-up has been done as will be outlined below in assessment and plan. She comes today with additional issues of hoarseness that she states that she has had over a year and a bit more lymphedema in the face over the last couple of months. 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 Full serological workup was done including a CMP, LDH, B2M, serum protein electrophoresis and quantitative immunoglobulins, all which were normal.     A bone marrow biopsy and aspirate on 05/17/10 was performed and was virtually normal except for low iron stores. Cytogenetics were normal. Hematologically this is consistent with a post splenectomy leukocytosis and will subsequently be monitored conservatively. Bone marrow 11/29/2017  Normocellular bone marrow for age (~50-60%) with trilineage hematopoiesis, no significant dyspoiesis and ~1-2% blasts   Polyclonal plasmacytosis (~68% on  stain)   No stainable storage iron present; no increase in reticulin fibrosis   Negative FISH STUDY for BCR/ABL translocation   Negative molecular study for MPN profile including JAK2 V617F point mutation   Normal female karyotype   No evidence of involvement of Hodgkin's [place] [facility] has a chronically elevated protein level >9.0 that has previously been worked up.     Her PCP, Dr. Lady Torres ran lab work documenting an elevated protein of 9.2 with polyclonal immunoglobulins and an elevated IgG of 4102 and elevated IgA of 702. The SPEP documented NO M - spike. Serology on 6/20/2022 revealed:  B2M-2.5  Kappa light chains- 166.20  Lambda light chains-118.34  K/L ratio-1.40  IgG-3951 ,IgA-783 , IgM-85  M-dqqqk-Vyapjfpov albumin region. Slight restriction within the   polyclonal increase in the gamma region. JOSÉ MIGUEL gel shows a   normal pattern; no monoclonal proteins seen. Albumin - 3.21  Alpha-1 globulin - 0.34  Alpha-2-globulin - 1.05  Beta globulin - 1.15  Gamma globulin - 3.86  CMP - Total protein of 9.9, Alk Phos 145, AST 44, glucose 204    CT OF THE NECK WITH CONTRAST at [facility] on 6/29/2022:  Scattered bilateral cervical lymph nodes are present however, none are pathologically enlarged  No acute abnormality     CT OF THE CHEST WITH CONTRAST at [facility] on 6/29/2022:  4 mm sub- centimeter noncalcified pulmonary nodule in the right lower lobe, stable   6 mm sub- centimeter noncalcified pulmonary nodule in the right lower lobe, stable  7 mm customer granuloma in the left lower lobe, stable   calcified left hilar lymphadenopathy again noted, stable and unchanged. No mediastinal, axillary or supraclavicular adenopathy identified    CT OF THE ABDOMEN/PELVIS WITH IV AND ORAL CONTRAST at [facility] on 6/29/2022:  hepatomegaly with diffuse fatty infiltration  There are two enhancing accessories spleens in the left upper quadrant which measured 2.3 cm and 2.0 cm respectively. No enlarged retroperitoneal lymphadenopathy  Shoddy pelvic lymphadenopathy is noted  Bilateral sub centimeter pelvic nodes are noted  9 mm bilateral inguinal lymphadenopathy  2.6 cm right ovarian cyst    Imaging studies do not reveal evidence of recurrent or new lymphoma nor secondary malignancies that can be ascertained with imaging studies. Serology from 6/20/2022 again confirmed polyclonal gammopathy without evidence of monoclonal gammopathy.   She is of course asymptomatic from this standpoint except for the fact that she probably has symptomatic rheumatological disorders creating the hyperproteinemia in a polyclonal fashion. I suspect that if she ever takes a therapeutic dose of steroids, the proteins might level out but of course she does not desire aggressive steroid management and tolerates her osteoarthritis like symptoms fairly well. Second Opinion at [facility] [place]) with Dr. Danielle Beard on 8/4/2022:  Reviewed and interpreted today's ESR, CBC, and CMP along with outside medical records  Counseled patient and  in regard to [facility]. No evidence at this time suggest malignancy. This appears most consistent with inflammatory response. Follow up labs: CRP, ESR, LDH, haptoglobin, and peripheral blood flow cytometry  Follow up MPN screening with CALR, MPL, JAK2 V617F, and JAK2 exon 12  Follow up [facility] for BCR/ABL. MRI brain with and without contrast next available to rule out possible MS  Bone marrow biopsy next available to rule out infiltrative marrow process  Return to clinic 3 weeks after bone marrow biopsy without labs    Serology on 8-4-2022 revealed:  BCR::ABL1 rearrangement- Negative  JAK2 Exon 12 - Not Detected  MPL Exon 10 Mutation -Not Detected  Calreticulin (CALR) Exon 9 Mutation- Not Detected  JAK2 c.1849G>T (p.V617F) Mutation-Not Detected  C-Reactive Protein-2.0 (0-0.9)  LDH-211  (120-246)  Sed Rate-104 (0-30)    Flow Cytometry Date Ordered: 8/5/2022   DIAGNOSIS: No significant abnormalities     COMMENT: There is no significant population of myeloblasts by CD33, CD34, CD45, and forward/side scatter. B cells are not increased (4.9% of total cells), with the following normal immunophenotype: positive for CD19 and CD45 (bright); and negative for CD34. T/NK cells are present (6.8% of total cells), with the following normal immunophenotype: positive for CD45 (bright); and negative for CD19 and CD34.  The remaining cells consist of monocytes and granulocytes by CD33, CD45, and forward/side scatter. Bone Marrow Biopsy at [facility] on 8/16/2022:  Myeloblasts are not increased (0.1% of total cells), with the following normal immunophenotype: positive for CD33 (moderate), CD34, and CD45 (dim); and negative for CD19. B cells are present (7.5% of total cells), with the following normal immunophenotype: polytypic for surface and cytoplasmic kappa and lambda; positive for CD19, CD38 (heterogeneous moderate), and CD45; and negative for CD5 and CD34. T cells are not increased (12% of total cells), with the following normal immunophenotype: mixed CD4 and CD8 subsets (CD4>CD8, CD4:CD8 ratio = 2.3:1); positive for CD3, CD5, and CD45; and negative for CD19 and CD34. Plasma cells are present (0.06% of total cells), with the following normal immunophenotype: polytypic for cytoplasmic kappa and lambda; positive for CD19, CD38 (bright), CD45 (moderate), and ; and negative for CD56. MR BRAIN WITHOUT AND WITH CONTRAST on 8/24/2022:COMPARISON:Brain MRI 05/03/2021  11 x 7 mm left parotid lymph node, stable  Normal pre- and postcontrast MRI of the brain      CBC today 2/1/2023 reveals a WBC of 17.0 Hgb is 12.9 with an MCV of  93.7 and platelet count of 672,635. I have reviewed all the above recent information in the second opinion by Dr. Mikhail Jorgensen. No new hematological developments or findings were uncovered. Conservative monitoring only recommended. 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 TUMOR HISTORY: Stage IIA nodular sclerosing Hodgkins lymphoma diagnosed 06/11/92  Jana Paula was seen in initial oncology consultation on 6/26/1992 with a diagnosis of right supraclavicular nodular sclerosing Hodgkin's lymphoma for recommendations.   Jana Paula presented  in her seventh month of her first pregnancy in the early part of 1992 with a palpable  right supraclavicular lymph node. After delivery on 05/11/92 she sought attention for this. A right supraclavicular lymph node biopsy on 06/11/92   Pathology revealed a nodular sclerosing Hodgkins disease. Disease involved that area as well as the mediastinum. A bone marrow biopsy and aspirate did not show any evidence of metastatic disease. A staging laparotomy on 7/6/1992 that included lymph nodes biopsies and a splenectomy did not reveal any infradiaphragmatic malignant pathology. There were miliary granulomatous lesions noted throughout the spleen and on the surface of the liver, but no evidence of Hodgkins. She was referred to radiation therapy and was cared for by Dr. Alison James. Kaushal in [place], [place]. He delivered approximately 3,960 cGy with a boost to the right neck. Her radiation therapy was in the mantle and periaortic lymph node fashion. She has been monitored conservatively since that time    TREATMENT SUMMARY:  XRT to the right neck, as noted above         SECONDARY TUMOR MONITORING AND SCREENING:    #1  Subcentimeter pulmonary nodules and new 1 cm superior segment left lower lobe nodule of the subpleural posterior medial surface with increased FDG uptake and SUV of 5.5, worrisome for malignancy. Co-managed with Dr. Bonnie Butler  developed chest pain and pressure and presented to the [facility] in [place] with a CTA chest on 1/10/2019 revealing 2 noncalcified nodules of the RLL-largest 6 mm.     CT chest with contrast on 7/17/2019 documented:  6 mm RLL lung nodule, stable  4 mm RLL lung nodule, stable  Suspect fatty infiltration of the liver, but evaluation is limited due to phase of contrast  No lymphadenopathy      CT neck with contrast on 6/8/2020 at [facility] documented:  No acute abnormality    Small cervical chain lymph nodes as well as small nodules in the parotid gland, favorable of recurrence given patient history of lymphoma. Recommend PET-CT. Otherwise, US-guided FNA or core biopsy of the parotid nodules could be considered.     PET scan on 6/18/2020 documented:  Mild asymmetrical uptake within the right posterior base of tongue along the left inguinal salivary gland with MAX SUV 4  Mild FDG uptake within the intraparotid lymph nodes max SUV of 2.6 and the right and left intraparotid nodes  Subcentimeter right posterior triangle lymph node has an SUV of 1.8  New 1 cm superior segment left lower lobe nodule of the subpleural posterior medial surface with increased FDG uptake and SUV of 5.5, worrisome for malignancy. Lymphoma versus primary lung neoplasm considered. Given the small size and the positioning along the lateral margin of the descending thoracic aorta, percutaneous biopsy would be extremely difficult  Stable borderline prominent size bilateral inguinal lymph nodes have mild FDG uptake with a maximum SUV of 2.5.   Right external iliac node has a maximum SUV of 2.5     I spoke to Dr. Ezequiel Gr with thoracic surgery department at [facility] on 7/1/2020.   He saw Addie on 6/30/2020 with an in-depth and comprehensive consultation that is part of the record.  Due to the fact that she has previously been radiated in the chest, the proximity of this lymph node or nodule to the aorta and the significant morbidity that may be involved with obtaining a surgical tissue diagnosis, recommendation is to have a follow-up CT scan in 6 months and hold off on intervention until there is more evidence of obvious growth or progression that unequivocally would require such an intervention for diagnosis.  I believe this is reasonable.  Additionally, if the needle biopsy of the parotid or if in ENT evaluation at [place], she is found to have a lymphoproliferative disorder that would require systemic chemotherapy, then that should as well potentially address the abnormality  noted in the chest.  I will follow this closely along with Dr. Favio Alfaro as well. A follow-up CT scan of the chest will be performed in 1 ~December 2020 continued monitoring      5/18/2021 - CT CHEST W CONTRAST at [facility], compared to 11/10/2020, documented: There is no significant interval change. Nonmasslike density in the posterior medial left lower lobe adjacent and lateral to the descending thoracic aorta is unchanged and again measures 9.6 mm as in the previous study. 2 adjacently located, well-defined, noncalcified nodules in the right lower lobe posteriorly are stable. These measure 4 mm and 5 mm in diameter. No change. No new noncalcified nodules are seen. #2  Right base of tongue nonspecific abnormality noted on PET scan at [facility] 1/3/2019, monitored by Dr. Alysia Hernandez, ENT at  [facility]  S/P right complete parotidectomy by Dr. Alysia Hernandez at [facility] on 7/24/2020 for the 1.7 cm right parotid mass    She saw Dr. Ivis Rose and reports that a PET scan on 1/3/2019 at [facility] identifying an abnormality at the base of the tongue-throat area. Dr. Ivis Rose referred her to Dr. Alysia Hernandez - ENT at [facility] and she was seen in January of 2019 with a scope that did find a small abnormality in the right tongue region but this was too small to biopsy with a follow-up arrangement made for March 2019. She did not have a follow-up with Dr. Ivis Rose. CT neck with contrast on 6/8/2020 at [facility] documented:  No acute abnormality    Small cervical chain lymph nodes as well as small nodules in the parotid gland, favorable of recurrence given patient history of lymphoma. Recommend PET-CT. Otherwise, US-guided FNA or core biopsy of the parotid nodules could be considered. Examination today (6/15/2020) does not reveal evidence of palpable peripheral lymphadenopathy in the head and neck area.   Inspection and digital palpation of the base of tongue with extraction of the tongue does not disclose nodules or masses in the base of tongue or in the head and neck area that I can appreciate on exam.    Sohan Hunt was evaluated by Dr. Aletha Delarosa on 6/16/2020  for further ENT evaluation. PET scan on 6/18/2020 documented:  Mild asymmetrical uptake within the right posterior base of tongue along the left inguinal salivary gland with MAX SUV 4  Mild FDG uptake within the intraparotid lymph nodes max SUV of 2.6 and the right and left intraparotid nodes  Subcentimeter right posterior triangle lymph node has an SUV of 1.8  New 1 cm superior segment left lower lobe nodule of the subpleural posterior medial surface with increased FDG uptake and SUV of 5.5, worrisome for malignancy. Lymphoma versus primary lung neoplasm considered. Given the small size and the positioning along the lateral margin of the descending thoracic aorta, percutaneous biopsy would be extremely difficult  Stable borderline prominent size bilateral inguinal lymph nodes have mild FDG uptake with a maximum SUV of 2.5. Right external iliac node has a maximum SUV of 2.5     US-guided FNA of the 1.7 cm right parotid mass was performed on 7/9/2020 at [facility]. Pathology revealed:  Parotid, right, FNA:   Histiocytes and lymphocytes; negative for malignancy     Right complete parotidectomy was performed on 7/24/2020 by Dr. Aletha Delarosa at [facility]. Pathology revealed:  Four lymph nodes with reactive follicular hyperplasia  Findings consistent with a reactive process  Increased reactive follicles with germinal centers throughout the lymph node  The paracortical expansion with focal increase in histiocytes and Langerhans cells suggests mild dermatopathia. No evidence of the patient's previously diagnosed Hodgkin lymphoma or other hematolymphoid  process. Correlation with clinical and other laboratory findings is advised.       Allergies:  Compazine [prochlorperazine] and Prochlorperazine edisylate    Medicines:  Current Outpatient Medications   Medication Sig Dispense Refill    SITagliptin (JANUVIA) 100 MG tablet Januvia 100 mg tablet   TAKE 1 TABLET BY MOUTH BEFORE BREAKFAST      estradiol (ESTRACE) 0.1 MG/GM vaginal cream USE AS DIRECTED 1 GRAM THREE TIMES PER WEEK      pregabalin (LYRICA) 50 MG capsule       DULoxetine (CYMBALTA) 60 MG extended release capsule 30 mg Per patient is taken 30 mg      metoprolol tartrate (LOPRESSOR) 50 MG tablet Take 50 mg by mouth 2 times daily      glipiZIDE (GLUCOTROL) 5 MG tablet Take 10 mg by mouth 2 times daily (before meals)       albuterol sulfate HFA (PROVENTIL;VENTOLIN;PROAIR) 108 (90 Base) MCG/ACT inhaler Inhale 2 puffs into the lungs every 6 hours as needed for Wheezing      cetirizine (ZYRTEC) 10 MG tablet Take 10 mg by mouth 2 times daily as needed for Allergies      metFORMIN (GLUCOPHAGE) 500 MG tablet Take 500 mg by mouth 2 times daily (with meals)       lansoprazole (PREVACID SOLUTAB) 15 MG disintegrating tablet Take 15 mg by mouth daily       SYNTHROID 125 MCG tablet Take 100 mcg by mouth daily       naproxen (NAPROSYN) 500 MG tablet Take 1 tablet by mouth 2 times daily (Patient not taking: No sig reported) 60 tablet 0    Biotin 52838 MCG TBDP Take by mouth (Patient not taking: Reported on 2/1/2023)       No current facility-administered medications for this visit.        Past Medical History:      Diagnosis Date    Anxiety     Carpal tunnel syndrome of right wrist 10/5/2017    Depression     Diabetes ([facility] [address]     borderline in the past    GERD (gastroesophageal reflux disease)     Hodgkin's lymphoma ([facility] [address] 24 years ago    Tx with XRT    Hyperprolactinemia ([facility])     Hypothyroidism     Multiple thyroid nodules     Nonalcoholic fatty liver disease     Tachycardia     S/P cardiac ablation 4/2016        Past Surgical History:      Procedure Laterality Date    ANKLE SURGERY      3 screws    BONE MARROW BIOPSY N/A 11/29/2017    BONE MARROW ASPIRATION BIOPSY performed by Surinder Hickman MD at [address] [place] [address] OTHER SURGICAL HISTORY      exploritory surgery for ganesh right side of neck    PA REVISE MEDIAN N/CARPAL TUNNEL SURG Right 10/5/2017    CARPAL TUNNEL RELEASE performed by Henok Sandoval DO at [facility]      TYMPANOSTOMY TUBE PLACEMENT          Family History:      Problem Relation Age of Onset    Breast Cancer Maternal Grandmother         Social History  Social History     Tobacco Use    Smoking status: Never    Smokeless tobacco: Never   Substance Use Topics    Alcohol use: Yes     Comment: occ    Drug use: No               Labs:  BMP: No results for input(s): NA, K, CL, CO2, PHOS, BUN, CREATININE, CALCIUM in the last 72 hours. CBC:   Recent Labs     02/01/23  1044   WBC 17.00*   HGB 12.9   HCT 40.1   MCV 93.7          LIVER PROFILE: No results for input(s): AST, ALT, LIPASE, BILIDIR, BILITOT, ALKPHOS in the last 72 hours. Invalid input(s): AMYLASE,  ALB  PT/INR: No results for input(s): PROTIME, INR in the last 72 hours. APTT: No results for input(s): APTT in the last 72 hours. BNP:  No results for input(s): BNP in the last 72 hours. Ionized Calcium:No results for input(s): IONCA in the last 72 hours. Magnesium:No results for input(s): MG in the last 72 hours. Phosphorus:No results for input(s): PHOS in the last 72 hours. HgbA1C: No results for input(s): LABA1C in the last 72 hours. Hepatic: No results for input(s): ALKPHOS, ALT, AST, PROT, BILITOT, BILIDIR, LABALBU in the last 72 hours. Lactic Acid: No results for input(s): LACTA in the last 72 hours. Troponin: No results for input(s): CKTOTAL, CKMB, TROPONINT in the last 72 hours. ABGs: No results for input(s): PH, PCO2, PO2, HCO3, O2SAT in the last 72 hours. CRP:  No results for input(s): CRP in the last 72 hours. Sed Rate:  No results for input(s): SEDRATE in the last 72 hours. Cultures:   No results for input(s): CULTURE in the last 72 hours.         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 Co-managed with Dr. Suzie France managed by Dr. 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 Alexandria Zhou, ENT at  [facility]  S/P right complete parotidectomy by Dr. 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 Genevieve Gaytan is also monitored for a LLL pulmonary nodule. In review of systems she states that she has been having drenching night sweats over the past several weeks. Otherwise no other B symptoms. Ligia Ruano also has episodic left hip pain that comes and is significant and will be completely gone by the next day sometimes. Dr. Ralf Bonner, her rheumatologist is treating her for fibromyalgia. She has hydrocodone but does not like to take it unless she absolutely has to. She rarely takes it because many times the pain goes away on its own. Addie's family desired that she have a second opinion regarding leukocytosis. She was seen by Dr. Allen Stern on 8/4/2022 at the [facility], in [place].   Work-up has been done as will be outlined below in assessment and plan.    She comes today with additional issues of hoarseness that she states that she has had over a year and a bit more lymphedema in the face over the last couple of months. /72   Pulse 79   Wt 190 lb 12.8 oz (86.5 kg)   SpO2 91%   BMI 32.75 kg/m²     Physical examination today, 2/1/2023 does not reveal evidence of palpable peripheral lymphadenopathy in the head and neck area, axilla supra or infraclavicular areas nor inguinal areas. The right total parotidectomy scar is barely visible in the preauricular area with excellent healing of the scar behind the right ear  Abdominal exam is without abdominal organomegaly or masses. Extremities no cyanosis clubbing or edema    CBC today 2/1/2023 reveals a WBC of 17.0 Hgb is 12.9 with an MCV of  93.7 and platelet count of 662,328. There is no evidence of new lymphadenopathy on physical examination, specifically in the head and neck area, axilla or inguinal areas. No evidence of palpable organomegaly in the abdomen. Continue conservative monitoring and follow-up regarding her history of Hodgkin's lymphoma. Please see #7 below. #2.  Right base of tongue nonspecific abnormality noted on PET scan at [facility] 1/3/2019, monitored by Dr. Dejuan Burgos, ENT at  [facility]  S/P right complete parotidectomy by Dr. 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 Marni Sales and reports that a PET scan on 1/3/2019 at [facility] identifying an abnormality at the base of the tongue-throat area. Dr. Marni Sales referred her to Dr. Dejuan Bugros - ENT at [facility] and she was seen in January of 2019 with a scope that did find a small abnormality in the right tongue region but this was too small to biopsy with a follow-up arrangement made for March 2019. She did not have a follow-up with Dr. Marni Sales.     CT neck with contrast on 6/8/2020 at [facility] documented:  No acute abnormality    Small cervical chain lymph nodes as well as small nodules in the parotid gland, favorable of recurrence given patient history of lymphoma. Recommend PET-CT. Otherwise, US-guided FNA or core biopsy of the parotid nodules could be considered. Examination today 1/5/2023) does not reveal evidence of palpable peripheral lymphadenopathy in the head and neck area. Inspection and digital palpation of the base of tongue with extraction of the tongue does not disclose nodules or masses in the base of tongue or in the head and neck area that I can appreciate on exam.    Fe Garcia was evaluated by Dr. Aelshia Hinton on 6/16/2020  for further ENT evaluation. PET scan on 6/18/2020 documented:  Mild asymmetrical uptake within the right posterior base of tongue along the left inguinal salivary gland with MAX SUV 4  Mild FDG uptake within the intraparotid lymph nodes max SUV of 2.6 and the right and left intraparotid nodes  Subcentimeter right posterior triangle lymph node has an SUV of 1.8  New 1 cm superior segment left lower lobe nodule of the subpleural posterior medial surface with increased FDG uptake and SUV of 5.5, worrisome for malignancy. Lymphoma versus primary lung neoplasm considered. Given the small size and the positioning along the lateral margin of the descending thoracic aorta, percutaneous biopsy would be extremely difficult  Stable borderline prominent size bilateral inguinal lymph nodes have mild FDG uptake with a maximum SUV of 2.5. Right external iliac node has a maximum SUV of 2.5     US-guided FNA of the 1.7 cm right parotid mass was performed on 7/9/2020 at [facility]. Pathology revealed:  Parotid, right, FNA:   Histiocytes and lymphocytes; negative for malignancy     Right complete parotidectomy was performed on 7/24/2020 by Dr. Aleshia Hinton at [facility].    Pathology revealed:  Four lymph nodes with reactive follicular hyperplasia  Findings consistent with a reactive process  Increased reactive follicles with germinal centers throughout the lymph node  The paracortical expansion with focal increase in histiocytes and Langerhans cells suggests mild dermatopathia. No evidence of the patient's previously diagnosed Hodgkin lymphoma or other hematolymphoid  process. Correlation with clinical and other laboratory findings is advised. Patient was seen by Dr Kathleen Velez on 8/25/2020 at [facility]  for post-op follow-up from superficial parotidectomy 7/24/20. She is doing well postoperatively and will not require any additional follow-ups. #3.   Subcentimeter pulmonary nodules and new 1 cm superior segment left lower lobe nodule of the subpleural posterior medial surface with increased FDG uptake and SUV of 5.5, worrisome for malignancy. Co-managed with Dr. Lilia Horan  developed chest pain and pressure and presented to the [facility] in [place] with a CTA chest on 1/10/2019 revealing 2 noncalcified nodules of the RLL-largest 6 mm. CT chest with contrast on 7/17/2019 documented:  6 mm RLL lung nodule, stable  4 mm RLL lung nodule, stable  Suspect fatty infiltration of the liver, but evaluation is limited due to phase of contrast  No lymphadenopathy      CT neck with contrast on 6/8/2020 at [facility] documented:  No acute abnormality    Small cervical chain lymph nodes as well as small nodules in the parotid gland, favorable of recurrence given patient history of lymphoma. Recommend PET-CT. Otherwise, US-guided FNA or core biopsy of the parotid nodules could be considered.      PET scan on 6/18/2020 documented:  Mild asymmetrical uptake within the right posterior base of tongue along the left inguinal salivary gland with MAX SUV 4  Mild FDG uptake within the intraparotid lymph nodes max SUV of 2.6 and the right and left intraparotid nodes  Subcentimeter right posterior triangle lymph node has an SUV of 1.8  New 1 cm superior segment left lower lobe nodule of the subpleural posterior medial surface with increased FDG uptake and SUV of 5.5, worrisome for malignancy. Lymphoma versus primary lung neoplasm considered. Given the small size and the positioning along the lateral margin of the descending thoracic aorta, percutaneous biopsy would be extremely difficult  Stable borderline prominent size bilateral inguinal lymph nodes have mild FDG uptake with a maximum SUV of 2.5. Right external iliac node has a maximum SUV of 2.5     I spoke to Dr. Juliet Love with thoracic surgery department at [facility] on 7/1/2020. He saw Yolie on 6/30/2020 with an in-depth and comprehensive consultation that is part of the record. Due to the fact that she has previously been radiated in the chest, the proximity of this lymph node or nodule to the aorta and the significant morbidity that may be involved with obtaining a surgical tissue diagnosis, recommendation is to have a follow-up CT scan in 6 months and hold off on intervention until there is more evidence of obvious growth or progression that unequivocally would require such an intervention for diagnosis. I believe this is reasonable. Additionally, if the needle biopsy of the parotid or if in ENT evaluation at [facility], she is found to have a lymphoproliferative disorder that would require systemic chemotherapy, then that should as well potentially address the abnormality noted in the chest.  I will follow this closely along with Dr. Ede De Leon as well. CXR 11/3/2020 documented:  No acute cardiopulmonary abnormality. 1 cm left lower lobe nodule seen on previous PET CT is not clearly demonstrated. Repeat CT would be much more sensitive    A follow-up CT scan of the chest will be performed for continued monitoring. I will see back in follow-up in 3 weeks. CT CHEST on 11/10/2020 at [facility])  compared to 6/18/2020 documented:  1 cm, stable, medial left lower lobe pulmonary nodule. No lymphadenopathy.   Diffuse low-density of the liver, favored to be due to fatty liver.      CT CHEST W CONTRAST on 5/18/2021 - at [facility], compared to 11/10/2020, documented:  There is no significant interval change.  Nonmasslike density in the posterior medial left lower lobe adjacent and lateral to the descending thoracic aorta is unchanged and again measures 9.6 mm as in the previous study.   4 mm and 5 mm adjacently located, well-defined, noncalcified nodules in the right lower lobe posteriorly are stable with no change.   No new noncalcified nodules are seen.      CT Chest  W Contrast 10/25/2021 :COMPARISON: 10/19/2021, 5/18/2021, 7/17/2019  6 mm and 5 mm adjacent right lower lobe pulmonary nodules, stable for over 2 years  10 x 7 mm nodular density with associated air bronchograms stable within the posterior medial superior segment left lower lobe abutting the medial mediastinum   Subcentimeter groundglass nodules in the superior segment left lower lobe are unchanged compared to 5/18/2021  No pathologic intrathoracic or axillary lymphadenopathy identified.Stable pulmonary nodules compared to the prior the 18th 2021   Largest posteromedial subpleural superior segment left lower lobe nodule measures 1 cm abutting the medial pleural surface, stable compared to 5/18/2021  No new or increasing size pulmonary nodules or pathologic intrathoracic lymphadenopathy    CT OF THE CHEST WITH CONTRAST at [facility] on 6/29/2022:COMPARISON 10/25/2021  4 mm sub- centimeter noncalcified pulmonary nodule in the right lower lobe, stable   6 mm sub- centimeter noncalcified pulmonary nodule in the right lower lobe, stable  7 mm customer granuloma in the left lower lobe, stable   calcified left hilar lymphadenopathy again noted, stable and unchanged.   No mediastinal, axillary or supraclavicular adenopathy identified    Imaging is stable to improved.  As previously noted in 1992 at the time of staging laparotomy, Addie had granulomatous changes in her spleen and on the surface of  her liver. This is more than likely what is going on in her chest but continued monitoring will need to continue due to the fact that she had Hodgkin's disease and received radiation therapy. Yearly CTs will be sufficient for monitoring of the pulmonary nodules at this juncture. I will see her back in follow-up in 6 months for physical examination. #4  GYN:  Pap Smear on 10/20/2021 was negative for malignancy      #5. Breast cancer tumor screening    She apparently had an abnormal mammogram of the right breast and is also under evaluation by Dr. Brigido España in [facility]. This is where she has her routine breast cancer screening and will continue according to Yolie and her . She had a repeat mammogram and MRI of the breasts at [facility] but results are pending. She will continue following with her physicians in [place]. #6  GI tumor screening    EGD and colonoscopy with biopsies were performed in [facility]  on 2022 by Dr. Sherrie Talavera and revealed the followin. Distal esophageal benign stricture. Dilated with 15 and 17 and 19 millimeters Savary dilator over the guidewire. 2. Previously noted ulcerative esophagitis has healed. No evidence of Ruiz's esophagus. 3. Sliding hiatal hernia. 4. Small amount of retained food in the stomach otherwise unremarkable upper endoscopy exam up to the 2nd portion of the duodenum. Biopsies obtained from the esophagus to rule out eosinophilic esophagitis. Final Pathologic Diagnosis:  ESOPHAGUS, ENDOSCOPIC BIOPSY:  Benign stratified squamous esophageal mucosa with mild reflux-associated changes.   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 Full serological workup was done including a CMP, LDH, B2M, serum protein electrophoresis and quantitative immunoglobulins, all which were normal.     A bone marrow biopsy and aspirate on 05/17/10 was performed and was virtually normal except for low iron stores. Cytogenetics were normal. Hematologically this is consistent with a post splenectomy leukocytosis and will subsequently be monitored conservatively. 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 Her PCP, Dr. Bebe Cardenas ran lab work documenting an elevated protein of 9.2 with polyclonal immunoglobulins and an elevated IgG of 4102 and elevated IgA of 702. The SPEP documented NO M - spike. Serology on 6/20/2022 revealed:  B2M-2.5  Kappa light chains- 166.20  Lambda light chains-118.34  K/L ratio-1.40  IgG-3951 ,IgA-783 , IgM-85  I-sgoan-Kjcswgbix albumin region. Slight restriction within the   polyclonal increase in the gamma region. JOSÉ MIGUEL gel shows a   normal pattern; no monoclonal proteins seen.   Albumin - 3.21  Alpha-1 globulin - 0.34  Alpha-2-globulin - 1.05  Beta globulin - 1.15  Gamma globulin - 3.86  CMP - Total protein of 9.9, Alk Phos 145, AST 44, glucose 204    CT OF THE NECK WITH CONTRAST at [facility] on 6/29/2022:  Scattered bilateral cervical lymph nodes are present however, none are pathologically enlarged  No acute abnormality     CT OF THE CHEST WITH CONTRAST at [facility] on 6/29/2022:  4 mm sub- centimeter noncalcified pulmonary nodule in the right lower lobe, stable   6 mm sub- centimeter noncalcified pulmonary nodule in the right lower lobe, stable  7 mm customer granuloma in the left lower lobe, stable   calcified left hilar lymphadenopathy again noted, stable and unchanged. No mediastinal, axillary or supraclavicular adenopathy identified    CT OF THE ABDOMEN/PELVIS WITH IV AND ORAL CONTRAST at [facility] on 6/29/2022:  hepatomegaly with diffuse fatty infiltration  There are two enhancing accessories spleens in the left upper quadrant which measured 2.3 cm and 2.0 cm respectively. No enlarged retroperitoneal lymphadenopathy  Shoddy pelvic lymphadenopathy is noted  Bilateral sub centimeter pelvic nodes are noted  9 mm bilateral inguinal lymphadenopathy  2.6 cm right ovarian cyst    Imaging studies do not reveal evidence of recurrent or new lymphoma nor secondary malignancies that can be ascertained with imaging studies. Serology from 6/20/2022 again confirmed polyclonal gammopathy without evidence of monoclonal gammopathy. She is of course asymptomatic from this standpoint except for the fact that she probably has symptomatic rheumatological disorders creating the hyperproteinemia in a polyclonal fashion. I suspect that if she ever takes a therapeutic dose of steroids, the proteins might level out but of course she does not desire aggressive steroid management and tolerates her osteoarthritis like symptoms fairly well. Second Opinion at [facility] and [facility] [place]) with Dr. Ines Seth on 8/4/2022:  Reviewed and interpreted today's ESR, CBC, and CMP along with outside medical records  Counseled patient and  in regard to [facility]. No evidence at this time suggest malignancy. This appears most consistent with inflammatory response.   Follow up labs: CRP, ESR, LDH, haptoglobin, and peripheral blood flow cytometry  Follow up MPN screening with CALR, MPL, JAK2 V617F, and JAK2 exon 12  Follow up [facility] for BCR/ABL. MRI brain with and without contrast next available to rule out possible MS  Bone marrow biopsy next available to rule out infiltrative marrow process  Return to clinic 3 weeks after bone marrow biopsy without labs    Serology on 8-4-2022 revealed:  BCR::ABL1 rearrangement- Negative  JAK2 Exon 12 - Not Detected  MPL Exon 10 Mutation -Not Detected  Calreticulin (CALR) Exon 9 Mutation- Not Detected  JAK2 c.1849G>T (p.V617F) Mutation-Not Detected  C-Reactive Protein-2.0 (0-0.9)  LDH-211  (120-246)  Sed Rate-104 (0-30)    Flow Cytometry Date Ordered: 8/5/2022   DIAGNOSIS: No significant abnormalities     COMMENT: There is no significant population of myeloblasts by CD33, CD34, CD45, and forward/side scatter. B cells are not increased (4.9% of total cells), with the following normal immunophenotype: positive for CD19 and CD45 (bright); and negative for CD34. T/NK cells are present (6.8% of total cells), with the following normal immunophenotype: positive for CD45 (bright); and negative for CD19 and CD34. The remaining cells consist of monocytes and granulocytes by CD33, CD45, and forward/side scatter. Bone Marrow Biopsy at [facility] on 8/16/2022:  Myeloblasts are not increased (0.1% of total cells), with the following normal immunophenotype: positive for CD33 (moderate), CD34, and CD45 (dim); and negative for CD19. B cells are present (7.5% of total cells), with the following normal immunophenotype: polytypic for surface and cytoplasmic kappa and lambda; positive for CD19, CD38 (heterogeneous moderate), and CD45; and negative for CD5 and CD34. T cells are not increased (12% of total cells), with the following normal immunophenotype: mixed CD4 and CD8 subsets (CD4>CD8, CD4:CD8 ratio = 2.3:1); positive for CD3, CD5, and CD45; and negative for CD19 and CD34.  Plasma cells are present (0.06% of total cells), with the following normal immunophenotype: polytypic for cytoplasmic kappa and lambda; positive for CD19, CD38 (bright), CD45 (moderate), and ; and negative for CD56. MR BRAIN WITHOUT AND WITH CONTRAST on 8/24/2022:COMPARISON:Brain MRI 05/03/2021  11 x 7 mm left parotid lymph node, stable  Normal pre- and postcontrast MRI of the brain      CBC today 2/1/2023 reveals a WBC of 17.0 Hgb is 12.9 with an MCV of  93.7 and platelet count of 379,766. I have reviewed all the above recent information in the second opinion by Dr. Benita Gutierrez. No new hematological developments or findings were uncovered. Conservative monitoring only recommended. 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 #8  Immunizations    There is no immunization history on file for this patient. Efrain Rm has not had an active COVID infection, nor has she had any vaccination. She is encouraged to get vaccinated however she is very fearful of the vaccine, to the point that she is willing to lose her job as a  at a healthcare facility which will probably happen this Friday because of her fear of the vaccination. This is unfortunate. #9  Facial lymphedema and hoarseness-previous history of XRT for Hodgkin's Oscar Krishnan comes today, 2/1/2023 with additional issues of hoarseness that she states that she has had over a year and a bit more lymphedema in the face over the last couple of months. I am unable to appreciate new adenopathy or changes in the neck. I suspect the lymphedema is a continued developing problem and secondary to XRT from years past for Hodgkin's lymphoma. Consultation is placed for ENT with Dr. Jessica Barnard to evaluate. Lymphedema consult at [facility] is requested to coordinate with ENT. Efrain Rm was seen today for follow-up.     Diagnoses and all orders for this visit:    History of non-Hodgkin's lymphoma  -     External Referral To Lymphedema Clinic  -     CT CHEST W CONTRAST; Future  -     CT ABDOMEN PELVIS W IV CONTRAST Additional Contrast? Oral; Future    Hoarseness  -     External Referral To ENT    Pulmonary nodules  -     CT CHEST W CONTRAST; Future  -     CT ABDOMEN PELVIS W IV CONTRAST Additional Contrast? Oral; Future    History of splenectomy  -     CT CHEST W CONTRAST; Future  -     CT ABDOMEN PELVIS W IV CONTRAST Additional Contrast? Oral; Future    Lymphadenopathy  -     CT CHEST W CONTRAST; Future  -     CT ABDOMEN PELVIS W IV CONTRAST Additional Contrast? Oral; Future        Orders Placed This Encounter   Procedures    CT CHEST W CONTRAST     Standing Status:   Future     Standing Expiration Date:   2/1/2024     Scheduling Instructions:      At [facility], compare to previous imaging     Order Specific Question:   STAT Creatinine as needed:     Answer:   No     Order Specific Question:   Reason for exam:     Answer:   lung nodules, lymphadenopathy, hx nonhogkins lymphoma, abnormal labs    CT ABDOMEN PELVIS W IV CONTRAST Additional Contrast? Oral     Standing Status:   Future     Standing Expiration Date:   2/1/2024     Scheduling Instructions:      At [facility], compare to prev imaging     Order Specific Question:   Additional Contrast?     Answer:   Oral     Order Specific Question:   STAT Creatinine as needed:     Answer:   No     Order Specific Question:   Reason for exam:     Answer:   lung nodules, lymphadenopathy, hx nonhogkins lymphoma, abnormal labs    External Referral To Lymphedema Clinic     Referral Priority:   Routine     Referral Type:   Eval and Treat     Referral Reason:   Specialty Services Required     Requested Specialty:   Specialist     Number of Visits Requested:   1    External Referral To ENT     Referral Priority:   Routine     Referral Type:   Eval and Treat     Referral Reason:   Specialty Services Required     Referred to Provider:   Alberto Mccloud MD     Requested Specialty:    Otolaryngology     Number of Visits Requested:   1             Return in about 6 months (around 8/1/2023) for Follow Up with Rochelle Allen.

## 2023-01-05 ENCOUNTER — TELEPHONE (OUTPATIENT)
Dept: HEMATOLOGY | Age: 53
End: 2023-01-05

## 2023-02-01 ENCOUNTER — HOSPITAL ENCOUNTER (OUTPATIENT)
Dept: INFUSION THERAPY | Age: 53
Discharge: HOME | End: 2023-02-01
Payer: COMMERCIAL

## 2023-02-01 ENCOUNTER — OFFICE VISIT (OUTPATIENT)
Dept: HEMATOLOGY | Age: 53
End: 2023-02-01
Payer: COMMERCIAL

## 2023-02-01 VITALS
BODY MASS INDEX: 32.75 KG/M2 | DIASTOLIC BLOOD PRESSURE: 72 MMHG | SYSTOLIC BLOOD PRESSURE: 132 MMHG | HEART RATE: 79 BPM | WEIGHT: 190.8 LBS | OXYGEN SATURATION: 91 %

## 2023-02-01 DIAGNOSIS — R91.8 PULMONARY NODULES: ICD-10-CM

## 2023-02-01 DIAGNOSIS — Z90.81 HISTORY OF SPLENECTOMY: ICD-10-CM

## 2023-02-01 DIAGNOSIS — Z85.72 HISTORY OF NON-HODGKIN'S LYMPHOMA: Primary | ICD-10-CM

## 2023-02-01 DIAGNOSIS — R49.0 HOARSENESS: ICD-10-CM

## 2023-02-01 DIAGNOSIS — R59.1 LYMPHADENOPATHY: ICD-10-CM

## 2023-02-01 DIAGNOSIS — Z85.72 HISTORY OF NON-HODGKIN'S LYMPHOMA: ICD-10-CM

## 2023-02-01 LAB
HCT VFR BLD CALC: 40.1 % (ref 34.1–44.9)
HEMOGLOBIN: 12.9 G/DL (ref 11.2–15.7)
LYMPHOCYTES ABSOLUTE: 4.4 K/UL (ref 1.18–3.74)
LYMPHOCYTES RELATIVE PERCENT: 26 % (ref 19.3–53.1)
MCH RBC QN AUTO: 30.1 PG (ref 25.6–32.2)
MCHC RBC AUTO-ENTMCNC: 32.2 G/DL (ref 32.3–35.5)
MCV RBC AUTO: 93.7 FL (ref 79.4–94.8)
MONOCYTES ABSOLUTE: 2 K/UL (ref 0.24–0.82)
MONOCYTES RELATIVE PERCENT: 11.5 % (ref 4.7–12.5)
NEUTROPHILS ABSOLUTE: 10.6 K/UL (ref 1.56–6.13)
NEUTROPHILS RELATIVE PERCENT: 62.5 % (ref 34–71.1)
PDW BLD-RTO: 15.7 % (ref 11.7–14.4)
PLATELET # BLD: 266 K/UL (ref 182–369)
PMV BLD AUTO: 10.5 FL (ref 7.4–10.4)
RBC # BLD: 4.28 M/UL (ref 3.93–5.22)
WBC # BLD: 17 K/UL (ref 3.98–10.04)

## 2023-02-01 PROCEDURE — 99213 OFFICE O/P EST LOW 20 MIN: CPT

## 2023-02-01 PROCEDURE — 85025 COMPLETE CBC W/AUTO DIFF WBC: CPT

## 2023-02-01 PROCEDURE — 99215 OFFICE O/P EST HI 40 MIN: CPT | Performed by: INTERNAL MEDICINE

## 2023-02-01 PROCEDURE — 36415 COLL VENOUS BLD VENIPUNCTURE: CPT

## 2023-02-02 ENCOUNTER — TRANSCRIBE ORDERS (OUTPATIENT)
Dept: PHYSICAL THERAPY | Facility: CLINIC | Age: 53
End: 2023-02-02
Payer: COMMERCIAL

## 2023-02-02 DIAGNOSIS — Z85.72 HISTORY OF NON-HODGKIN'S LYMPHOMA: Primary | ICD-10-CM

## 2023-02-06 ENCOUNTER — TRANSCRIBE ORDERS (OUTPATIENT)
Dept: ADMINISTRATIVE | Facility: HOSPITAL | Age: 53
End: 2023-02-06
Payer: COMMERCIAL

## 2023-02-06 DIAGNOSIS — Z85.72 HISTORY OF NON-HODGKIN'S LYMPHOMA: Primary | ICD-10-CM

## 2023-02-06 DIAGNOSIS — R91.8 PULMONARY NODULES: ICD-10-CM

## 2023-03-03 ENCOUNTER — TELEPHONE (OUTPATIENT)
Dept: HEMATOLOGY | Age: 53
End: 2023-03-03

## 2023-03-03 NOTE — TELEPHONE ENCOUNTER
[facility] ENT have tried to reach [facility] 3 times and are closing the referral.

## 2023-07-18 ENCOUNTER — HOSPITAL ENCOUNTER (OUTPATIENT)
Dept: CT IMAGING | Age: 53
Discharge: HOME | End: 2023-07-18
Payer: COMMERCIAL

## 2023-07-18 DIAGNOSIS — R91.8 PULMONARY NODULES: ICD-10-CM

## 2023-07-18 DIAGNOSIS — R59.1 LYMPHADENOPATHY: ICD-10-CM

## 2023-07-18 DIAGNOSIS — Z85.72 HISTORY OF NON-HODGKIN'S LYMPHOMA: ICD-10-CM

## 2023-07-18 DIAGNOSIS — Z90.81 HISTORY OF SPLENECTOMY: ICD-10-CM

## 2023-07-18 PROCEDURE — 6360000004 HC RX CONTRAST MEDICATION: Performed by: INTERNAL MEDICINE

## 2023-07-18 PROCEDURE — 74177 CT ABD & PELVIS W/CONTRAST: CPT

## 2023-07-18 PROCEDURE — 71260 CT THORAX DX C+: CPT

## 2023-07-18 RX ADMIN — IOPAMIDOL 75 ML: 755 INJECTION, SOLUTION INTRAVENOUS at 10:41

## 2023-07-26 ENCOUNTER — HOSPITAL ENCOUNTER (OUTPATIENT)
Dept: CT IMAGING | Facility: HOSPITAL | Age: 53
Discharge: HOME | End: 2023-07-26
Payer: COMMERCIAL

## 2023-08-01 NOTE — PROGRESS NOTES
stable and unchanged. No mediastinal, axillary or supraclavicular adenopathy identified    CT OF THE ABDOMEN/PELVIS WITH IV AND ORAL CONTRAST at [facility] on 6/29/2022:  hepatomegaly with diffuse fatty infiltration  There are two enhancing accessories spleens in the left upper quadrant which measured 2.3 cm and 2.0 cm respectively. No enlarged retroperitoneal lymphadenopathy  Shoddy pelvic lymphadenopathy is noted  Bilateral sub centimeter pelvic nodes are noted  9 mm bilateral inguinal lymphadenopathy  2.6 cm right ovarian cyst    Imaging studies do not reveal evidence of recurrent or new lymphoma nor secondary malignancies that can be ascertained with imaging studies. Serology from 6/20/2022 again confirmed polyclonal gammopathy without evidence of monoclonal gammopathy. She is of course asymptomatic from this standpoint except for the fact that she probably has symptomatic rheumatological disorders creating the hyperproteinemia in a polyclonal fashion. I suspect that if she ever takes a therapeutic dose of steroids, the proteins might level out but of course she does not desire aggressive steroid management and tolerates her osteoarthritis like symptoms fairly well. Second Opinion at [facility] and [facility] [place], [place]) with Dr. Janette Mcgee on 8/4/2022:  Reviewed and interpreted today's ESR, CBC, and CMP along with outside medical records  Counseled patient and  in regard to [facility]. No evidence at this time suggest malignancy. This appears most consistent with inflammatory response. Follow up labs: CRP, ESR, LDH, haptoglobin, and peripheral blood flow cytometry  Follow up MPN screening with CALR, MPL, JAK2 V617F, and JAK2 exon 12  Follow up [id number] [facility] for BCR/ABL.   MRI brain with and without contrast next available to rule out possible MS  Bone marrow biopsy next available to rule out infiltrative marrow process  Return to clinic 3 weeks after bone marrow biopsy without

## 2023-08-02 ENCOUNTER — TRANSCRIBE ORDERS (OUTPATIENT)
Dept: ADMINISTRATIVE | Facility: HOSPITAL | Age: 53
End: 2023-08-02
Payer: COMMERCIAL

## 2023-08-02 ENCOUNTER — HOSPITAL ENCOUNTER (OUTPATIENT)
Dept: INFUSION THERAPY | Age: 53
Discharge: HOME | End: 2023-08-02
Payer: COMMERCIAL

## 2023-08-02 ENCOUNTER — OFFICE VISIT (OUTPATIENT)
Dept: HEMATOLOGY | Age: 53
End: 2023-08-02
Payer: COMMERCIAL

## 2023-08-02 VITALS
OXYGEN SATURATION: 97 % | SYSTOLIC BLOOD PRESSURE: 136 MMHG | DIASTOLIC BLOOD PRESSURE: 74 MMHG | BODY MASS INDEX: 33.18 KG/M2 | WEIGHT: 193.3 LBS | HEART RATE: 92 BPM

## 2023-08-02 DIAGNOSIS — Z85.72 HISTORY OF NON-HODGKIN'S LYMPHOMA: Primary | ICD-10-CM

## 2023-08-02 DIAGNOSIS — R91.8 PULMONARY NODULES: ICD-10-CM

## 2023-08-02 DIAGNOSIS — Z85.72 HISTORY OF NON-HODGKIN'S LYMPHOMA: ICD-10-CM

## 2023-08-02 LAB
ERYTHROCYTE [DISTWIDTH] IN BLOOD BY AUTOMATED COUNT: 15.9 % (ref 11.7–14.4)
HCT VFR BLD AUTO: 37.5 % (ref 34.1–44.9)
HGB BLD-MCNC: 12.4 G/DL (ref 11.2–15.7)
LYMPHOCYTES # BLD: 5.59 K/UL (ref 1.18–3.74)
LYMPHOCYTES NFR BLD: 33.7 % (ref 19.3–53.1)
MCH RBC QN AUTO: 30 PG (ref 25.6–32.2)
MCHC RBC AUTO-ENTMCNC: 33.1 G/DL (ref 32.3–35.5)
MCV RBC AUTO: 90.6 FL (ref 79.4–94.8)
MONOCYTES # BLD: 2.64 K/UL (ref 0.24–0.82)
MONOCYTES NFR BLD: 15.9 % (ref 4.7–12.5)
NEUTROPHILS # BLD: 6.7 K/UL (ref 1.56–6.13)
NEUTS SEG NFR BLD: 40.4 % (ref 34–71.1)
PLATELET # BLD AUTO: 323 K/UL (ref 182–369)
PMV BLD AUTO: 11.4 FL (ref 7.4–10.4)
RBC # BLD AUTO: 4.14 M/UL (ref 3.93–5.22)
WBC # BLD AUTO: 16.59 K/UL (ref 3.98–10.04)

## 2023-08-02 PROCEDURE — 85025 COMPLETE CBC W/AUTO DIFF WBC: CPT

## 2023-08-02 PROCEDURE — 36415 COLL VENOUS BLD VENIPUNCTURE: CPT

## 2023-08-02 PROCEDURE — 99214 OFFICE O/P EST MOD 30 MIN: CPT | Performed by: INTERNAL MEDICINE

## 2023-08-02 PROCEDURE — 99212 OFFICE O/P EST SF 10 MIN: CPT

## 2023-08-02 RX ORDER — SULFAMETHOXAZOLE AND TRIMETHOPRIM 800; 160 MG/1; MG/1
1 TABLET ORAL 2 TIMES DAILY
COMMUNITY

## 2024-01-24 ENCOUNTER — HOSPITAL ENCOUNTER (OUTPATIENT)
Dept: CT IMAGING | Age: 54
Discharge: HOME | End: 2024-01-24
Payer: COMMERCIAL

## 2024-01-24 ENCOUNTER — HOSPITAL ENCOUNTER (OUTPATIENT)
Dept: CT IMAGING | Facility: HOSPITAL | Age: 54
Discharge: HOME | End: 2024-01-24
Payer: COMMERCIAL

## 2024-01-24 DIAGNOSIS — R91.8 PULMONARY NODULES: ICD-10-CM

## 2024-01-24 DIAGNOSIS — Z85.72 HISTORY OF NON-HODGKIN'S LYMPHOMA: ICD-10-CM

## 2024-01-24 PROCEDURE — 71260 CT THORAX DX C+: CPT

## 2024-01-24 PROCEDURE — 6360000004 HC RX CONTRAST MEDICATION: Performed by: INTERNAL MEDICINE

## 2024-01-24 RX ADMIN — IOPAMIDOL 60 ML: 755 INJECTION, SOLUTION INTRAVENOUS at 10:45

## 2024-01-29 ENCOUNTER — TELEPHONE (OUTPATIENT)
Dept: HEMATOLOGY | Age: 54
End: 2024-01-29

## 2024-01-29 NOTE — TELEPHONE ENCOUNTER
Called patient and reminded patient of their appointment on 02/01/2024and patient confirmed they would be here.

## 2024-01-31 NOTE — PROGRESS NOTES
FDG uptake and SUV of 5.5, worrisome for malignancy. Lymphoma versus primary lung neoplasm considered. Given the small size and the positioning along the lateral margin of the descending thoracic aorta, percutaneous biopsy would be extremely difficult  Stable borderline prominent size bilateral inguinal lymph nodes have mild FDG uptake with a maximum SUV of 2.5.   Right external iliac node has a maximum SUV of 2.5     I spoke to Dr. Ezequiel Gr with thoracic surgery department at [facility] on 7/1/2020.   He saw Addie on 6/30/2020 with an in-depth and comprehensive consultation that is part of the record.  Due to the fact that she has previously been radiated in the chest, the proximity of this lymph node or nodule to the aorta and the significant morbidity that may be involved with obtaining a surgical tissue diagnosis, recommendation is to have a follow-up CT scan in 6 months and hold off on intervention until there is more evidence of obvious growth or progression that unequivocally would require such an intervention for diagnosis.  I believe this is reasonable.  Additionally, if the needle biopsy of the parotid or if in ENT evaluation at [place], she is found to have a lymphoproliferative disorder that would require systemic chemotherapy, then that should as well potentially address the abnormality noted in the chest.  I will follow this closely along with Dr. Edison Gr as well.    CXR 11/3/2020 documented:  No acute cardiopulmonary abnormality.   1 cm left lower lobe nodule seen on previous PET CT is not clearly demonstrated. Repeat CT would be much more sensitive    A follow-up CT scan of the chest will be performed for continued monitoring.  I will see back in follow-up in 3 weeks.    CT CHEST on 11/10/2020 at [facility]  compared to 6/18/2020 documented:  1 cm, stable, medial left lower lobe pulmonary nodule.  No lymphadenopathy.  Diffuse low-density of the liver, favored to be due to fatty

## 2024-02-01 ENCOUNTER — HOSPITAL ENCOUNTER (OUTPATIENT)
Dept: INFUSION THERAPY | Age: 54
Discharge: HOME | End: 2024-02-01
Payer: COMMERCIAL

## 2024-02-01 ENCOUNTER — OFFICE VISIT (OUTPATIENT)
Dept: HEMATOLOGY | Age: 54
End: 2024-02-01
Payer: COMMERCIAL

## 2024-02-01 VITALS
SYSTOLIC BLOOD PRESSURE: 132 MMHG | OXYGEN SATURATION: 97 % | TEMPERATURE: 97.9 F | WEIGHT: 194 LBS | HEIGHT: 64 IN | BODY MASS INDEX: 33.12 KG/M2 | DIASTOLIC BLOOD PRESSURE: 76 MMHG | HEART RATE: 87 BPM

## 2024-02-01 DIAGNOSIS — Z85.72 HISTORY OF NON-HODGKIN'S LYMPHOMA: Primary | ICD-10-CM

## 2024-02-01 DIAGNOSIS — R59.1 LYMPHADENOPATHY: ICD-10-CM

## 2024-02-01 DIAGNOSIS — Z90.81 HISTORY OF SPLENECTOMY: ICD-10-CM

## 2024-02-01 DIAGNOSIS — D72.829 LEUKOCYTOSIS, UNSPECIFIED TYPE: ICD-10-CM

## 2024-02-01 DIAGNOSIS — R91.8 PULMONARY NODULES: ICD-10-CM

## 2024-02-01 DIAGNOSIS — Z00.00 HEALTHCARE MAINTENANCE: ICD-10-CM

## 2024-02-01 DIAGNOSIS — Z85.72 HISTORY OF NON-HODGKIN'S LYMPHOMA: ICD-10-CM

## 2024-02-01 LAB
ALBUMIN SERPL-MCNC: 4.1 G/DL (ref 3.5–5.2)
ALP SERPL-CCNC: 118 U/L (ref 35–104)
ALT SERPL-CCNC: 35 U/L (ref 9–52)
ANION GAP SERPL CALCULATED.3IONS-SCNC: 8 MMOL/L (ref 7–19)
AST SERPL-CCNC: 43 U/L (ref 14–36)
BASOPHILS # BLD: 0.1 K/UL (ref 0.01–0.08)
BASOPHILS NFR BLD: 0.8 % (ref 0.1–1.2)
BILIRUB SERPL-MCNC: 0.6 MG/DL (ref 0.2–1.3)
BUN SERPL-MCNC: 16 MG/DL (ref 7–17)
CALCIUM SERPL-MCNC: 8.7 MG/DL (ref 8.4–10.2)
CHLORIDE SERPL-SCNC: 105 MMOL/L (ref 98–111)
CO2 SERPL-SCNC: 25 MMOL/L (ref 22–29)
CREAT SERPL-MCNC: 1.2 MG/DL (ref 0.5–1)
EOSINOPHIL # BLD: 0.65 K/UL (ref 0.04–0.54)
EOSINOPHIL NFR BLD: 5.4 % (ref 0.7–7)
ERYTHROCYTE [DISTWIDTH] IN BLOOD BY AUTOMATED COUNT: 15 % (ref 11.7–14.4)
GLOBULIN: 6.8 G/DL
GLUCOSE SERPL-MCNC: 261 MG/DL (ref 74–106)
HCT VFR BLD AUTO: 36 % (ref 34.1–44.9)
HGB BLD-MCNC: 11.6 G/DL (ref 11.2–15.7)
LDH SERPL-CCNC: 198 U/L (ref 120–246)
LYMPHOCYTES # BLD: 3.15 K/UL (ref 1.18–3.74)
LYMPHOCYTES NFR BLD: 26.1 % (ref 19.3–53.1)
MCH RBC QN AUTO: 29.7 PG (ref 25.6–32.2)
MCHC RBC AUTO-ENTMCNC: 32.2 G/DL (ref 32.3–35.5)
MCV RBC AUTO: 92.1 FL (ref 79.4–94.8)
MONOCYTES # BLD: 1.25 K/UL (ref 0.24–0.82)
MONOCYTES NFR BLD: 10.4 % (ref 4.7–12.5)
NEUTROPHILS # BLD: 6.87 K/UL (ref 1.56–6.13)
NEUTS SEG NFR BLD: 57 % (ref 34–71.1)
PLATELET # BLD AUTO: 459 K/UL (ref 182–369)
PMV BLD AUTO: 11.1 FL (ref 7.4–10.4)
POTASSIUM SERPL-SCNC: 4.3 MMOL/L (ref 3.5–5.1)
PROT SERPL-MCNC: 10.8 G/DL (ref 6.3–8.2)
RBC # BLD AUTO: 3.91 M/UL (ref 3.93–5.22)
SODIUM SERPL-SCNC: 138 MMOL/L (ref 137–145)
WBC # BLD AUTO: 12.06 K/UL (ref 3.98–10.04)

## 2024-02-01 PROCEDURE — 85025 COMPLETE CBC W/AUTO DIFF WBC: CPT

## 2024-02-01 PROCEDURE — 99214 OFFICE O/P EST MOD 30 MIN: CPT | Performed by: INTERNAL MEDICINE

## 2024-02-01 PROCEDURE — 80053 COMPREHEN METABOLIC PANEL: CPT

## 2024-02-01 PROCEDURE — 83615 LACTATE (LD) (LDH) ENZYME: CPT

## 2024-02-01 PROCEDURE — 99212 OFFICE O/P EST SF 10 MIN: CPT

## 2024-02-01 PROCEDURE — 36415 COLL VENOUS BLD VENIPUNCTURE: CPT

## 2024-02-01 RX ORDER — LEVOTHYROXINE SODIUM 88 MCG
88 TABLET ORAL EVERY MORNING
COMMUNITY
Start: 2024-01-26

## 2024-02-03 LAB — B2 MICROGLOB SERPL-MCNC: 3.4 MG/L (ref 0.8–2.4)

## 2024-02-04 LAB
ALBUMIN SERPL-MCNC: 3.24 G/DL (ref 3.75–5.01)
ALPHA1 GLOB SERPL ELPH-MCNC: 0.24 G/DL (ref 0.19–0.46)
ALPHA2 GLOB SERPL ELPH-MCNC: 0.78 G/DL (ref 0.48–1.05)
B-GLOBULIN SERPL ELPH-MCNC: 1.27 G/DL (ref 0.48–1.1)
DEPRECATED KAPPA LC FREE/LAMBDA SER: 1.69 {RATIO} (ref 0.26–1.65)
EER MONOCLONAL PROTEIN AND FLC, SERUM: ABNORMAL
GAMMA GLOB SERPL ELPH-MCNC: 4.38 G/DL (ref 0.62–1.51)
IGA SERPL-MCNC: 860 MG/DL (ref 68–408)
IGG SERPL-MCNC: 4322 MG/DL (ref 768–1632)
IGM SERPL-MCNC: 91 MG/DL (ref 35–263)
INTERPRETATION SERPL IFE-IMP: ABNORMAL
INTERPRETATION SERPL IFE-IMP: ABNORMAL
KAPPA LC FREE SER-MCNC: 256.87 MG/L (ref 3.3–19.4)
LAMBDA LC FREE SERPL-MCNC: 152.03 MG/L (ref 5.71–26.3)
MONOCLONAL PROTEIN, SERUM: ABNORMAL G/DL
PROT SERPL-MCNC: 9.9 G/DL (ref 6.3–8.2)

## 2024-08-16 NOTE — PROGRESS NOTES
from years past for Hodgkin's lymphoma.    Consultation is placed for ENT with Dr. Alberto Mccloud to evaluate.   Lymphedema consult at [facility] is requested to coordinate with ENT.      Addie was seen today for follow-up and cancer.    Diagnoses and all orders for this visit:    History of non-Hodgkin's lymphoma  -     CBC with Auto Differential; Standing  -     Comprehensive Metabolic Panel; Standing  -     Lactate Dehydrogenase; Standing  -     MONOCLONAL PROTEIN AND FLC, SERUM; Standing  -     Beta 2 Microglobulin, Serum; Standing  -     CT CHEST W CONTRAST; Future  -     CT SOFT TISSUE NECK W CONTRAST; Future    Leukocytosis, unspecified type  -     CBC with Auto Differential; Standing  -     Comprehensive Metabolic Panel; Standing  -     Lactate Dehydrogenase; Standing  -     MONOCLONAL PROTEIN AND FLC, SERUM; Standing  -     Beta 2 Microglobulin, Serum; Standing    Pulmonary nodules  -     CT CHEST W CONTRAST; Future    Neck pain  -     CT SOFT TISSUE NECK W CONTRAST; Future                Orders Placed This Encounter   Procedures    CT CHEST W CONTRAST     Standing Status:   Future     Standing Expiration Date:   8/19/2025     Order Specific Question:   STAT Creatinine as needed:     Answer:   No     Order Specific Question:   Reason for exam:     Answer:   Monitor pulmonary nodules. Compare to prev 1/24/2024 at [facility]    CT SOFT TISSUE NECK W CONTRAST     Standing Status:   Future     Standing Expiration Date:   8/19/2025     Order Specific Question:   STAT Creatinine as needed:     Answer:   No     Order Specific Question:   Reason for exam:     Answer:   neck pain, History of lymphoma.     Order Specific Question:   Reason for exam:     Answer:   look at BONE WINDOWS    CBC with Auto Differential     Standing Status:   Standing     Number of Occurrences:   2     Standing Expiration Date:   8/16/2025    Comprehensive Metabolic Panel     Standing Status:   Standing     Number of Occurrences:   2     Standing

## 2024-08-19 ENCOUNTER — HOSPITAL ENCOUNTER (OUTPATIENT)
Dept: INFUSION THERAPY | Age: 54
Discharge: HOME | End: 2024-08-19
Payer: COMMERCIAL

## 2024-08-19 ENCOUNTER — OFFICE VISIT (OUTPATIENT)
Dept: HEMATOLOGY | Age: 54
End: 2024-08-19
Payer: COMMERCIAL

## 2024-08-19 VITALS
OXYGEN SATURATION: 97 % | BODY MASS INDEX: 34.47 KG/M2 | HEART RATE: 94 BPM | TEMPERATURE: 98.1 F | HEIGHT: 64 IN | WEIGHT: 201.9 LBS | DIASTOLIC BLOOD PRESSURE: 64 MMHG | SYSTOLIC BLOOD PRESSURE: 122 MMHG

## 2024-08-19 DIAGNOSIS — D72.829 LEUKOCYTOSIS, UNSPECIFIED TYPE: ICD-10-CM

## 2024-08-19 DIAGNOSIS — Z90.81 HISTORY OF SPLENECTOMY: ICD-10-CM

## 2024-08-19 DIAGNOSIS — Z85.72 HISTORY OF NON-HODGKIN'S LYMPHOMA: ICD-10-CM

## 2024-08-19 DIAGNOSIS — M54.2 NECK PAIN: ICD-10-CM

## 2024-08-19 DIAGNOSIS — R91.8 PULMONARY NODULES: ICD-10-CM

## 2024-08-19 DIAGNOSIS — Z85.72 HISTORY OF NON-HODGKIN'S LYMPHOMA: Primary | ICD-10-CM

## 2024-08-19 LAB
ALBUMIN SERPL-MCNC: 3.6 G/DL (ref 3.5–5.2)
ALP SERPL-CCNC: 111 U/L (ref 35–104)
ALT SERPL-CCNC: 26 U/L (ref 5–33)
ANION GAP SERPL CALCULATED.3IONS-SCNC: 10 MMOL/L (ref 7–19)
AST SERPL-CCNC: 31 U/L (ref 5–32)
BASOPHILS # BLD: 0.07 K/UL (ref 0.01–0.08)
BASOPHILS NFR BLD: 0.6 % (ref 0.1–1.2)
BILIRUB SERPL-MCNC: 0.3 MG/DL (ref 0–1.2)
BUN SERPL-MCNC: 11 MG/DL (ref 6–20)
CALCIUM SERPL-MCNC: 8.4 MG/DL (ref 8.6–10)
CHLORIDE SERPL-SCNC: 100 MMOL/L (ref 98–107)
CO2 SERPL-SCNC: 24 MMOL/L (ref 22–29)
CREAT SERPL-MCNC: 1.2 MG/DL (ref 0.5–0.9)
EOSINOPHIL # BLD: 0.58 K/UL (ref 0.04–0.54)
EOSINOPHIL NFR BLD: 4.7 % (ref 0.7–7)
ERYTHROCYTE [DISTWIDTH] IN BLOOD BY AUTOMATED COUNT: 15.3 % (ref 11.7–14.4)
GLUCOSE SERPL-MCNC: 320 MG/DL (ref 70–99)
HCT VFR BLD AUTO: 36.3 % (ref 34.1–44.9)
HGB BLD-MCNC: 11.8 G/DL (ref 11.2–15.7)
LDH SERPL-CCNC: 155 U/L (ref 135–214)
LYMPHOCYTES # BLD: 3.04 K/UL (ref 1.18–3.74)
LYMPHOCYTES NFR BLD: 24.4 % (ref 19.3–53.1)
MCH RBC QN AUTO: 29.1 PG (ref 25.6–32.2)
MCHC RBC AUTO-ENTMCNC: 32.5 G/DL (ref 32.3–35.5)
MCV RBC AUTO: 89.6 FL (ref 79.4–94.8)
MONOCYTES # BLD: 1.3 K/UL (ref 0.24–0.82)
MONOCYTES NFR BLD: 10.4 % (ref 4.7–12.5)
NEUTROPHILS # BLD: 7.41 K/UL (ref 1.56–6.13)
NEUTS SEG NFR BLD: 59.5 % (ref 34–71.1)
PLATELET # BLD AUTO: 504 K/UL (ref 182–369)
PMV BLD AUTO: 11.1 FL (ref 7.4–10.4)
POTASSIUM SERPL-SCNC: 4.4 MMOL/L (ref 3.5–5.1)
PROT SERPL-MCNC: 9.3 G/DL (ref 6.4–8.3)
RBC # BLD AUTO: 4.05 M/UL (ref 3.93–5.22)
SODIUM SERPL-SCNC: 134 MMOL/L (ref 136–145)
WBC # BLD AUTO: 12.45 K/UL (ref 3.98–10.04)

## 2024-08-19 PROCEDURE — 80053 COMPREHEN METABOLIC PANEL: CPT

## 2024-08-19 PROCEDURE — 85025 COMPLETE CBC W/AUTO DIFF WBC: CPT

## 2024-08-19 PROCEDURE — 83615 LACTATE (LD) (LDH) ENZYME: CPT

## 2024-08-19 PROCEDURE — 99214 OFFICE O/P EST MOD 30 MIN: CPT | Performed by: INTERNAL MEDICINE

## 2024-08-19 PROCEDURE — 99213 OFFICE O/P EST LOW 20 MIN: CPT

## 2024-08-19 PROCEDURE — 36415 COLL VENOUS BLD VENIPUNCTURE: CPT

## 2024-08-19 RX ORDER — LINACLOTIDE 290 UG/1
290 CAPSULE, GELATIN COATED ORAL
COMMUNITY
Start: 2023-11-01

## 2024-08-19 RX ORDER — PANTOPRAZOLE SODIUM 40 MG/1
40 TABLET, DELAYED RELEASE ORAL DAILY
COMMUNITY
Start: 2024-06-03

## 2024-08-19 RX ORDER — FLUTICASONE FUROATE AND VILANTEROL TRIFENATATE 100; 25 UG/1; UG/1
1 POWDER RESPIRATORY (INHALATION) DAILY
COMMUNITY

## 2024-08-19 NOTE — PROGRESS NOTES
Please excuse Addie Alarcon ( 1970) today 2024. She had an apt in our office.     Thank you,         ___________________________  Rolando Drake MD

## 2024-08-20 LAB — B2 MICROGLOB SERPL-MCNC: 2.8 MG/L (ref 0.8–2.4)

## 2024-08-21 ENCOUNTER — TELEPHONE (OUTPATIENT)
Dept: HEMATOLOGY | Age: 54
End: 2024-08-21

## 2024-08-21 NOTE — TELEPHONE ENCOUNTER
Called to tell patient of date and time of the CT scans of the neck and the chest. She isn't able to do the day we scheduled, so she said she would reach out to them to get it rescheduled.

## 2024-09-09 ENCOUNTER — TELEPHONE (OUTPATIENT)
Dept: HEMATOLOGY | Age: 54
End: 2024-09-09

## 2025-02-12 ENCOUNTER — HOSPITAL ENCOUNTER (OUTPATIENT)
Dept: INFUSION THERAPY | Age: 55
Discharge: HOME | End: 2025-02-12
Payer: COMMERCIAL

## 2025-02-12 DIAGNOSIS — D72.829 LEUKOCYTOSIS, UNSPECIFIED TYPE: ICD-10-CM

## 2025-02-12 DIAGNOSIS — Z85.72 HISTORY OF NON-HODGKIN'S LYMPHOMA: ICD-10-CM

## 2025-02-12 LAB
ALBUMIN SERPL-MCNC: 3.7 G/DL (ref 3.5–5.2)
ALP SERPL-CCNC: 113 U/L (ref 35–104)
ALT SERPL-CCNC: 47 U/L (ref 5–33)
ANION GAP SERPL CALCULATED.3IONS-SCNC: 10 MMOL/L (ref 7–19)
AST SERPL-CCNC: 46 U/L (ref 5–32)
BASOPHILS # BLD: 0.08 K/UL (ref 0–0.2)
BASOPHILS NFR BLD: 0.6 % (ref 0–1)
BILIRUB SERPL-MCNC: 0.3 MG/DL (ref 0–1.2)
BUN SERPL-MCNC: 17 MG/DL (ref 6–20)
CALCIUM SERPL-MCNC: 9.1 MG/DL (ref 8.6–10)
CHLORIDE SERPL-SCNC: 102 MMOL/L (ref 98–107)
CO2 SERPL-SCNC: 25 MMOL/L (ref 22–29)
CREAT SERPL-MCNC: 1.3 MG/DL (ref 0.5–0.9)
EOSINOPHIL # BLD: 0.69 K/UL (ref 0–0.6)
EOSINOPHIL NFR BLD: 5.1 % (ref 0–5)
ERYTHROCYTE [DISTWIDTH] IN BLOOD BY AUTOMATED COUNT: 15.2 % (ref 11.5–14.5)
GLUCOSE SERPL-MCNC: 351 MG/DL (ref 70–99)
HCT VFR BLD AUTO: 36.1 % (ref 37–47)
HGB BLD-MCNC: 11.5 G/DL (ref 12–16)
LDH SERPL-CCNC: 187 U/L (ref 135–214)
LYMPHOCYTES # BLD: 3.82 K/UL (ref 1.1–4.5)
LYMPHOCYTES NFR BLD: 28.2 % (ref 20–40)
MCH RBC QN AUTO: 29 PG (ref 27–31)
MCHC RBC AUTO-ENTMCNC: 31.9 G/DL (ref 33–37)
MCV RBC AUTO: 90.9 FL (ref 81–99)
MONOCYTES # BLD: 1.63 K/UL (ref 0–0.9)
MONOCYTES NFR BLD: 12 % (ref 1–10)
NEUTROPHILS # BLD: 7.25 K/UL (ref 1.5–7.5)
NEUTS SEG NFR BLD: 53.7 % (ref 50–65)
PLATELET # BLD AUTO: 462 K/UL (ref 130–400)
PMV BLD AUTO: 11.6 FL (ref 9.4–12.3)
POTASSIUM SERPL-SCNC: 4.9 MMOL/L (ref 3.5–5.1)
PROT SERPL-MCNC: 9.4 G/DL (ref 6.4–8.3)
RBC # BLD AUTO: 3.97 M/UL (ref 4.2–5.4)
SODIUM SERPL-SCNC: 137 MMOL/L (ref 136–145)
WBC # BLD AUTO: 13.53 K/UL (ref 4.8–10.8)

## 2025-02-12 PROCEDURE — 80053 COMPREHEN METABOLIC PANEL: CPT

## 2025-02-12 PROCEDURE — 86334 IMMUNOFIX E-PHORESIS SERUM: CPT

## 2025-02-12 PROCEDURE — 83615 LACTATE (LD) (LDH) ENZYME: CPT

## 2025-02-12 PROCEDURE — 83883 ASSAY NEPHELOMETRY NOT SPEC: CPT

## 2025-02-12 PROCEDURE — 85025 COMPLETE CBC W/AUTO DIFF WBC: CPT

## 2025-02-12 PROCEDURE — 83521 IG LIGHT CHAINS FREE EACH: CPT

## 2025-02-12 PROCEDURE — 84165 PROTEIN E-PHORESIS SERUM: CPT

## 2025-02-12 PROCEDURE — 84155 ASSAY OF PROTEIN SERUM: CPT

## 2025-02-12 PROCEDURE — 82232 ASSAY OF BETA-2 PROTEIN: CPT

## 2025-02-12 PROCEDURE — 36415 COLL VENOUS BLD VENIPUNCTURE: CPT

## 2025-02-12 PROCEDURE — 82784 ASSAY IGA/IGD/IGG/IGM EACH: CPT

## 2025-02-14 ENCOUNTER — TELEPHONE (OUTPATIENT)
Dept: HEMATOLOGY | Age: 55
End: 2025-02-14

## 2025-02-14 LAB — B2 MICROGLOB SERPL-MCNC: 3.2 MG/L (ref 0.8–2.4)

## 2025-02-14 NOTE — TELEPHONE ENCOUNTER
Called Patient and reminded patient of their appointment on 02/19/2025 and patient confirmed they would be here. Reminded patient to just come at appointment time, and to not come at the lab appointment time. Reminded patient that we will not check them in any more than 30 minutes before appointment time.  We have now moved to the [facility] [place] that is located between our old office and the ER at the [facility]. Letting the Pt know that our front entrance faces the  [place]. Reminded pt to eat well and be well hydrated for their labs.

## 2025-02-16 LAB
ALBUMIN SERPL-MCNC: 3.7 G/DL (ref 3.75–5.01)
ALPHA1 GLOB SERPL ELPH-MCNC: 0.29 G/DL (ref 0.19–0.46)
ALPHA2 GLOB SERPL ELPH-MCNC: 0.77 G/DL (ref 0.48–1.05)
B-GLOBULIN SERPL ELPH-MCNC: 1.26 G/DL (ref 0.48–1.1)
DEPRECATED KAPPA LC FREE/LAMBDA SER: 1.9 {RATIO} (ref 0.26–1.65)
EER MONOCLONAL PROTEIN AND FLC, SERUM: ABNORMAL
GAMMA GLOB SERPL ELPH-MCNC: 3.18 G/DL (ref 0.62–1.51)
IGA SERPL-MCNC: 697 MG/DL (ref 68–408)
IGG SERPL-MCNC: 3241 MG/DL (ref 768–1632)
IGM SERPL-MCNC: 97 MG/DL (ref 35–263)
INTERPRETATION SERPL IFE-IMP: ABNORMAL
INTERPRETATION SERPL IFE-IMP: ABNORMAL
KAPPA LC FREE SER-MCNC: 205.86 MG/L (ref 3.3–19.4)
LAMBDA LC FREE SERPL-MCNC: 108.28 MG/L (ref 5.71–26.3)
MONOCLONAL PROTEIN, SERUM: ABNORMAL G/DL
PROT SERPL-MCNC: 9.2 G/DL (ref 6.3–8.2)

## 2025-02-18 ENCOUNTER — TELEPHONE (OUTPATIENT)
Dept: HEMATOLOGY | Age: 55
End: 2025-02-18

## 2025-02-18 NOTE — TELEPHONE ENCOUNTER
Tried to call joi to advise of appointment change with Dr. Drake due to inclement weather moved from 02/19/2025 to 03/05/2025.

## 2025-02-18 NOTE — PROGRESS NOTES
CD38 (bright), CD45 (moderate), and ; and negative for CD56.     MR BRAIN WITHOUT AND WITH CONTRAST on 8/24/2022:COMPARISON:Brain MRI 05/03/2021  11 x 7 mm left parotid lymph node, stable  Normal pre- and postcontrast MRI of the brain      CBC today 2/1/2024 reveals a WBC of 12.06. Hgb is 11.6 with an MCV of 92.1 and platelet count of 459,000.    I have reviewed all the above recent information in the second opinion by Dr. Eugene Marroquin.  No new hematological developments or findings were uncovered.  Conservative monitoring only recommended.  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  Addie presented  in her seventh month of her first pregnancy in the early part of 1992 with a palpable  right supraclavicular lymph node.   After delivery on 05/11/92 she sought attention for this.    A right supraclavicular lymph node biopsy on 06/11/92   Pathology revealed a nodular sclerosing Hodgkin’s disease.     Disease involved that area as well as the mediastinum.     A bone marrow biopsy and aspirate did not show any evidence of metastatic disease.     A staging laparotomy on 7/6/1992 that included lymph nodes biopsies and a splenectomy did not reveal any infradiaphragmatic malignant pathology.    There were miliary granulomatous lesions noted throughout the spleen and on the surface of the liver, but no evidence of Hodgkin’s.     She was referred to radiation therapy and was cared for by Dr. Ryan Easley in [place], [place].   He delivered approximately 3,960 cGy with a boost to the right neck. Her radiation therapy was in the mantle and

## 2025-02-28 ENCOUNTER — TELEPHONE (OUTPATIENT)
Dept: HEMATOLOGY | Age: 55
End: 2025-02-28

## 2025-02-28 NOTE — TELEPHONE ENCOUNTER
Spouses mailbox is full as well. Patient's voicemailbox is full. Unable to leave appointment information for the patient. Called the child's phone    Her daughter Laura will give her the appointment information for Dr. Drake on 03/05/25.

## 2025-03-13 ENCOUNTER — TELEPHONE (OUTPATIENT)
Dept: HEMATOLOGY | Age: 55
End: 2025-03-13

## 2025-03-13 NOTE — TELEPHONE ENCOUNTER

## 2025-03-19 ENCOUNTER — HOSPITAL ENCOUNTER (OUTPATIENT)
Dept: INFUSION THERAPY | Age: 55
Discharge: HOME | End: 2025-03-19
Payer: COMMERCIAL

## 2025-03-19 ENCOUNTER — OFFICE VISIT (OUTPATIENT)
Dept: HEMATOLOGY | Age: 55
End: 2025-03-19

## 2025-03-19 VITALS
OXYGEN SATURATION: 98 % | TEMPERATURE: 97.6 F | DIASTOLIC BLOOD PRESSURE: 76 MMHG | HEIGHT: 64 IN | BODY MASS INDEX: 33.87 KG/M2 | HEART RATE: 76 BPM | WEIGHT: 198.4 LBS | SYSTOLIC BLOOD PRESSURE: 118 MMHG

## 2025-03-19 DIAGNOSIS — M54.2 NECK PAIN: ICD-10-CM

## 2025-03-19 DIAGNOSIS — Z85.72 HISTORY OF NON-HODGKIN'S LYMPHOMA: Primary | ICD-10-CM

## 2025-03-19 DIAGNOSIS — R59.1 LYMPHADENOPATHY: ICD-10-CM

## 2025-03-19 DIAGNOSIS — R91.8 PULMONARY NODULES: ICD-10-CM

## 2025-03-19 DIAGNOSIS — D72.829 LEUKOCYTOSIS, UNSPECIFIED TYPE: ICD-10-CM

## 2025-03-19 PROCEDURE — 99213 OFFICE O/P EST LOW 20 MIN: CPT

## 2025-05-20 ENCOUNTER — TELEPHONE (OUTPATIENT)
Dept: HEMATOLOGY | Age: 55
End: 2025-05-20

## 2025-05-20 NOTE — TELEPHONE ENCOUNTER
Scheduled CT soft tissue neck/CT chest at [facility] ([facility]) for 9/15/2025 at 9:30 am arrive for 10:00 am appointment.     NPO 4 hours prior    Addie is aware of appointment    Order faxed to [phone]  Ph: [phone]

## (undated) DEVICE — AMBU AURAONCE U SIZE 3: Brand: AURAONCE

## (undated) DEVICE — SUTURE ETHLN SZ 4-0 L18IN NONABSORBABLE BLK L19MM PS-2 3/8 1667H

## (undated) DEVICE — DISCONTINUED USE 416956 GLOVE 8.5 LTX ST TRIFLEX POWDER LK CF

## (undated) DEVICE — CURITY NON-ADHERENT STRIPS: Brand: CURITY

## (undated) DEVICE — MAJOR BSIN SETUP PK

## (undated) DEVICE — GLOVE SURG SZ 65 CRM LTX FREE POLYISOPRENE POLYMER BEAD ANTI

## (undated) DEVICE — VELCLOSE LATEX FREE VELCRO ELASTIC BANDAGE 4INX5YD: Brand: VELCLOSE

## (undated) DEVICE — GLOVE SURG SZ 75 CRM LTX FREE POLYISOPRENE POLYMER BEAD ANTI

## (undated) DEVICE — AIRWAY CIRCUIT: Brand: DEROYAL

## (undated) DEVICE — GLOVE SURG SZ 7 CRM LTX FREE POLYISOPRENE POLYMER BEAD ANTI

## (undated) DEVICE — AIRLIFE™ NASAL OXYGEN CANNULA CURVED, NONFLARED TIP, WITH 7' FEET (2.1 M) CRUSH RESISTANT TUBING, OVER-THE-EAR STYLE: Brand: AIRLIFE™

## (undated) DEVICE — BANDAGE ESMARK 4IN ST

## (undated) DEVICE — MASK LG ADLT ANES MEDICHOICE

## (undated) DEVICE — PADDING CAST W4INXL4YD ST COT RAYON MICROPLEATED HIGHLY

## (undated) DEVICE — U-DRAPE: Brand: CONVERTORS

## (undated) DEVICE — CHLORAPREP 26ML ORANGE